# Patient Record
Sex: FEMALE | Race: WHITE | NOT HISPANIC OR LATINO | Employment: OTHER | URBAN - METROPOLITAN AREA
[De-identification: names, ages, dates, MRNs, and addresses within clinical notes are randomized per-mention and may not be internally consistent; named-entity substitution may affect disease eponyms.]

---

## 2017-03-14 ENCOUNTER — LAB (OUTPATIENT)
Dept: INTERNAL MEDICINE | Facility: CLINIC | Age: 67
End: 2017-03-14

## 2017-03-14 DIAGNOSIS — R73.01 IFG (IMPAIRED FASTING GLUCOSE): ICD-10-CM

## 2017-03-14 DIAGNOSIS — I10 ESSENTIAL HYPERTENSION: ICD-10-CM

## 2017-03-14 DIAGNOSIS — E03.8 SUBCLINICAL HYPOTHYROIDISM: ICD-10-CM

## 2017-03-14 DIAGNOSIS — E78.5 HYPERLIPIDEMIA, UNSPECIFIED HYPERLIPIDEMIA TYPE: ICD-10-CM

## 2017-03-14 LAB
ALBUMIN SERPL-MCNC: 4.1 G/DL (ref 3.5–5.2)
ALBUMIN/GLOB SERPL: 1.6 G/DL
ALP SERPL-CCNC: 85 U/L (ref 40–129)
ALT SERPL-CCNC: 17 U/L (ref 5–33)
AST SERPL-CCNC: 18 U/L (ref 5–32)
BILIRUB SERPL-MCNC: 0.8 MG/DL (ref 0.2–1.2)
BUN SERPL-MCNC: 24 MG/DL (ref 8–23)
BUN/CREAT SERPL: 26.4 (ref 7–25)
CALCIUM SERPL-MCNC: 9.2 MG/DL (ref 8.8–10.5)
CHLORIDE SERPL-SCNC: 101 MMOL/L (ref 98–107)
CHOLEST SERPL-MCNC: 140 MG/DL (ref 0–200)
CO2 SERPL-SCNC: 27.2 MMOL/L (ref 22–29)
CREAT SERPL-MCNC: 0.91 MG/DL (ref 0.57–1)
GLOBULIN SER CALC-MCNC: 2.5 GM/DL
GLUCOSE SERPL-MCNC: 129 MG/DL (ref 65–99)
HBA1C MFR BLD: 6.2 % (ref 4.8–5.6)
HDLC SERPL-MCNC: 51 MG/DL (ref 40–60)
LDLC SERPL CALC-MCNC: 64 MG/DL (ref 0–100)
POTASSIUM SERPL-SCNC: 3.9 MMOL/L (ref 3.5–5.2)
PROT SERPL-MCNC: 6.6 G/DL (ref 6–8.5)
SODIUM SERPL-SCNC: 139 MMOL/L (ref 136–145)
T4 SERPL-MCNC: 6.01 MCG/DL (ref 4.5–11.7)
TRIGL SERPL-MCNC: 123 MG/DL (ref 0–150)
TSH SERPL DL<=0.005 MIU/L-ACNC: 4.35 MIU/ML (ref 0.27–4.2)
VLDLC SERPL CALC-MCNC: 24.6 MG/DL (ref 7–27)

## 2017-03-20 ENCOUNTER — OFFICE VISIT (OUTPATIENT)
Dept: INTERNAL MEDICINE | Facility: CLINIC | Age: 67
End: 2017-03-20

## 2017-03-20 VITALS
DIASTOLIC BLOOD PRESSURE: 80 MMHG | HEIGHT: 62 IN | OXYGEN SATURATION: 98 % | WEIGHT: 175 LBS | SYSTOLIC BLOOD PRESSURE: 136 MMHG | BODY MASS INDEX: 32.2 KG/M2 | HEART RATE: 85 BPM

## 2017-03-20 DIAGNOSIS — E78.5 HYPERLIPIDEMIA, UNSPECIFIED HYPERLIPIDEMIA TYPE: ICD-10-CM

## 2017-03-20 DIAGNOSIS — Z11.59 NEED FOR HEPATITIS C SCREENING TEST: ICD-10-CM

## 2017-03-20 DIAGNOSIS — I10 ESSENTIAL HYPERTENSION: Primary | ICD-10-CM

## 2017-03-20 DIAGNOSIS — R73.01 IFG (IMPAIRED FASTING GLUCOSE): ICD-10-CM

## 2017-03-20 LAB
BILIRUB BLD-MCNC: NEGATIVE MG/DL
CLARITY, POC: CLEAR
COLOR UR: YELLOW
GLUCOSE UR STRIP-MCNC: NEGATIVE MG/DL
KETONES UR QL: NEGATIVE
LEUKOCYTE EST, POC: NEGATIVE
NITRITE UR-MCNC: NEGATIVE MG/ML
PH UR: 7 [PH] (ref 5–8)
PROT UR STRIP-MCNC: NEGATIVE MG/DL
RBC # UR STRIP: NEGATIVE /UL
SP GR UR: 1.02 (ref 1–1.03)
UROBILINOGEN UR QL: NORMAL

## 2017-03-20 PROCEDURE — 99214 OFFICE O/P EST MOD 30 MIN: CPT | Performed by: NURSE PRACTITIONER

## 2017-03-20 PROCEDURE — 81003 URINALYSIS AUTO W/O SCOPE: CPT | Performed by: NURSE PRACTITIONER

## 2017-03-20 NOTE — PROGRESS NOTES
Chief Complaint   Patient presents with   • Hyperlipidemia   • Hypertension       Subjective     Debbie Cheng is a 66 y.o. female being seen for a follow up appointment today regarding  HTN,  Hyperlipidemia, prediabetes, and hypothyroidism. She is keeping her carb count < 50 grams a meal. Weight is up 3 pounds. She is going to circuit training 4-5 days a week. She denies any symptoms of hyper or hypoglycemia. She had a UTI at [facility], and was treated.       History of Present Illness     Allergies   Allergen Reactions   • Penicillins Anaphylaxis and Swelling   • Erythromycin Other (See Comments)     JOINT PAIN AND SWELLING         Current Outpatient Prescriptions:   •  aspirin 81 MG chewable tablet, Chew 81 mg daily., Disp: , Rfl:   •  cetirizine (ZyrTEC) 10 MG tablet, Take 10 mg by mouth daily., Disp: , Rfl:   •  fluticasone (FLONASE) 50 MCG/ACT nasal spray, 2 sprays into each nostril daily. Administer 2 sprays in each nostril for each dose., Disp: , Rfl:   •  metoprolol tartrate (LOPRESSOR) 25 MG tablet, Take 1 tablet by mouth 2 (Two) Times a Day., Disp: 180 tablet, Rfl: 2  •  Misc Natural Products (OSTEO BI-FLEX ADV TRIPLE ST PO), Take  by mouth., Disp: , Rfl:   •  moxifloxacin (VIGAMOX) 0.5 % ophthalmic solution, Administer 1 drop to both eyes 3 (Three) Times a Day., Disp: 3 mL, Rfl: 0  •  Multiple Vitamins-Minerals (MULTIVITAMIN PO), Take 1 tablet by mouth., Disp: , Rfl:   •  simvastatin (ZOCOR) 40 MG tablet, Take 1 tablet by mouth Every Night., Disp: 90 tablet, Rfl: 3  •  Tavaborole (KERYDIN) 5 % solution, Apply  topically., Disp: , Rfl:   •  valsartan-hydrochlorothiazide (DIOVAN-HCT) 320-12.5 MG per tablet, Take 1 tablet by mouth daily., Disp: 90 tablet, Rfl: 3  •  vitamin D (ERGOCALCIFEROL) 09888 UNITS capsule capsule, Take 1 capsule by mouth every 7 days., Disp: 4 capsule, Rfl: 11    The following portions of the patient's history were reviewed and updated as appropriate: allergies, current medications,  past family history, past medical history, past social history, past surgical history and problem list.    Review of Systems   Constitutional: Negative.    HENT: Negative.    Eyes: Negative.    Respiratory: Negative.    Cardiovascular: Negative.  Negative for chest pain and leg swelling.   Gastrointestinal: Negative.    Endocrine: Negative.    Genitourinary: Negative.  Negative for difficulty urinating, dyspareunia and dysuria.   Musculoskeletal: Negative.    Allergic/Immunologic: Positive for environmental allergies.   Neurological: Negative.  Negative for dizziness and facial asymmetry.   Hematological: Negative.  Does not bruise/bleed easily.   Psychiatric/Behavioral: Negative.        Assessment     Physical Exam   Constitutional: She is oriented to person, place, and time. She appears well-developed and well-nourished. No distress.   HENT:   Head: Normocephalic.   Right Ear: External ear normal.   Left Ear: External ear normal.   Nose: Nose normal.   Mouth/Throat: Oropharynx is clear and moist. No oropharyngeal exudate.   Eyes: Pupils are equal, round, and reactive to light.   Neck: Neck supple. No thyromegaly present.   Cardiovascular: Normal rate, regular rhythm and normal heart sounds.    No murmur heard.  Pulmonary/Chest: Effort normal. No respiratory distress. She has no wheezes.   Musculoskeletal: She exhibits no edema or deformity.   Neurological: She is alert and oriented to person, place, and time. No cranial nerve deficit.   Skin: Skin is warm and dry. No erythema.   Psychiatric: She has a normal mood and affect. Her behavior is normal.   Vitals reviewed.      Plan     Her fasting labs were reviewed with the patient from last week.     Debbie was seen today for hyperlipidemia and hypertension.    Diagnoses and all orders for this visit:    Essential hypertension  -     Comprehensive metabolic panel; Future  -     Lipid panel; Future  -     Hemoglobin A1c; Future  -     CBC & Differential; Future  -      T4 & TSH ([facility]); Future    Hyperlipidemia, unspecified hyperlipidemia type  -     Comprehensive metabolic panel; Future  -     Lipid panel; Future  -     Hemoglobin A1c; Future  -     CBC & Differential; Future  -     T4 & TSH ([facility]); Future    IFG (impaired fasting glucose)  -     Comprehensive metabolic panel; Future  -     Lipid panel; Future  -     Hemoglobin A1c; Future  -     CBC & Differential; Future  -     T4 & TSH ([facility]); Future    Need for hepatitis C screening test  -     Hepatitis C Antibody; Future

## 2017-03-25 ENCOUNTER — RESULTS ENCOUNTER (OUTPATIENT)
Dept: INTERNAL MEDICINE | Facility: CLINIC | Age: 67
End: 2017-03-25

## 2017-03-25 DIAGNOSIS — Z11.59 NEED FOR HEPATITIS C SCREENING TEST: ICD-10-CM

## 2017-04-28 RX ORDER — ERGOCALCIFEROL 1.25 MG/1
50000 CAPSULE ORAL
Qty: 4 CAPSULE | Refills: 11 | Status: SHIPPED | OUTPATIENT
Start: 2017-04-28 | End: 2017-05-22 | Stop reason: SDUPTHER

## 2017-05-22 RX ORDER — ERGOCALCIFEROL 1.25 MG/1
50000 CAPSULE ORAL
Qty: 12 CAPSULE | Refills: 3 | Status: SHIPPED | OUTPATIENT
Start: 2017-05-22 | End: 2018-04-23 | Stop reason: SDUPTHER

## 2017-05-26 NOTE — ADDENDUM NOTE
Addended by: ISAEL JIN on: 3/20/2017 03:29 PM     Modules accepted: Orders     Returned call in rg to post-op problems, and to schedule a f/u appt with Dr. Hancock per Dr. Hancock, no ans unable to leave msg due to voicemail not setup yet.

## 2017-06-13 ENCOUNTER — OFFICE VISIT (OUTPATIENT)
Dept: CARDIOLOGY | Facility: CLINIC | Age: 67
End: 2017-06-13

## 2017-06-13 VITALS
WEIGHT: 169.5 LBS | BODY MASS INDEX: 31.19 KG/M2 | DIASTOLIC BLOOD PRESSURE: 70 MMHG | HEART RATE: 60 BPM | SYSTOLIC BLOOD PRESSURE: 118 MMHG | HEIGHT: 62 IN

## 2017-06-13 DIAGNOSIS — I48.0 PAF (PAROXYSMAL ATRIAL FIBRILLATION) (HCC): ICD-10-CM

## 2017-06-13 DIAGNOSIS — I10 ESSENTIAL HYPERTENSION: Primary | ICD-10-CM

## 2017-06-13 PROCEDURE — 93000 ELECTROCARDIOGRAM COMPLETE: CPT | Performed by: INTERNAL MEDICINE

## 2017-06-13 PROCEDURE — 99213 OFFICE O/P EST LOW 20 MIN: CPT | Performed by: INTERNAL MEDICINE

## 2017-06-13 NOTE — PROGRESS NOTES
Date of Office Visit: 2017  Encounter Provider: Keon Mann MD  Place of Service: [facility] CARDIOLOGY  Patient Name: Debbie Cheng  :1950    Chief Complaint   Patient presents with   • Atrial Fibrillation     1 YR FOLLOW UP    :     HPI: Debbie Cheng is a 66 y.o. female who presents today to followup. She is an extremely pleasant lady who had one episode of highly symptomatic atrial fibrillation in May 2015. She converted on her own. She was started on rivaroxaban and metoprolol. In 2015, I stopped the NOAC when it was clear that she had experienced no recurrence. She has some rivaroxaban in her medicine cabinet and knows to take it if her afib recurs, and then to call me. She remains active, and is limited only by joint pain. She denies any lightheadedness or orthostasis.  She has been exercising regularly and is losing weight.       Past Medical History:   Diagnosis Date   • Acute bacterial conjunctivitis of left eye 10/20/2016   • Allergic rhinitis    • Asthma    • Bronchitis    • Hyperlipidemia    • Hypertension    • PAF (paroxysmal atrial fibrillation)     one episode, 2015; was briefly treated with Xarelto   • Pneumonia    • Rotator cuff tendonitis    • Type 2 diabetes mellitus    • UTI (urinary tract infection)    • Vitamin D deficiency        Past Surgical History:   Procedure Laterality Date   •  SECTION     • CHOLECYSTECTOMY     • HYSTERECTOMY     • KNEE SURGERY         Social History     Social History   • Marital status:      Spouse name: N/A   • Number of children: N/A   • Years of education: N/A     Occupational History   • Not on file.     Social History Main Topics   • Smoking status: Never Smoker   • Smokeless tobacco: Not on file   • Alcohol use No   • Drug use: No   • Sexual activity: Not on file     Other Topics Concern   • Not on file     Social History Narrative       Family History   Problem Relation Age of Onset   •  "Stroke Mother    • Hypertension Mother    • Heart disease Father    • Hypertension Sister    • Heart disease Brother    • Cancer Paternal Grandmother      breast   • Breast cancer Paternal Grandmother    • Heart disease Paternal Grandfather    • Diabetes Paternal Grandfather        Review of Systems   Musculoskeletal: Positive for joint pain.   All other systems reviewed and are negative.      Allergies   Allergen Reactions   • Penicillins Anaphylaxis and Swelling   • Erythromycin Other (See Comments)     JOINT PAIN AND SWELLING         Current Outpatient Prescriptions:   •  aspirin 81 MG chewable tablet, Chew 81 mg daily., Disp: , Rfl:   •  cetirizine (ZyrTEC) 10 MG tablet, Take 10 mg by mouth daily., Disp: , Rfl:   •  fluticasone (FLONASE) 50 MCG/ACT nasal spray, 2 sprays into each nostril daily. Administer 2 sprays in each nostril for each dose., Disp: , Rfl:   •  metoprolol tartrate (LOPRESSOR) 25 MG tablet, Take 1 tablet by mouth 2 (Two) Times a Day., Disp: 180 tablet, Rfl: 2  •  Misc Natural Products (OSTEO BI-FLEX ADV TRIPLE ST PO), Take  by mouth., Disp: , Rfl:   •  Multiple Vitamins-Minerals (MULTIVITAMIN PO), Take 1 tablet by mouth., Disp: , Rfl:   •  simvastatin (ZOCOR) 40 MG tablet, Take 1 tablet by mouth Every Night., Disp: 90 tablet, Rfl: 3  •  Tavaborole (KERYDIN) 5 % solution, Apply  topically., Disp: , Rfl:   •  valsartan-hydrochlorothiazide (DIOVAN-HCT) 320-12.5 MG per tablet, Take 1 tablet by mouth daily., Disp: 90 tablet, Rfl: 3  •  vitamin D (ERGOCALCIFEROL) 23835 UNITS capsule capsule, Take 1 capsule by mouth Every 7 (Seven) Days., Disp: 12 capsule, Rfl: 3     Objective:     Vitals:    06/13/17 1007   BP: 118/70   Pulse: 60   Weight: 169 lb 8 oz (76.9 kg)   Height: 62\" (157.5 cm)     Body mass index is 31 kg/(m^2).    Physical Exam   Constitutional: She is oriented to person, place, and time. She appears well-developed and well-nourished.   HENT:   Head: Normocephalic.   Nose: Nose normal. "   Mouth/Throat: Oropharynx is clear and moist.   Eyes: Conjunctivae and EOM are normal. Pupils are equal, round, and reactive to light.   Neck: Normal range of motion. No JVD present.   Cardiovascular: Normal rate, regular rhythm, normal heart sounds and intact distal pulses.    No murmur heard.  Pulmonary/Chest: Effort normal and breath sounds normal.   Abdominal: Soft. She exhibits no mass. There is no tenderness.   Musculoskeletal: Normal range of motion. She exhibits no edema.   Lymphadenopathy:     She has no cervical adenopathy.   Neurological: She is alert and oriented to person, place, and time. No cranial nerve deficit.   Skin: Skin is warm and dry. No rash noted.   Psychiatric: She has a normal mood and affect. Her behavior is normal. Judgment and thought content normal.   Vitals reviewed.        ECG 12 Lead  Date/Time: 6/13/2017 10:35 AM  Performed by: KEON MANN  Authorized by: KEON MANN   Comparison: compared with previous ECG   Similar to previous ECG  Rhythm: sinus rhythm  Conduction: conduction normal  ST Segments: ST segments normal  T Waves: T waves normal  QRS axis: normal  Other: no other findings  Clinical impression: normal ECG              Assessment:       Diagnosis Plan   1. Essential hypertension     2. PAF (paroxysmal atrial fibrillation)            Plan:       1.  Her BP is very well controlled.  As she continues with weight loss, I may have to decrease her valsartan dose.  I asked her to call me if she gets symptoms of orthostasis.    2.  She had one highly symptomatic AF episode and has had no evidence of recurrence.  She will remain on metoprolol.  She has some rivaroxaban in her cabinet and will take it if her symptoms recur, then call me immediately.    Sincerely,       Keon Mann MD

## 2017-06-20 ENCOUNTER — TRANSCRIBE ORDERS (OUTPATIENT)
Dept: CARDIOLOGY | Facility: CLINIC | Age: 67
End: 2017-06-20

## 2017-06-20 DIAGNOSIS — Z13.9 SCREENING: Primary | ICD-10-CM

## 2017-07-05 ENCOUNTER — APPOINTMENT (OUTPATIENT)
Dept: CARDIOLOGY | Facility: HOSPITAL | Age: 67
End: 2017-07-05
Attending: INTERNAL MEDICINE

## 2017-07-06 ENCOUNTER — LAB (OUTPATIENT)
Dept: INTERNAL MEDICINE | Facility: CLINIC | Age: 67
End: 2017-07-06

## 2017-07-06 DIAGNOSIS — I10 ESSENTIAL HYPERTENSION: ICD-10-CM

## 2017-07-06 DIAGNOSIS — E78.5 HYPERLIPIDEMIA, UNSPECIFIED HYPERLIPIDEMIA TYPE: ICD-10-CM

## 2017-07-06 DIAGNOSIS — R73.01 IFG (IMPAIRED FASTING GLUCOSE): ICD-10-CM

## 2017-07-06 LAB
ALBUMIN SERPL-MCNC: 3.9 G/DL (ref 3.5–5.2)
ALBUMIN/GLOB SERPL: 1.7 G/DL
ALP SERPL-CCNC: 82 U/L (ref 40–129)
ALT SERPL-CCNC: 21 U/L (ref 5–33)
AST SERPL-CCNC: 23 U/L (ref 5–32)
BASOPHILS # BLD MANUAL: 0 10*3/MM3 (ref 0–0.2)
BASOPHILS NFR BLD MANUAL: 0 % (ref 0–2)
BILIRUB SERPL-MCNC: 0.5 MG/DL (ref 0.2–1.2)
BUN SERPL-MCNC: 18 MG/DL (ref 8–23)
BUN/CREAT SERPL: 19.8 (ref 7–25)
CALCIUM SERPL-MCNC: 9 MG/DL (ref 8.8–10.5)
CHLORIDE SERPL-SCNC: 102 MMOL/L (ref 98–107)
CHOLEST SERPL-MCNC: 154 MG/DL (ref 0–200)
CO2 SERPL-SCNC: 24.9 MMOL/L (ref 22–29)
CREAT SERPL-MCNC: 0.91 MG/DL (ref 0.57–1)
DIFFERENTIAL COMMENT: ABNORMAL
EOSINOPHIL # BLD MANUAL: 0.11 10*3/MM3 (ref 0.1–0.3)
EOSINOPHIL NFR BLD MANUAL: 2 % (ref 0–4)
ERYTHROCYTE [DISTWIDTH] IN BLOOD BY AUTOMATED COUNT: 14 % (ref 11.5–14.5)
GLOBULIN SER CALC-MCNC: 2.3 GM/DL
GLUCOSE SERPL-MCNC: 106 MG/DL (ref 65–99)
HBA1C MFR BLD: 5.8 % (ref 4.8–5.6)
HCT VFR BLD AUTO: 36.7 % (ref 37–47)
HDLC SERPL-MCNC: 51 MG/DL (ref 40–60)
HGB BLD-MCNC: 12.4 G/DL (ref 12–16)
LDLC SERPL CALC-MCNC: 67 MG/DL (ref 0–100)
LYMPHOCYTES # BLD MANUAL: 1.06 10*3/MM3 (ref 0.6–4.8)
LYMPHOCYTES NFR BLD MANUAL: 19 % (ref 20–45)
MCH RBC QN AUTO: 30 PG (ref 27–31)
MCHC RBC AUTO-ENTMCNC: 33.8 G/DL (ref 31–37)
MCV RBC AUTO: 88.9 FL (ref 81–99)
MONOCYTES # BLD MANUAL: 0.5 10*3/MM3 (ref 0–1)
MONOCYTES NFR BLD MANUAL: 9 % (ref 3–8)
NEUTROPHILS # BLD MANUAL: 3.24 10*3/MM3 (ref 1.5–8.3)
NEUTROPHILS NFR BLD MANUAL: 57 % (ref 45–70)
PLATELET # BLD AUTO: 231 10*3/MM3 (ref 140–500)
PLATELET BLD QL SMEAR: ABNORMAL
POTASSIUM SERPL-SCNC: 4.4 MMOL/L (ref 3.5–5.2)
PROT SERPL-MCNC: 6.2 G/DL (ref 6–8.5)
RBC # BLD AUTO: 4.13 10*6/MM3 (ref 4.2–5.4)
RBC MORPH BLD: ABNORMAL
SODIUM SERPL-SCNC: 141 MMOL/L (ref 136–145)
T4 SERPL-MCNC: 5.57 MCG/DL (ref 4.5–11.7)
TRIGL SERPL-MCNC: 182 MG/DL (ref 0–150)
TSH SERPL DL<=0.005 MIU/L-ACNC: 6.66 MIU/ML (ref 0.27–4.2)
VLDLC SERPL CALC-MCNC: 36.4 MG/DL (ref 7–27)
WBC # BLD AUTO: 5.59 10*3/MM3 (ref 4.8–10.8)

## 2017-07-10 ENCOUNTER — HOSPITAL ENCOUNTER (OUTPATIENT)
Dept: CARDIOLOGY | Facility: HOSPITAL | Age: 67
Discharge: HOME | End: 2017-07-10
Attending: INTERNAL MEDICINE | Admitting: INTERNAL MEDICINE

## 2017-07-10 VITALS
BODY MASS INDEX: 30.91 KG/M2 | HEIGHT: 62 IN | HEART RATE: 60 BPM | SYSTOLIC BLOOD PRESSURE: 116 MMHG | DIASTOLIC BLOOD PRESSURE: 60 MMHG | WEIGHT: 168 LBS

## 2017-07-10 DIAGNOSIS — Z13.9 SCREENING: ICD-10-CM

## 2017-07-10 LAB
BH CV ECHO MEAS - DIST AO DIAM: 1.57 CM
BH CV VAS BP LEFT ARM: NORMAL MMHG
BH CV VAS BP RIGHT ARM: NORMAL MMHG
BH CV XLRA MEAS - MID AO DIAM: 1.65 CM
BH CV XLRA MEAS - PAD LEFT ABI DP: 1.15
BH CV XLRA MEAS - PAD LEFT ABI PT: 1.2
BH CV XLRA MEAS - PAD LEFT ARM: 109 MMHG
BH CV XLRA MEAS - PAD LEFT LEG DP: 134 MMHG
BH CV XLRA MEAS - PAD LEFT LEG PT: 140 MMHG
BH CV XLRA MEAS - PAD RIGHT ABI DP: 1.12
BH CV XLRA MEAS - PAD RIGHT ABI PT: 1.2
BH CV XLRA MEAS - PAD RIGHT ARM: 116 MMHG
BH CV XLRA MEAS - PAD RIGHT LEG DP: 130 MMHG
BH CV XLRA MEAS - PAD RIGHT LEG PT: 140 MMHG
BH CV XLRA MEAS - PROX AO DIAM: 1.73 CM
BH CV XLRA MEAS LEFT ICA/CCA RATIO: 1.35
BH CV XLRA MEAS LEFT MID CCA PSV: NORMAL CM/SEC
BH CV XLRA MEAS LEFT MID ICA PSV: NORMAL CM/SEC
BH CV XLRA MEAS LEFT PROX ECA PSV: NORMAL CM/SEC
BH CV XLRA MEAS RIGHT ICA/CCA RATIO: 1
BH CV XLRA MEAS RIGHT MID CCA PSV: NORMAL CM/SEC
BH CV XLRA MEAS RIGHT MID ICA PSV: NORMAL CM/SEC
BH CV XLRA MEAS RIGHT PROX ECA PSV: NORMAL CM/SEC

## 2017-07-10 PROCEDURE — 93799 UNLISTED CV SVC/PROCEDURE: CPT

## 2017-07-13 ENCOUNTER — OFFICE VISIT (OUTPATIENT)
Dept: INTERNAL MEDICINE | Facility: CLINIC | Age: 67
End: 2017-07-13

## 2017-07-13 VITALS
HEIGHT: 62 IN | WEIGHT: 169 LBS | DIASTOLIC BLOOD PRESSURE: 70 MMHG | HEART RATE: 66 BPM | SYSTOLIC BLOOD PRESSURE: 122 MMHG | OXYGEN SATURATION: 97 % | BODY MASS INDEX: 31.1 KG/M2

## 2017-07-13 DIAGNOSIS — Z00.00 MEDICARE ANNUAL WELLNESS VISIT, SUBSEQUENT: Primary | ICD-10-CM

## 2017-07-13 DIAGNOSIS — I10 ESSENTIAL HYPERTENSION: ICD-10-CM

## 2017-07-13 DIAGNOSIS — I48.0 PAF (PAROXYSMAL ATRIAL FIBRILLATION) (HCC): ICD-10-CM

## 2017-07-13 DIAGNOSIS — R73.03 PRE-DIABETES: ICD-10-CM

## 2017-07-13 DIAGNOSIS — E78.5 HYPERLIPIDEMIA, UNSPECIFIED HYPERLIPIDEMIA TYPE: ICD-10-CM

## 2017-07-13 DIAGNOSIS — E55.9 VITAMIN D DEFICIENCY: ICD-10-CM

## 2017-07-13 DIAGNOSIS — Z11.59 NEED FOR HEPATITIS C SCREENING TEST: ICD-10-CM

## 2017-07-13 PROCEDURE — G0439 PPPS, SUBSEQ VISIT: HCPCS | Performed by: NURSE PRACTITIONER

## 2017-07-13 PROCEDURE — 99214 OFFICE O/P EST MOD 30 MIN: CPT | Performed by: NURSE PRACTITIONER

## 2017-07-13 NOTE — PROGRESS NOTES
QUICK REFERENCE INFORMATION:  The ABCs of the Annual Wellness Visit    She is also going to follow up on chronic diseases of HTN, Hyperlipidemia, prediabetes, and Paroxysmal A fib. She sees Dr. Mann for cardio (last appt 6-). She denies CP, SOA, edema. Or heart palpitations. She is on a low carb diet with 10,000 steps a day. Weight is down 8 pounds.     Subsequent Medicare Wellness Visit    HEALTH RISK ASSESSMENT    1950    Recent Hospitalizations:  No hospitalization(s) within the last year..        Current Medical Providers:  Patient Care Team:  LOUIS Coles as PCP - General (Family Medicine)  LOUIS Coles as PCP - Claims Attributed  Trace Onofre MD as Consulting Physician (Ophthalmology)  Keon Mann MD as Consulting Physician (Cardiology)  Parrish Oliver MD as Consulting Physician (Orthopedic Surgery)        Smoking Status:  History   Smoking Status   • Never Smoker   Smokeless Tobacco   • Not on file       Alcohol Consumption:  History   Alcohol Use No       Depression Screen:   PHQ-9 Depression Screening 7/13/2017   Little interest or pleasure in doing things 0   Feeling down, depressed, or hopeless 0   Trouble falling or staying asleep, or sleeping too much -   Feeling tired or having little energy -   Poor appetite or overeating -   Feeling bad about yourself - or that you are a failure or have let yourself or your family down -   Trouble concentrating on things, such as reading the newspaper or watching television -   Moving or speaking so slowly that other people could have noticed. Or the opposite - being so fidgety or restless that you have been moving around a lot more than usual -   Thoughts that you would be better off dead, or of hurting yourself in some way -   PHQ-9 Total Score 0       Health Habits and Functional and Cognitive Screening:  Functional & Cognitive Status 7/13/2017   Do you have difficulty preparing food and eating? No   Do you have difficulty  bathing yourself? No   Do you have difficulty getting dressed? No   Do you have difficulty using the toilet? No   Do you have difficulty moving around from place to place? No   In the past year have you fallen or experienced a near fall? No   Do you need help using the phone?  No   Are you deaf or do you have serious difficulty hearing?  No   Do you need help with transportation? No   Do you need help shopping? No   Do you need help preparing meals?  No   Do you need help with housework?  No   Do you need help with laundry? No   Do you need help taking your medications? No   Do you need help managing money? No   Do you have difficulty concentrating, remembering or making decisions? No       Health Habits  Current Diet: Low Carb Diet  Dental Exam: Up to date  Eye Exam: Up to date  Exercise (times per week): 7 times per week  Current Exercise Activities Include: Walking      Does the patient have evidence of cognitive impairment? No    Aspirin use counseling: Taking ASA appropriately as indicated      Recent Lab Results:  CMP:  Lab Results   Component Value Date     (H) 07/06/2017    BUN 18 07/06/2017    CREATININE 0.91 07/06/2017    EGFRIFNONA 62 07/06/2017    EGFRIFAFRI 75 07/06/2017    BCR 19.8 07/06/2017     07/06/2017    K 4.4 07/06/2017    CO2 24.9 07/06/2017    CALCIUM 9.0 07/06/2017    PROTENTOTREF 6.2 07/06/2017    ALBUMIN 3.90 07/06/2017    LABGLOBREF 2.3 07/06/2017    LABIL2 1.7 07/06/2017    BILITOT 0.5 07/06/2017    ALKPHOS 82 07/06/2017    AST 23 07/06/2017    ALT 21 07/06/2017     Lipid Panel:  Lab Results   Component Value Date    TRIG 182 (H) 07/06/2017    HDL 51 07/06/2017    VLDL 36.4 (H) 07/06/2017    LDLHDL 2 12/08/2015     HbA1c:  Lab Results   Component Value Date    HGBA1C 5.80 (H) 07/06/2017       Visual Acuity:  No exam data present    Age-appropriate Screening Schedule:  Refer to the list below for future screening recommendations based on patient's age, sex and/or medical  conditions. Orders for these recommended tests are listed in the plan section. The patient has been provided with a written plan.    Health Maintenance   Topic Date Due   • PNEUMOCOCCAL VACCINES (65+ LOW/MEDIUM RISK) (1 of 2 - PCV13) 10/04/2014   • DIABETIC FOOT EXAM  04/12/2016   • URINE MICROALBUMIN  04/12/2016   • COLONOSCOPY  04/12/2016   • ZOSTER VACCINE  05/31/2016   • INFLUENZA VACCINE  08/01/2017   • DIABETIC EYE EXAM  03/01/2018   • LIPID PANEL  07/06/2018   • HEMOGLOBIN A1C  07/06/2018   • MAMMOGRAM  10/10/2018   • TDAP/TD VACCINES (2 - Td) 08/26/2026        Subjective   History of Present Illness    Debbie Cheng is a 66 y.o. female who presents for an Subsequent Wellness Visit.    The following portions of the patient's history were reviewed and updated as appropriate: allergies, current medications, past family history, past medical history, past social history, past surgical history and problem list.    Outpatient Medications Prior to Visit   Medication Sig Dispense Refill   • aspirin 81 MG chewable tablet Chew 81 mg daily.     • cetirizine (ZyrTEC) 10 MG tablet Take 10 mg by mouth daily.     • fluticasone (FLONASE) 50 MCG/ACT nasal spray 2 sprays into each nostril daily. Administer 2 sprays in each nostril for each dose.     • metoprolol tartrate (LOPRESSOR) 25 MG tablet TAKE 1 TABLET BY MOUTH 2 TIMES A DAY. 180 tablet 2   • Misc Natural Products (OSTEO BI-FLEX ADV TRIPLE ST PO) Take  by mouth.     • Multiple Vitamins-Minerals (MULTIVITAMIN PO) Take 1 tablet by mouth.     • simvastatin (ZOCOR) 40 MG tablet Take 1 tablet by mouth Every Night. 90 tablet 3   • Tavaborole (KERYDIN) 5 % solution Apply  topically.     • valsartan-hydrochlorothiazide (DIOVAN-HCT) 320-12.5 MG per tablet Take 1 tablet by mouth daily. 90 tablet 3   • vitamin D (ERGOCALCIFEROL) 09634 UNITS capsule capsule Take 1 capsule by mouth Every 7 (Seven) Days. 12 capsule 3   • metoprolol tartrate (LOPRESSOR) 25 MG tablet Take 1 tablet by  mouth 2 (Two) Times a Day. 180 tablet 2     No facility-administered medications prior to visit.        Patient Active Problem List   Diagnosis   • PAF (paroxysmal atrial fibrillation)   • Hypertension   • Vitamin D deficiency   • IFG (impaired fasting glucose)   • Medicare annual wellness visit, subsequent   • Hyperlipidemia   • Pre-diabetes       Advance Care Planning:  has an advance directive - a copy HAS NOT been provided    Identification of Risk Factors:  Risk factors include: cardiovascular risk.    Review of Systems   Constitutional: Negative.    HENT: Negative.  Negative for congestion and dental problem.    Eyes: Negative.    Respiratory: Negative.    Cardiovascular: Negative.  Negative for chest pain, palpitations and leg swelling.   Gastrointestinal: Negative.    Endocrine: Negative.    Genitourinary: Negative.    Musculoskeletal: Positive for arthralgias.   Skin: Negative.    Allergic/Immunologic: Negative.    Neurological: Negative.  Negative for dizziness and facial asymmetry.   Hematological: Negative.    Psychiatric/Behavioral: Negative.        Compared to one year ago, the patient feels her physical health is the same.  Compared to one year ago, the patient feels her mental health is the same.    Objective     Physical Exam   Constitutional: She is oriented to person, place, and time. She appears well-developed and well-nourished.   HENT:   Head: Normocephalic.   Right Ear: External ear normal.   Left Ear: External ear normal.   Nose: Nose normal.   Mouth/Throat: Oropharynx is clear and moist.   Eyes: Pupils are equal, round, and reactive to light.   Neck: Neck supple. No thyromegaly present.   Cardiovascular: Normal rate, regular rhythm and normal heart sounds.    No murmur heard.  Pulmonary/Chest: Effort normal and breath sounds normal. No respiratory distress. She has no wheezes.   Abdominal: Soft. Normal appearance and bowel sounds are normal. She exhibits no distension and no mass. There is  "no tenderness. Hernia confirmed negative in the right inguinal area and confirmed negative in the left inguinal area.   Genitourinary: Vagina normal and uterus normal. Rectal exam shows guaiac negative stool. Pelvic exam was performed with patient prone. There is no rash or tenderness on the right labia. There is no rash or tenderness on the left labia. No vaginal discharge found.   Musculoskeletal: She exhibits no edema.   Lymphadenopathy:     She has no cervical adenopathy.   Neurological: She is alert and oriented to person, place, and time. No cranial nerve deficit.   Skin: Skin is warm and dry. No rash noted. No erythema.   Psychiatric: She has a normal mood and affect. Her behavior is normal.   Vitals reviewed.      Vitals:    07/13/17 1409   BP: 122/70   Pulse: 66   SpO2: 97%   Weight: 169 lb (76.7 kg)   Height: 62\" (157.5 cm)   PainSc: 0-No pain       Body mass index is 30.91 kg/(m^2).  Discussed the patient's BMI with her. The BMI is above average; BMI management plan is completed.    Assessment/Plan   Patient Self-Management and Personalized Health Advice  The patient has been provided with information about: diet, exercise, weight management and prevention of cardiac or vascular disease and preventive services including:   · Advance directive, Bone densitometry screening, Diabetes screening, see lab orders, Pneumococcal vaccine , Screening electrocardiogram, Screening mammography, referral placed, Screening Pap smear and pelvic exam .    Visit Diagnoses:  No diagnosis found.    No orders of the defined types were placed in this encounter.      Outpatient Encounter Prescriptions as of 7/13/2017   Medication Sig Dispense Refill   • aspirin 81 MG chewable tablet Chew 81 mg daily.     • cetirizine (ZyrTEC) 10 MG tablet Take 10 mg by mouth daily.     • fluticasone (FLONASE) 50 MCG/ACT nasal spray 2 sprays into each nostril daily. Administer 2 sprays in each nostril for each dose.     • metoprolol tartrate " (LOPRESSOR) 25 MG tablet TAKE 1 TABLET BY MOUTH 2 TIMES A DAY. 180 tablet 2   • Misc Natural Products (OSTEO BI-FLEX ADV TRIPLE ST PO) Take  by mouth.     • Multiple Vitamins-Minerals (MULTIVITAMIN PO) Take 1 tablet by mouth.     • simvastatin (ZOCOR) 40 MG tablet Take 1 tablet by mouth Every Night. 90 tablet 3   • Tavaborole (KERYDIN) 5 % solution Apply  topically.     • valsartan-hydrochlorothiazide (DIOVAN-HCT) 320-12.5 MG per tablet Take 1 tablet by mouth daily. 90 tablet 3   • vitamin D (ERGOCALCIFEROL) 24223 UNITS capsule capsule Take 1 capsule by mouth Every 7 (Seven) Days. 12 capsule 3   • [DISCONTINUED] metoprolol tartrate (LOPRESSOR) 25 MG tablet Take 1 tablet by mouth 2 (Two) Times a Day. 180 tablet 2     No facility-administered encounter medications on file as of 7/13/2017.        Reviewed use of high risk medication in the elderly: not applicable  Reviewed for potential of harmful drug interactions in the elderly: not applicable    Follow Up:      Diagnosis Plan   1. Medicare annual wellness visit, subsequent     2. Essential hypertension  Comprehensive metabolic panel    Lipid panel    CBC & Differential   3. Hyperlipidemia, unspecified hyperlipidemia type  Comprehensive metabolic panel    Lipid panel    CBC & Differential   4. Pre-diabetes  Comprehensive metabolic panel    Hemoglobin A1c    CBC & Differential   5. Vitamin D deficiency  Vitamin D 1,25 Dihydroxy   6. PAF (paroxysmal atrial fibrillation)     7. Need for hepatitis C screening test  Hepatitis C Antibody       An After Visit Summary and PPPS with all of these plans were given to the patient.

## 2017-07-17 LAB
CYTOLOGIST CVX/VAG CYTO: NORMAL
CYTOLOGY CVX/VAG DOC THIN PREP: NORMAL
DX ICD CODE: NORMAL
HIV 1 & 2 AB SER-IMP: NORMAL
OTHER STN SPEC: NORMAL
PATH REPORT.FINAL DX SPEC: NORMAL
STAT OF ADQ CVX/VAG CYTO-IMP: NORMAL

## 2017-07-18 ENCOUNTER — RESULTS ENCOUNTER (OUTPATIENT)
Dept: INTERNAL MEDICINE | Facility: CLINIC | Age: 67
End: 2017-07-18

## 2017-07-18 DIAGNOSIS — E78.5 HYPERLIPIDEMIA, UNSPECIFIED HYPERLIPIDEMIA TYPE: ICD-10-CM

## 2017-07-18 DIAGNOSIS — E55.9 VITAMIN D DEFICIENCY: ICD-10-CM

## 2017-07-18 DIAGNOSIS — Z11.59 NEED FOR HEPATITIS C SCREENING TEST: ICD-10-CM

## 2017-07-18 DIAGNOSIS — R73.03 PRE-DIABETES: ICD-10-CM

## 2017-07-18 DIAGNOSIS — I10 ESSENTIAL HYPERTENSION: ICD-10-CM

## 2017-07-30 DIAGNOSIS — I10 ESSENTIAL HYPERTENSION: ICD-10-CM

## 2017-07-31 RX ORDER — VALSARTAN AND HYDROCHLOROTHIAZIDE 320; 12.5 MG/1; MG/1
TABLET, FILM COATED ORAL
Qty: 90 TABLET | Refills: 2 | Status: SHIPPED | OUTPATIENT
Start: 2017-07-31 | End: 2018-04-23 | Stop reason: SDUPTHER

## 2017-09-21 ENCOUNTER — TELEPHONE (OUTPATIENT)
Dept: CARDIOLOGY | Facility: CLINIC | Age: 67
End: 2017-09-21

## 2017-09-21 NOTE — TELEPHONE ENCOUNTER
Yes, since she's not on a blood thinner it's fine . It won't increase her risk of afib.    Please let her know.    saran baumann

## 2017-09-21 NOTE — TELEPHONE ENCOUNTER
Pt called and said that she has a heel spur that has been just hurting her. She has been taking tylenol but it is not helping with the pain. She wants to know if she can take the advil? Pls advise.

## 2017-10-12 RX ORDER — SIMVASTATIN 40 MG
TABLET ORAL
Qty: 90 TABLET | Refills: 3 | Status: SHIPPED | OUTPATIENT
Start: 2017-10-12 | End: 2018-11-26 | Stop reason: SDUPTHER

## 2017-10-23 ENCOUNTER — TRANSCRIBE ORDERS (OUTPATIENT)
Dept: ADMINISTRATIVE | Facility: HOSPITAL | Age: 67
End: 2017-10-23

## 2017-10-23 DIAGNOSIS — Z12.39 BREAST SCREENING: Primary | ICD-10-CM

## 2017-10-24 ENCOUNTER — HOSPITAL ENCOUNTER (OUTPATIENT)
Dept: MAMMOGRAPHY | Facility: HOSPITAL | Age: 67
Discharge: HOME | End: 2017-10-24
Admitting: NURSE PRACTITIONER

## 2017-10-24 DIAGNOSIS — Z12.39 BREAST SCREENING: ICD-10-CM

## 2017-10-24 PROCEDURE — 77063 BREAST TOMOSYNTHESIS BI: CPT

## 2017-10-24 PROCEDURE — G0202 SCR MAMMO BI INCL CAD: HCPCS

## 2017-10-25 ENCOUNTER — TELEPHONE (OUTPATIENT)
Dept: INTERNAL MEDICINE | Facility: CLINIC | Age: 67
End: 2017-10-25

## 2017-10-25 NOTE — TELEPHONE ENCOUNTER
[facility] with details.     ----- Message from LOUIS Coles sent at 10/25/2017  7:54 AM EDT -----  Mammogram is negative.

## 2017-11-14 ENCOUNTER — OFFICE VISIT (OUTPATIENT)
Dept: INTERNAL MEDICINE | Facility: CLINIC | Age: 67
End: 2017-11-14

## 2017-11-14 VITALS
DIASTOLIC BLOOD PRESSURE: 78 MMHG | OXYGEN SATURATION: 97 % | SYSTOLIC BLOOD PRESSURE: 124 MMHG | WEIGHT: 165 LBS | HEART RATE: 82 BPM | TEMPERATURE: 98.3 F | RESPIRATION RATE: 16 BRPM | BODY MASS INDEX: 30.36 KG/M2 | HEIGHT: 62 IN

## 2017-11-14 DIAGNOSIS — J40 BRONCHITIS: ICD-10-CM

## 2017-11-14 DIAGNOSIS — Z11.59 NEED FOR HEPATITIS C SCREENING TEST: Primary | ICD-10-CM

## 2017-11-14 PROCEDURE — 99213 OFFICE O/P EST LOW 20 MIN: CPT | Performed by: NURSE PRACTITIONER

## 2017-11-14 PROCEDURE — 94664 DEMO&/EVAL PT USE INHALER: CPT | Performed by: NURSE PRACTITIONER

## 2017-11-14 RX ORDER — BUDESONIDE AND FORMOTEROL FUMARATE DIHYDRATE 160; 4.5 UG/1; UG/1
2 AEROSOL RESPIRATORY (INHALATION)
Qty: 10.2 G | Refills: 12 | Status: SHIPPED | OUTPATIENT
Start: 2017-11-14 | End: 2018-02-05

## 2017-11-14 RX ORDER — DOXYCYCLINE HYCLATE 100 MG/1
100 CAPSULE ORAL 2 TIMES DAILY
Qty: 20 CAPSULE | Refills: 0 | Status: SHIPPED | OUTPATIENT
Start: 2017-11-14 | End: 2018-02-05

## 2017-11-14 RX ORDER — MONTELUKAST SODIUM 10 MG/1
10 TABLET ORAL NIGHTLY
Qty: 30 TABLET | Refills: 0 | Status: SHIPPED | OUTPATIENT
Start: 2017-11-14 | End: 2018-02-05

## 2017-11-14 NOTE — PATIENT INSTRUCTIONS
Budesonide; Formoterol Inhalation  What is this medicine?  BUDESONIDE; FORMOTEROL (byoo TOR oh nide; for [facility] sudhir dipti) inhalation is a combination of 2 medicines that decrease inflammation and help to open up the airways in your lungs. It is used to treat asthma. It is also used to treat chronic obstructive pulmonary disease (COPD), including chronic bronchitis or emphysema. Do NOT use for an acute asthma or COPD attack.  This medicine may be used for other purposes; ask your health care provider or pharmacist if you have questions.  COMMON BRAND NAME(S): Symbicort  What should I tell my health care provider before I take this medicine?  They need to know if you have any of these conditions:  -bone problems  -diabetes  -heart disease or irregular heartbeat  -high blood pressure  -immune system problems  -infection  -liver disease  -worsening asthma  -an unusual or allergic reaction to budesonide, formoterol, medicines, foods, dyes, or preservatives  -pregnant or trying to get pregnant  -breast-feeding  How should I use this medicine?  This medicine is inhaled through the mouth. Rinse your mouth with water after use. Make sure not to swallow the water. Follow the directions on your prescription label. Do not use more often than directed. Do not stop taking except on your doctor's advice. Make sure that you are using your inhaler correctly. Ask your doctor or health care provider if you have any questions.  A special MedGuide will be given to you by the pharmacist with each prescription and refill. Be sure to read this information carefully each time.  Talk to your pediatrician regarding the use of this medicine in children. While this drug may be prescribed for children as young as 6 years of age for selected conditions, precautions do apply.  Overdosage: If you think you have taken too much of this medicine contact a poison control center or emergency room at once.  NOTE: This medicine is only for you. Do not share  this medicine with others.  What if I miss a dose?  If you miss a dose, use it as soon as you remember. If it is almost time for your next dose, use only that dose and continue with your regular schedule, spacing doses evenly. Do not use double or extra doses.  What may interact with this medicine?  Do not take this medicine with any of the following medications:  -MAOIs like Carbex, Eldepryl, Marplan, Nardil, and Parnate  -mifepristone  -probucol  -procarbazine  -some other medicines for asthma like formoterol, salmeterol  This medicine may also interact with the following medications:  -antibiotics like clarithromycin, erythromycin  -cimetidine  -diuretics  -grapefruit juice  -itraconazole  -ketoconazole  -medicines for depression, anxiety, or psychotic disturbances  -medicines for irregular heartbeat  -methadone  -some heart medicines like atenolol, metoprolol  -some other medicines for breathing problems  -some vaccines  This list may not describe all possible interactions. Give your health care provider a list of all the medicines, herbs, non-prescription drugs, or dietary supplements you use. Also tell them if you smoke, drink alcohol, or use illegal drugs. Some items may interact with your medicine.  What should I watch for while using this medicine?  Tell your doctor or health care professional if your symptoms do not improve or get worse. If you need to use your short-acting inhalers more often, call your doctor right away. Do not use more than every 12 hours.  If you have asthma, be aware that using this medicine may increase your risk of dying from asthma related problems. Talk to your doctor about the risks and benefits of taking this medicine. NEVER use this medicine for an acute asthma attack.  This medicine may increase your risk of getting an infection. Tell your doctor or health care professional if you are around anyone with measles or chickenpox, or if you develop sores or blisters that do not  heal properly.  What side effects may I notice from receiving this medicine?  Side effects that you should report to your doctor or health care professional as soon as possible:  -allergic reactions such as skin rash or itching, hives, swelling of the face, lips or tongue  -breathing problems  -changes in vision  -chest pain  -fast, irregular heartbeat  -feeling faint or lightheaded, falls  -fever  -high blood pressure  -nervousness  -tremors  -white patches or sores in mouth  Side effects that usually do not require medical attention (report to your doctor or health care professional if they continue or are bothersome):  -cough  -different taste in mouth  -headache  -sore throat  -stuffy nose  -stomach upset  This list may not describe all possible side effects. Call your doctor for medical advice about side effects. You may report side effects to FDA at 0-864-FDA-1532.  Where should I keep my medicine?  Keep out of the reach of children.  Store in a dry place at room temperature between 20 and 25 degrees C (68 and 77 degrees F). Do not get the inhaler wet. Keep track of the number of doses used. Throw away the inhaler after using the marked number of inhalations or after the expiration date, whichever comes first. Do not burn or puncture canister.  NOTE: This sheet is a summary. It may not cover all possible information. If you have questions about this medicine, talk to your doctor, pharmacist, or health care provider.     © 2017, [employer]. (2017-04-06 11:21:50)
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
## 2017-11-17 LAB
ANNOTATION COMMENT IMP: NORMAL
BASOPHILS # BLD AUTO: 0 X10E3/UL (ref 0–0.2)
BASOPHILS NFR BLD AUTO: 0 %
EOSINOPHIL # BLD AUTO: 0.3 X10E3/UL (ref 0–0.4)
EOSINOPHIL NFR BLD AUTO: 5 %
ERYTHROCYTE [DISTWIDTH] IN BLOOD BY AUTOMATED COUNT: 14.3 % (ref 12.3–15.4)
GAMMA INTERFERON BACKGROUND BLD IA-ACNC: 0.02 IU/ML
HCT VFR BLD AUTO: 37.1 % (ref 34–46.6)
HCV AB S/CO SERPL IA: <0.1 S/CO RATIO (ref 0–0.9)
HGB BLD-MCNC: 12.6 G/DL (ref 11.1–15.9)
IMM GRANULOCYTES # BLD: 0 X10E3/UL (ref 0–0.1)
IMM GRANULOCYTES NFR BLD: 0 %
LYMPHOCYTES # BLD AUTO: 1.9 X10E3/UL (ref 0.7–3.1)
LYMPHOCYTES NFR BLD AUTO: 29 %
M TB IFN-G BLD-IMP: NEGATIVE
M TB IFN-G CD4+ BCKGRND COR BLD-ACNC: 0 IU/ML
M TB IFN-G CD4+ T-CELLS BLD-ACNC: 0.02 IU/ML
MCH RBC QN AUTO: 30.3 PG (ref 26.6–33)
MCHC RBC AUTO-ENTMCNC: 34 G/DL (ref 31.5–35.7)
MCV RBC AUTO: 89 FL (ref 79–97)
MITOGEN IGNF BLD-ACNC: 9.37 IU/ML
MONOCYTES # BLD AUTO: 0.6 X10E3/UL (ref 0.1–0.9)
MONOCYTES NFR BLD AUTO: 10 %
NEUTROPHILS # BLD AUTO: 3.7 X10E3/UL (ref 1.4–7)
NEUTROPHILS NFR BLD AUTO: 56 %
PLATELET # BLD AUTO: 245 X10E3/UL (ref 150–379)
QUANTIFERON INCUBATION: NORMAL
RBC # BLD AUTO: 4.16 X10E6/UL (ref 3.77–5.28)
SERVICE CMNT-IMP: NORMAL
WBC # BLD AUTO: 6.5 X10E3/UL (ref 3.4–10.8)

## 2017-11-21 ENCOUNTER — TELEPHONE (OUTPATIENT)
Dept: INTERNAL MEDICINE | Facility: CLINIC | Age: 67
End: 2017-11-21

## 2018-01-08 ENCOUNTER — TELEPHONE (OUTPATIENT)
Dept: INTERNAL MEDICINE | Facility: CLINIC | Age: 68
End: 2018-01-08

## 2018-01-08 NOTE — TELEPHONE ENCOUNTER
Spoke with pt and expressed the need to be seen about glucose levels. Pt stated that she will reschedule for February.     ----- Message from LOUIS Coles sent at 1/8/2018  9:33 AM EST -----  Regarding: FW: appt on 1/15  I would really like to see her regarding her glucose levels, so if she would like to reschedule in 1-2 months she may (after flu season).   ----- Message -----     From: Guillermina Allen MA     Sent: 1/8/2018   9:02 AM       To: LOUIS Coles  Subject: FW: appt on 1/15                                     ----- Message -----     From: Yuridia Ambrocio     Sent: 1/8/2018   8:51 AM       To: [facility] Clinical Pool  Subject: appt on 1/15                                     shahram    Patient in today and had labs drawn for appointment on 1/15.  Patient asks if she has to come in for 1/15 appointment, or can she just be phoned with results.  She is trying to stay well and away from doctors offices if possible due to all the respiratory/flu illnesses going around.    [phone]

## 2018-01-10 LAB
1,25(OH)2D3 SERPL-MCNC: 42.4 PG/ML (ref 19.9–79.3)
ALBUMIN SERPL-MCNC: 4.2 G/DL (ref 3.5–5.2)
ALBUMIN/GLOB SERPL: 1.8 G/DL
ALP SERPL-CCNC: 81 U/L (ref 40–129)
ALT SERPL-CCNC: 18 U/L (ref 5–33)
AST SERPL-CCNC: 22 U/L (ref 5–32)
BILIRUB SERPL-MCNC: 0.7 MG/DL (ref 0.2–1.2)
BUN SERPL-MCNC: 21 MG/DL (ref 8–23)
BUN/CREAT SERPL: 22.3 (ref 7–25)
CALCIUM SERPL-MCNC: 9.1 MG/DL (ref 8.8–10.5)
CHLORIDE SERPL-SCNC: 103 MMOL/L (ref 98–107)
CHOLEST SERPL-MCNC: 155 MG/DL (ref 0–200)
CO2 SERPL-SCNC: 28.1 MMOL/L (ref 22–29)
CREAT SERPL-MCNC: 0.94 MG/DL (ref 0.57–1)
GLOBULIN SER CALC-MCNC: 2.3 GM/DL
GLUCOSE SERPL-MCNC: 108 MG/DL (ref 65–99)
HBA1C MFR BLD: 6 % (ref 4.8–5.6)
HCV AB S/CO SERPL IA: <0.1 S/CO RATIO (ref 0–0.9)
HDLC SERPL-MCNC: 55 MG/DL (ref 40–60)
LDLC SERPL CALC-MCNC: 74 MG/DL (ref 0–100)
POTASSIUM SERPL-SCNC: 4.2 MMOL/L (ref 3.5–5.2)
PROT SERPL-MCNC: 6.5 G/DL (ref 6–8.5)
SODIUM SERPL-SCNC: 141 MMOL/L (ref 136–145)
TRIGL SERPL-MCNC: 128 MG/DL (ref 0–150)
VLDLC SERPL CALC-MCNC: 25.6 MG/DL (ref 7–27)

## 2018-01-13 ENCOUNTER — RESULTS ENCOUNTER (OUTPATIENT)
Dept: INTERNAL MEDICINE | Facility: CLINIC | Age: 68
End: 2018-01-13

## 2018-01-13 DIAGNOSIS — E78.5 HYPERLIPIDEMIA, UNSPECIFIED HYPERLIPIDEMIA TYPE: ICD-10-CM

## 2018-01-13 DIAGNOSIS — R73.03 PRE-DIABETES: ICD-10-CM

## 2018-01-13 DIAGNOSIS — I10 ESSENTIAL HYPERTENSION: ICD-10-CM

## 2018-02-05 ENCOUNTER — OFFICE VISIT (OUTPATIENT)
Dept: INTERNAL MEDICINE | Facility: CLINIC | Age: 68
End: 2018-02-05

## 2018-02-05 VITALS
WEIGHT: 163 LBS | SYSTOLIC BLOOD PRESSURE: 120 MMHG | TEMPERATURE: 98.3 F | DIASTOLIC BLOOD PRESSURE: 70 MMHG | HEIGHT: 62 IN | OXYGEN SATURATION: 96 % | HEART RATE: 63 BPM | BODY MASS INDEX: 30 KG/M2

## 2018-02-05 DIAGNOSIS — E55.9 VITAMIN D DEFICIENCY: ICD-10-CM

## 2018-02-05 DIAGNOSIS — I48.0 PAF (PAROXYSMAL ATRIAL FIBRILLATION) (HCC): ICD-10-CM

## 2018-02-05 DIAGNOSIS — I10 ESSENTIAL HYPERTENSION: Primary | ICD-10-CM

## 2018-02-05 DIAGNOSIS — R73.01 IFG (IMPAIRED FASTING GLUCOSE): ICD-10-CM

## 2018-02-05 DIAGNOSIS — E78.5 HYPERLIPIDEMIA, UNSPECIFIED HYPERLIPIDEMIA TYPE: ICD-10-CM

## 2018-02-05 DIAGNOSIS — L30.9 ECZEMA, UNSPECIFIED TYPE: ICD-10-CM

## 2018-02-05 PROCEDURE — 99214 OFFICE O/P EST MOD 30 MIN: CPT | Performed by: NURSE PRACTITIONER

## 2018-02-05 RX ORDER — MONTELUKAST SODIUM 10 MG/1
10 TABLET ORAL NIGHTLY
Qty: 30 TABLET | Refills: 6 | Status: SHIPPED | OUTPATIENT
Start: 2018-02-05 | End: 2018-03-05 | Stop reason: SDUPTHER

## 2018-02-05 NOTE — PROGRESS NOTES
"Chief Complaint   Patient presents with   • Follow-up     6 Mo f/u   • Hyperlipidemia   • Hypertension   • Prediabetes       Subjective     Debbie Cheng is a 67 y.o. female being seen for a follow up appointment today regarding HTN, Paroxysmal Atrial fib, IFG, and Vitmain D Deficiency.  She is currently followed by Dr. Mann for cardio.  She has not had any atrial fib, CP, SOA. She is going to the gym 2-3 times a [place]. She has had \"intense itching\" on back and arms. She tried Tide free and baby shampoo. She tried cortisone cream. Associated red, bumps at times. Taking Zyrtec daily.       History of Present Illness     Allergies   Allergen Reactions   • Penicillins Anaphylaxis and Swelling   • Erythromycin Other (See Comments)     JOINT PAIN AND SWELLING         Current Outpatient Prescriptions:   •  aspirin 81 MG chewable tablet, Chew 81 mg daily., Disp: , Rfl:   •  cetirizine (ZyrTEC) 10 MG tablet, Take 10 mg by mouth daily., Disp: , Rfl:   •  fluticasone (FLONASE) 50 MCG/ACT nasal spray, 2 sprays into each nostril daily. Administer 2 sprays in each nostril for each dose., Disp: , Rfl:   •  metoprolol tartrate (LOPRESSOR) 25 MG tablet, TAKE 1 TABLET BY MOUTH 2 TIMES A DAY., Disp: 180 tablet, Rfl: 2  •  Misc Natural Products (OSTEO BI-FLEX ADV TRIPLE ST PO), Take  by mouth., Disp: , Rfl:   •  simvastatin (ZOCOR) 40 MG tablet, TAKE 1 TABLET BY MOUTH EVERY NIGHT., Disp: 90 tablet, Rfl: 3  •  valsartan-hydrochlorothiazide (DIOVAN-HCT) 320-12.5 MG per tablet, TAKE 1 TABLET BY MOUTH DAILY., Disp: 90 tablet, Rfl: 2  •  vitamin D (ERGOCALCIFEROL) 55328 UNITS capsule capsule, Take 1 capsule by mouth Every 7 (Seven) Days., Disp: 12 capsule, Rfl: 3    The following portions of the patient's history were reviewed and updated as appropriate: allergies, current medications, past family history, past medical history, past social history, past surgical history and problem list.    Review of Systems "   Constitutional: Negative.    HENT: Negative.    Eyes: Negative.    Respiratory: Negative.    Cardiovascular: Negative for chest pain and leg swelling.   Genitourinary: Negative.    Skin: Positive for rash.   Neurological: Negative.    Hematological: Negative.    Psychiatric/Behavioral: Negative.        Assessment     Physical Exam   Constitutional: She is oriented to person, place, and time. She appears well-developed and well-nourished.   HENT:   Head: Normocephalic.   Right Ear: External ear normal.   Left Ear: External ear normal.   Nose: Nose normal.   Mouth/Throat: Oropharynx is clear and moist.   Cardiovascular: Normal rate, regular rhythm and normal heart sounds.    No murmur heard.  Pulmonary/Chest: Effort normal and breath sounds normal. No respiratory distress. She has no wheezes.   Abdominal: Soft. Bowel sounds are normal. She exhibits no distension. There is no tenderness.   Musculoskeletal: She exhibits no edema.   Neurological: She is alert and oriented to person, place, and time.   Skin: Skin is warm and dry. Rash noted. No purpura noted. Rash is not vesicular.   Eczema to bilateral flanks   Psychiatric: She has a normal mood and affect. Her behavior is normal.   Vitals reviewed.      Plan     Her fasting labs were reviewed with the patient from last week.     Debbie was seen today for follow-up, hyperlipidemia, hypertension and prediabetes.    Diagnoses and all orders for this visit:    Essential hypertension  -     Comprehensive metabolic panel; Future  -     Lipid panel; Future  -     CBC and Differential; Future    Hyperlipidemia, unspecified hyperlipidemia type  -     Comprehensive metabolic panel; Future  -     Lipid panel; Future  -     CBC and Differential; Future    PAF (paroxysmal atrial fibrillation)  -     CBC and Differential; Future    IFG (impaired fasting glucose)  -     Comprehensive metabolic panel; Future  -     CBC and Differential; Future  -     Hemoglobin A1c; Future    Vitamin  D deficiency  -     Comprehensive metabolic panel; Future  -     CBC and Differential; Future  -     Vitamin D 1,25 Dihydroxy; Future    Eczema, unspecified type  -     montelukast (SINGULAIR) 10 MG tablet; Take 1 tablet by mouth Every Night.        Follow up in 6 months with SUNIL

## 2018-03-05 DIAGNOSIS — L30.9 ECZEMA, UNSPECIFIED TYPE: ICD-10-CM

## 2018-03-05 RX ORDER — MONTELUKAST SODIUM 10 MG/1
10 TABLET ORAL NIGHTLY
Qty: 90 TABLET | Refills: 3 | Status: SHIPPED | OUTPATIENT
Start: 2018-03-05 | End: 2020-05-26 | Stop reason: SDUPTHER

## 2018-03-08 ENCOUNTER — TELEPHONE (OUTPATIENT)
Dept: INTERNAL MEDICINE | Facility: CLINIC | Age: 68
End: 2018-03-08

## 2018-03-08 NOTE — TELEPHONE ENCOUNTER
----- Message from Ashley Eubanks MD sent at 3/8/2018  4:22 PM EST -----  Regarding: RE: UTI  Needs to go to [facility] or come tomorrow to see me or Dr. Lockhart. Sorry, no acutes left today.     ----- Message -----     From: Tabitha Ge MA     Sent: 3/8/2018   4:08 PM       To: Ashley Eubanks MD  Subject: FW: UTI                                          Help please   T[facility] already left for the day  ----- Message -----     From: Yuridia Ambrocio     Sent: 3/8/2018   3:52 PM       To: [facility] Clinical Pool  Subject: UTI                                              [facility]    Cell [phone]  Home [phone]    Symptoms - frequency in urination and pressure; slight burning    Patient would like a call back as to what to do since Terra is out on Friday      Pt aware, is going to go to urgent care [facility], has blood in urine

## 2018-04-23 DIAGNOSIS — I10 ESSENTIAL HYPERTENSION: ICD-10-CM

## 2018-04-23 RX ORDER — ERGOCALCIFEROL 1.25 MG/1
50000 CAPSULE ORAL
Qty: 12 CAPSULE | Refills: 3 | Status: SHIPPED | OUTPATIENT
Start: 2018-04-23 | End: 2019-05-08 | Stop reason: SDUPTHER

## 2018-04-23 RX ORDER — VALSARTAN AND HYDROCHLOROTHIAZIDE 320; 12.5 MG/1; MG/1
1 TABLET, FILM COATED ORAL DAILY
Qty: 90 TABLET | Refills: 2 | Status: SHIPPED | OUTPATIENT
Start: 2018-04-23 | End: 2019-01-06 | Stop reason: SDUPTHER

## 2018-05-29 ENCOUNTER — OFFICE VISIT (OUTPATIENT)
Dept: INTERNAL MEDICINE | Facility: CLINIC | Age: 68
End: 2018-05-29

## 2018-05-29 VITALS
TEMPERATURE: 98.7 F | WEIGHT: 167 LBS | BODY MASS INDEX: 30.73 KG/M2 | HEART RATE: 78 BPM | OXYGEN SATURATION: 97 % | RESPIRATION RATE: 16 BRPM | DIASTOLIC BLOOD PRESSURE: 62 MMHG | SYSTOLIC BLOOD PRESSURE: 122 MMHG | HEIGHT: 62 IN

## 2018-05-29 DIAGNOSIS — B37.31 VAGINA, CANDIDIASIS: ICD-10-CM

## 2018-05-29 DIAGNOSIS — N30.00 ACUTE CYSTITIS WITHOUT HEMATURIA: Primary | ICD-10-CM

## 2018-05-29 LAB
BILIRUB BLD-MCNC: NEGATIVE MG/DL
CLARITY, POC: CLEAR
COLOR UR: YELLOW
GLUCOSE UR STRIP-MCNC: NEGATIVE MG/DL
KETONES UR QL: NEGATIVE
LEUKOCYTE EST, POC: ABNORMAL
NITRITE UR-MCNC: NEGATIVE MG/ML
PH UR: 6 [PH] (ref 5–8)
PROT UR STRIP-MCNC: NEGATIVE MG/DL
RBC # UR STRIP: NEGATIVE /UL
SP GR UR: 1 (ref 1–1.03)
UROBILINOGEN UR QL: NORMAL

## 2018-05-29 PROCEDURE — 99213 OFFICE O/P EST LOW 20 MIN: CPT | Performed by: NURSE PRACTITIONER

## 2018-05-29 PROCEDURE — 81003 URINALYSIS AUTO W/O SCOPE: CPT | Performed by: NURSE PRACTITIONER

## 2018-05-29 RX ORDER — FLUCONAZOLE 150 MG/1
150 TABLET ORAL ONCE
Qty: 1 TABLET | Refills: 0 | Status: SHIPPED | OUTPATIENT
Start: 2018-05-29 | End: 2018-05-29

## 2018-05-29 RX ORDER — SULFAMETHOXAZOLE AND TRIMETHOPRIM 800; 160 MG/1; MG/1
1 TABLET ORAL
Qty: 14 TABLET | Refills: 0 | Status: SHIPPED | OUTPATIENT
Start: 2018-05-29 | End: 2018-06-19 | Stop reason: ALTCHOICE

## 2018-05-29 NOTE — PROGRESS NOTES
Chief Complaint   Patient presents with   • Urinary Tract Infection       Subjective   Debbie Cheng is a 67 y.o. female is being seen for an acute appointment for possible UTI. She started 4-5 days ago with burning with urination. Associated abd pressure,urgency. She increased her water intake, took cranberry juice. She also used a monistat ovule.     History of Present Illness     Current Outpatient Prescriptions on File Prior to Visit   Medication Sig Dispense Refill   • aspirin 81 MG chewable tablet Chew 81 mg daily.     • cetirizine (ZyrTEC) 10 MG tablet Take 10 mg by mouth daily.     • fluticasone (FLONASE) 50 MCG/ACT nasal spray 2 sprays into each nostril daily. Administer 2 sprays in each nostril for each dose.     • metoprolol tartrate (LOPRESSOR) 25 MG tablet Take 1 tablet by mouth 2 (Two) Times a Day. Please call office to schedule an appt 180 tablet 0   • Misc Natural Products (OSTEO BI-FLEX ADV TRIPLE ST PO) Take  by mouth.     • montelukast (SINGULAIR) 10 MG tablet Take 1 tablet by mouth Every Night. 90 tablet 3   • simvastatin (ZOCOR) 40 MG tablet TAKE 1 TABLET BY MOUTH EVERY NIGHT. 90 tablet 3   • valsartan-hydrochlorothiazide (DIOVAN-HCT) 320-12.5 MG per tablet Take 1 tablet by mouth Daily. 90 tablet 2   • vitamin D (ERGOCALCIFEROL) 16937 units capsule capsule Take 1 capsule by mouth Every 7 (Seven) Days. 12 capsule 3     No current facility-administered medications on file prior to visit.        The following portions of the patient's history were reviewed and updated as appropriate: allergies, current medications, past family history, past medical history, past social history, past surgical history and problem list.    Review of Systems   Constitutional: Negative.    HENT: Negative.  Negative for congestion and dental problem.    Eyes: Negative.    Respiratory: Negative.    Cardiovascular: Negative.  Negative for chest pain, palpitations and leg swelling.   Gastrointestinal: Negative.    Endocrine:  Negative.    Genitourinary: Positive for frequency. Negative for hematuria.   Musculoskeletal: Negative.  Negative for arthralgias and back pain.   Skin: Negative.    Allergic/Immunologic: Negative.    Neurological: Negative.    Hematological: Negative.    Psychiatric/Behavioral: Negative.        Objective   Physical Exam   Constitutional: She is oriented to person, place, and time. She appears well-developed.   Cardiovascular: Normal rate, regular rhythm and normal heart sounds.    Pulmonary/Chest: Effort normal and breath sounds normal. No respiratory distress. She has no wheezes.   Abdominal: Normal appearance and bowel sounds are normal. There is tenderness in the suprapubic area.   Musculoskeletal: She exhibits no edema.   Neurological: She is alert and oriented to person, place, and time.   Skin: Skin is warm and dry.   Psychiatric: She has a normal mood and affect. Her behavior is normal.   Vitals reviewed.      Assessment/Plan   Debbie was seen today for urinary tract infection.    Diagnoses and all orders for this visit:    Acute cystitis without hematuria  -     sulfamethoxazole-trimethoprim (BACTRIM DS) 800-160 MG per tablet; Take 1 tablet by mouth Every 14 (Fourteen) Days.    Vagina, candidiasis  -     fluconazole (DIFLUCAN) 150 MG tablet; Take 1 tablet by mouth 1 (One) Time for 1 dose.      Increase water intake. May take AZO as needed.

## 2018-05-30 ENCOUNTER — TELEPHONE (OUTPATIENT)
Dept: INTERNAL MEDICINE | Facility: CLINIC | Age: 68
End: 2018-05-30

## 2018-05-30 NOTE — TELEPHONE ENCOUNTER
Pt informed and has enough medication to take BID for 7 days.     ----- Message from LOUIS Coles sent at 2018  7:45 AM EDT -----  Regarding: FW: BACTRIM SCRIPT  Bactrim DS  Si po BID for 7 days  Disp #14    I think she has enough medication, but I think the sig was wrong. Please clarify with patient to take BID for 7 days.    ----- Message -----  From: Tabitha Ge MA  Sent: 2018   4:29 PM  To: LOUIS Coles  Subject: FW: BACTRIM SCRIPT                               PT STATED YOU TOLD HER TO TAKE   1 BID FOR  7 DAYS. WAS SENT IN INCORRECTLY.  IF THIS IS NOT RIGHT.  HAVE   KOBI CALL PT TOMORROW,   ----- Message -----  From: Yuridia Ambrocio  Sent: 2018   3:37 PM  To: Tabitha Ge MA  Subject: BACTRIM KENDRA MOORE    Patient has question about directions for Bactrim prescribed today.  Please contact her at [phone]    Script picked up and says to take once daily.  Chart says take once every fourteen days.

## 2018-05-31 LAB
BACTERIA UR CULT: ABNORMAL
BACTERIA UR CULT: ABNORMAL
OTHER ANTIBIOTIC SUSC ISLT: ABNORMAL

## 2018-06-19 ENCOUNTER — OFFICE VISIT (OUTPATIENT)
Dept: CARDIOLOGY | Facility: CLINIC | Age: 68
End: 2018-06-19

## 2018-06-19 VITALS
HEART RATE: 55 BPM | BODY MASS INDEX: 30.51 KG/M2 | SYSTOLIC BLOOD PRESSURE: 116 MMHG | HEIGHT: 62 IN | WEIGHT: 165.8 LBS | DIASTOLIC BLOOD PRESSURE: 64 MMHG

## 2018-06-19 DIAGNOSIS — I10 ESSENTIAL HYPERTENSION: ICD-10-CM

## 2018-06-19 DIAGNOSIS — I48.0 PAF (PAROXYSMAL ATRIAL FIBRILLATION) (HCC): Primary | ICD-10-CM

## 2018-06-19 PROCEDURE — 99213 OFFICE O/P EST LOW 20 MIN: CPT | Performed by: INTERNAL MEDICINE

## 2018-06-19 PROCEDURE — 93000 ELECTROCARDIOGRAM COMPLETE: CPT | Performed by: INTERNAL MEDICINE

## 2018-06-19 NOTE — PROGRESS NOTES
Date of Office Visit: 2018  Encounter Provider: Keon Mann MD  Place of Service: [facility] CARDIOLOGY  Patient Name: Debbie Cheng  :1950    Chief Complaint   Patient presents with   • Hypertension     1 yr FOLLOW UP    :     HPI: Debbie Cheng is a 67 y.o. female who presents today to followup. She is an extremely pleasant lady who had one episode of highly symptomatic atrial fibrillation in May 2015. She converted on her own. She was started on rivaroxaban and metoprolol. In 2015, I stopped the NOAC when it was clear that she had experienced no recurrence. She has some rivaroxaban in her medicine cabinet and knows to take it if her afib recurs, and then to call me. She remains active, and is limited only by joint pain. She denies any lightheadedness or orthostasis.  She has been exercising regularly.    Past Medical History:   Diagnosis Date   • Acute bacterial conjunctivitis of left eye 10/20/2016   • Allergic rhinitis    • Asthma    • Bronchitis    • Hyperlipidemia    • Hypertension    • PAF (paroxysmal atrial fibrillation)     one episode, 2015; was briefly treated with Xarelto   • Pneumonia    • Rotator cuff tendonitis    • Type 2 diabetes mellitus    • UTI (urinary tract infection)    • Vitamin D deficiency        Past Surgical History:   Procedure Laterality Date   •  SECTION     • CHOLECYSTECTOMY     • GUM SURGERY  2017   • HYSTERECTOMY     • KNEE SURGERY         Social History     Social History   • Marital status:      Spouse name: N/A   • Number of children: N/A   • Years of education: N/A     Occupational History   • Not on file.     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 She is  to Bruce.        Family History   Problem Relation Age of Onset   • Stroke Mother    • Hypertension Mother    • Heart disease Father    • Hypertension Sister    • Heart disease Brother    • Cancer Paternal Grandmother         breast   • Breast cancer Paternal Grandmother    • Heart disease Paternal Grandfather    • Diabetes Paternal Grandfather        Review of Systems   Constitution: Negative for malaise/fatigue.   Cardiovascular: Positive for palpitations. Negative for chest pain.   Respiratory: Negative for shortness of breath.    Musculoskeletal: Positive for joint pain.   Neurological: Positive for light-headedness.   All other systems reviewed and are negative.      Allergies   Allergen Reactions   • Penicillins Anaphylaxis and Swelling   • Erythromycin Other (See Comments)     JOINT PAIN AND SWELLING         Current Outpatient Prescriptions:   •  aspirin 81 MG chewable tablet, Chew 81 mg daily., Disp: , Rfl:   •  cetirizine (ZyrTEC) 10 MG tablet, Take 10 mg by mouth daily., Disp: , Rfl:   •  fluticasone (FLONASE) 50 MCG/ACT nasal spray, 2 sprays into each nostril daily. Administer 2 sprays in each nostril for each dose., Disp: , Rfl:   •  metoprolol tartrate (LOPRESSOR) 25 MG tablet, Take 1 tablet by mouth 2 (Two) Times a Day. Please call office to schedule an appt, Disp: 180 tablet, Rfl: 0  •  Misc Natural Products (OSTEO BI-FLEX ADV TRIPLE ST PO), Take  by mouth., Disp: , Rfl:   •  montelukast (SINGULAIR) 10 MG tablet, Take 1 tablet by mouth Every Night., Disp: 90 tablet, Rfl: 3  •  simvastatin (ZOCOR) 40 MG tablet, TAKE 1 TABLET BY MOUTH EVERY NIGHT., Disp: 90 tablet, Rfl: 3  •  valsartan-hydrochlorothiazide (DIOVAN-HCT) 320-12.5 MG per tablet, Take 1 tablet by mouth Daily., Disp: 90 tablet, Rfl: 2  •  vitamin D (ERGOCALCIFEROL) 67017 units capsule capsule, Take 1 capsule by mouth Every 7 (Seven) Days., Disp: 12 capsule, Rfl:  "3     Objective:     Vitals:    06/19/18 0935   BP: 116/64   BP Location: Right arm   Pulse: 55   Weight: 75.2 kg (165 lb 12.8 oz)   Height: 157.5 cm (62\")     Body mass index is 30.33 kg/m².    Physical Exam   Constitutional: She is oriented to person, place, and time. She appears well-developed and well-nourished.   HENT:   Head: Normocephalic.   Nose: Nose normal.   Mouth/Throat: Oropharynx is clear and moist.   Eyes: Conjunctivae and EOM are normal. Pupils are equal, round, and reactive to light.   Neck: Normal range of motion. No JVD present.   Cardiovascular: Normal rate, regular rhythm, normal heart sounds and intact distal pulses.    No murmur heard.  Pulmonary/Chest: Effort normal and breath sounds normal.   Abdominal: Soft. She exhibits no mass. There is no tenderness.   Musculoskeletal: Normal range of motion. She exhibits no edema.   Lymphadenopathy:     She has no cervical adenopathy.   Neurological: She is alert and oriented to person, place, and time. No cranial nerve deficit.   Skin: Skin is warm and dry. No rash noted.   Psychiatric: She has a normal mood and affect. Her behavior is normal. Judgment and thought content normal.   Vitals reviewed.        ECG 12 Lead  Date/Time: 6/19/2018 9:39 AM  Performed by: DREA KELLY  Authorized by: DREA KELLY   Comparison: compared with previous ECG   Similar to previous ECG  Rhythm: sinus rhythm  Conduction: conduction normal  ST Segments: ST segments normal  T Waves: T waves normal  QRS axis: normal  Other: no other findings  Clinical impression: normal ECG              Assessment:       Diagnosis Plan   1. PAF (paroxysmal atrial fibrillation)     2. Essential hypertension            Plan:       1.  She had one highly symptomatic AF episode and has had no evidence of recurrence.  She will remain on metoprolol.  She has some rivaroxaban in her cabinet and will take it if her symptoms recur, then call me immediately.    2.  Her BP is very well controlled.  "     Sincerely,       Keon Mann MD

## 2018-08-21 ENCOUNTER — LAB (OUTPATIENT)
Dept: INTERNAL MEDICINE | Facility: CLINIC | Age: 68
End: 2018-08-21

## 2018-08-21 DIAGNOSIS — I48.0 PAF (PAROXYSMAL ATRIAL FIBRILLATION) (HCC): ICD-10-CM

## 2018-08-21 DIAGNOSIS — R73.01 IFG (IMPAIRED FASTING GLUCOSE): ICD-10-CM

## 2018-08-21 DIAGNOSIS — E55.9 VITAMIN D DEFICIENCY: ICD-10-CM

## 2018-08-21 DIAGNOSIS — E78.5 HYPERLIPIDEMIA, UNSPECIFIED HYPERLIPIDEMIA TYPE: ICD-10-CM

## 2018-08-21 DIAGNOSIS — I10 ESSENTIAL HYPERTENSION: ICD-10-CM

## 2018-08-22 LAB
1,25(OH)2D3 SERPL-MCNC: 37.3 PG/ML (ref 19.9–79.3)
ALBUMIN SERPL-MCNC: 4 G/DL (ref 3.5–5.2)
ALBUMIN/GLOB SERPL: 1.8 G/DL
ALP SERPL-CCNC: 76 U/L (ref 40–129)
ALT SERPL-CCNC: 18 U/L (ref 5–33)
AST SERPL-CCNC: 21 U/L (ref 5–32)
BASOPHILS # BLD AUTO: 0.01 10*3/MM3 (ref 0–0.2)
BASOPHILS NFR BLD AUTO: 0.2 % (ref 0–2)
BILIRUB SERPL-MCNC: 0.6 MG/DL (ref 0.2–1.2)
BUN SERPL-MCNC: 23 MG/DL (ref 8–23)
BUN/CREAT SERPL: 24.2 (ref 7–25)
CALCIUM SERPL-MCNC: 9.1 MG/DL (ref 8.8–10.5)
CHLORIDE SERPL-SCNC: 104 MMOL/L (ref 98–107)
CHOLEST SERPL-MCNC: 141 MG/DL (ref 0–200)
CO2 SERPL-SCNC: 24.1 MMOL/L (ref 22–29)
CREAT SERPL-MCNC: 0.95 MG/DL (ref 0.57–1)
EOSINOPHIL # BLD AUTO: 0.11 10*3/MM3 (ref 0.1–0.3)
EOSINOPHIL NFR BLD AUTO: 2.6 % (ref 0–4)
ERYTHROCYTE [DISTWIDTH] IN BLOOD BY AUTOMATED COUNT: 14.1 % (ref 11.5–14.5)
GLOBULIN SER CALC-MCNC: 2.2 GM/DL
GLUCOSE SERPL-MCNC: 108 MG/DL (ref 65–99)
HBA1C MFR BLD: 6 % (ref 4.8–5.6)
HCT VFR BLD AUTO: 35.3 % (ref 37–47)
HDLC SERPL-MCNC: 54 MG/DL (ref 40–60)
HGB BLD-MCNC: 11.7 G/DL (ref 12–16)
IMM GRANULOCYTES # BLD: 0.01 10*3/MM3 (ref 0–0.03)
IMM GRANULOCYTES NFR BLD: 0.2 % (ref 0–0.5)
LDLC SERPL CALC-MCNC: 62 MG/DL (ref 0–100)
LYMPHOCYTES # BLD AUTO: 1.14 10*3/MM3 (ref 0.6–4.8)
LYMPHOCYTES NFR BLD AUTO: 27.3 % (ref 20–45)
MCH RBC QN AUTO: 29.9 PG (ref 27–31)
MCHC RBC AUTO-ENTMCNC: 33.1 G/DL (ref 31–37)
MCV RBC AUTO: 90.3 FL (ref 81–99)
MONOCYTES # BLD AUTO: 0.64 10*3/MM3 (ref 0–1)
MONOCYTES NFR BLD AUTO: 15.3 % (ref 3–8)
NEUTROPHILS # BLD AUTO: 2.27 10*3/MM3 (ref 1.5–8.3)
NEUTROPHILS NFR BLD AUTO: 54.4 % (ref 45–70)
NRBC BLD AUTO-RTO: 0 /100 WBC (ref 0–0)
PLATELET # BLD AUTO: 207 10*3/MM3 (ref 140–500)
POTASSIUM SERPL-SCNC: 4 MMOL/L (ref 3.5–5.2)
PROT SERPL-MCNC: 6.2 G/DL (ref 6–8.5)
RBC # BLD AUTO: 3.91 10*6/MM3 (ref 4.2–5.4)
SODIUM SERPL-SCNC: 142 MMOL/L (ref 136–145)
TRIGL SERPL-MCNC: 127 MG/DL (ref 0–150)
VLDLC SERPL CALC-MCNC: 25.4 MG/DL (ref 7–27)
WBC # BLD AUTO: 4.18 10*3/MM3 (ref 4.8–10.8)

## 2018-08-23 DIAGNOSIS — D64.9 ANEMIA, UNSPECIFIED TYPE: Primary | ICD-10-CM

## 2018-08-28 ENCOUNTER — OFFICE VISIT (OUTPATIENT)
Dept: INTERNAL MEDICINE | Facility: CLINIC | Age: 68
End: 2018-08-28

## 2018-08-28 VITALS
SYSTOLIC BLOOD PRESSURE: 122 MMHG | OXYGEN SATURATION: 98 % | HEART RATE: 60 BPM | RESPIRATION RATE: 16 BRPM | BODY MASS INDEX: 30.55 KG/M2 | TEMPERATURE: 98.5 F | DIASTOLIC BLOOD PRESSURE: 72 MMHG | HEIGHT: 62 IN | WEIGHT: 166 LBS

## 2018-08-28 DIAGNOSIS — Z00.00 MEDICARE ANNUAL WELLNESS VISIT, SUBSEQUENT: Primary | ICD-10-CM

## 2018-08-28 DIAGNOSIS — I48.0 PAF (PAROXYSMAL ATRIAL FIBRILLATION) (HCC): ICD-10-CM

## 2018-08-28 DIAGNOSIS — B35.1 ONYCHOMYCOSIS: ICD-10-CM

## 2018-08-28 DIAGNOSIS — I10 ESSENTIAL HYPERTENSION: ICD-10-CM

## 2018-08-28 DIAGNOSIS — Z78.0 POSTMENOPAUSAL: ICD-10-CM

## 2018-08-28 DIAGNOSIS — D64.9 ANEMIA OF UNKNOWN ETIOLOGY: ICD-10-CM

## 2018-08-28 DIAGNOSIS — E78.2 MIXED HYPERLIPIDEMIA: ICD-10-CM

## 2018-08-28 DIAGNOSIS — R73.01 IFG (IMPAIRED FASTING GLUCOSE): ICD-10-CM

## 2018-08-28 DIAGNOSIS — E55.9 VITAMIN D DEFICIENCY: ICD-10-CM

## 2018-08-28 LAB
BASOPHILS # BLD AUTO: 0.02 10*3/MM3 (ref 0–0.2)
BASOPHILS NFR BLD AUTO: 0.5 % (ref 0–2)
BILIRUB BLD-MCNC: NEGATIVE MG/DL
CLARITY, POC: CLEAR
COLOR UR: YELLOW
EOSINOPHIL # BLD AUTO: 0.12 10*3/MM3 (ref 0.1–0.3)
EOSINOPHIL NFR BLD AUTO: 2.8 % (ref 0–4)
ERYTHROCYTE [DISTWIDTH] IN BLOOD BY AUTOMATED COUNT: 14.1 % (ref 11.5–14.5)
GLUCOSE UR STRIP-MCNC: NEGATIVE MG/DL
HCT VFR BLD AUTO: 37.4 % (ref 37–47)
HGB BLD-MCNC: 12.5 G/DL (ref 12–16)
IMM GRANULOCYTES # BLD: 0.01 10*3/MM3 (ref 0–0.03)
IMM GRANULOCYTES NFR BLD: 0.2 % (ref 0–0.5)
IRON SATN MFR SERPL: 20 %
IRON SERPL-MCNC: 78 MCG/DL (ref 37–145)
KETONES UR QL: NEGATIVE
LEUKOCYTE EST, POC: NEGATIVE
LYMPHOCYTES # BLD AUTO: 1.23 10*3/MM3 (ref 0.6–4.8)
LYMPHOCYTES NFR BLD AUTO: 28.3 % (ref 20–45)
MCH RBC QN AUTO: 30 PG (ref 27–31)
MCHC RBC AUTO-ENTMCNC: 33.4 G/DL (ref 31–37)
MCV RBC AUTO: 89.9 FL (ref 81–99)
MONOCYTES # BLD AUTO: 0.78 10*3/MM3 (ref 0–1)
MONOCYTES NFR BLD AUTO: 18 % (ref 3–8)
NEUTROPHILS # BLD AUTO: 2.18 10*3/MM3 (ref 1.5–8.3)
NEUTROPHILS NFR BLD AUTO: 50.2 % (ref 45–70)
NITRITE UR-MCNC: NEGATIVE MG/ML
NRBC BLD AUTO-RTO: 0 /100 WBC (ref 0–0)
PH UR: 5 [PH] (ref 5–8)
PLATELET # BLD AUTO: 217 10*3/MM3 (ref 140–500)
POC CREATININE URINE: NORMAL
POC MICROALBUMIN URINE: NORMAL
PROT UR STRIP-MCNC: NEGATIVE MG/DL
RBC # BLD AUTO: 4.16 10*6/MM3 (ref 4.2–5.4)
RBC # UR STRIP: NEGATIVE /UL
SP GR UR: 1.02 (ref 1–1.03)
TIBC SERPL-MCNC: 388 MCG/DL (ref 261–478)
UIBC SERPL-MCNC: 310 MCG/DL (ref 112–346)
UROBILINOGEN UR QL: NORMAL
VIT B12 SERPL-MCNC: 675 PG/ML
WBC # BLD AUTO: 4.34 10*3/MM3 (ref 4.8–10.8)

## 2018-08-28 PROCEDURE — 99214 OFFICE O/P EST MOD 30 MIN: CPT | Performed by: NURSE PRACTITIONER

## 2018-08-28 PROCEDURE — 81003 URINALYSIS AUTO W/O SCOPE: CPT | Performed by: NURSE PRACTITIONER

## 2018-08-28 PROCEDURE — 82044 UR ALBUMIN SEMIQUANTITATIVE: CPT | Performed by: NURSE PRACTITIONER

## 2018-08-28 PROCEDURE — G0439 PPPS, SUBSEQ VISIT: HCPCS | Performed by: NURSE PRACTITIONER

## 2018-08-28 NOTE — PROGRESS NOTES
"QUICK REFERENCE INFORMATION:  The ABCs of Providing the Annual Wellness Visit   CMS.gov [employer] Medicare Subsequent Wellness Visit      Subjective   History of Present Illness    Debbie Cheng is a 67 y.o. female who presents for an Subsequent Wellness Visit. In addition, we addressed the following health issues:    HTN, Hyperlipidemia, IFG, PAF, VItamin D Def    She is currently on Diovan /12.5mg daily for BP control. She takes Lopressor 25mg daily for rate control and BP regulation. Her BP at home is normal. She is also followed by Dr. Mann for cardio. She denies any Afib in the past 6 months that she is aware of. She has rare palpitations, but no CP, SA, edema.     She takes Zocor for cholesterol lowering. She is tolerating it without myalgias.    Her weight has been stable. She does watch her carb intake, but reports \"i have fizzled lately.\"     She takes a weekly Vitamin D for Vitamin D def disease.         PMH, PSH, SocHx, FamHx, Allergies, and Medications: Reviewed and updated in the Visit Navigator.     Outpatient Medications Prior to Visit   Medication Sig Dispense Refill   • aspirin 81 MG chewable tablet Chew 81 mg daily.     • cetirizine (ZyrTEC) 10 MG tablet Take 10 mg by mouth daily.     • fluticasone (FLONASE) 50 MCG/ACT nasal spray 2 sprays into each nostril daily. Administer 2 sprays in each nostril for each dose.     • metoprolol tartrate (LOPRESSOR) 25 MG tablet Take 1 tablet by mouth 2 (Two) Times a Day. 180 tablet 3   • Misc Natural Products (OSTEO BI-FLEX ADV TRIPLE ST PO) Take  by mouth.     • montelukast (SINGULAIR) 10 MG tablet Take 1 tablet by mouth Every Night. 90 tablet 3   • simvastatin (ZOCOR) 40 MG tablet TAKE 1 TABLET BY MOUTH EVERY NIGHT. 90 tablet 3   • valsartan-hydrochlorothiazide (DIOVAN-HCT) 320-12.5 MG per tablet Take 1 tablet by mouth Daily. 90 tablet 2   • vitamin D (ERGOCALCIFEROL) 38678 units capsule capsule Take 1 capsule by mouth Every 7 (Seven) Days. 12 " capsule 3     No facility-administered medications prior to visit.        Patient Active Problem List   Diagnosis   • PAF (paroxysmal atrial fibrillation) (CMS/[facility])   • Hypertension   • Vitamin D deficiency   • IFG (impaired fasting glucose)   • Medicare annual wellness visit, subsequent   • Hyperlipidemia   • Eczema       Health Habits:  Dental Exam. up to date  Eye Exam. up to date  Exercise: 5 times/week.  Current exercise activities include: aerobics and walking    Social:  See review in SnapShot activity and in SocHx section of Visit Navigator.    Health Risk Assessment:  The patient has completed a Health Risk Assessment. This has been reviewed with them and has been scanned into Media Manager as a separate document.    Current Medical Providers:  Patient Care Team:  Terra Reddy APRN as PCP - General (Family Medicine)  Terra Reddy APRN as PCP - Claims Attributed  Trace Onofre MD as Consulting Physician (Ophthalmology)  Keon Mann MD as Consulting Physician (Cardiology)  Parrish Oliver MD as Consulting Physician (Orthopedic Surgery)    The [facility] providers who are involved in the care of this patient are listed above. Additional providers and suppliers are listed below:  Dr. Fabio Mann    Recent Hospitalizations:  No hospitalization(s) within the last year..    Age-appropriate Screening Schedule:  Refer to the list below for future screening recommendations based on patient's age. Orders for these recommended tests are listed in the plan section. The patient has been provided with a written plan.    Health Maintenance   Topic Date Due   • ZOSTER VACCINE (2 of 2) 02/26/2011   • COLONOSCOPY  04/12/2016   • DIABETIC EYE EXAM  03/01/2018   • MEDICARE ANNUAL WELLNESS  07/13/2018   • DIABETIC FOOT EXAM  07/13/2018   • URINE MICROALBUMIN  07/13/2018   • INFLUENZA VACCINE  08/01/2018   • HEMOGLOBIN A1C  02/21/2019   • LIPID PANEL  08/21/2019   • MAMMOGRAM   10/24/2019   • TDAP/TD VACCINES (2 - Td) 08/26/2026   • HEPATITIS C SCREENING  Completed   • PNEUMOCOCCAL VACCINES (65+ LOW/MEDIUM RISK)  Completed       Depression Screen:   PHQ-2/PHQ-9 Depression Screening 8/28/2018   Little interest or pleasure in doing things 0   Feeling down, depressed, or hopeless 0   Trouble falling or staying asleep, or sleeping too much 1   Feeling tired or having little energy 1   Poor appetite or overeating 0   Feeling bad about yourself - or that you are a failure or have let yourself or your family down 0   Trouble concentrating on things, such as reading the newspaper or watching television 0   Moving or speaking so slowly that other people could have noticed. Or the opposite - being so fidgety or restless that you have been moving around a lot more than usual 0   Thoughts that you would be better off dead, or of hurting yourself in some way 0   Total Score 2   If you checked off any problems, how difficult have these problems made it for you to do your work, take care of things at home, or get along with other people? Not difficult at all       Functional and Cognitive Screening:  Functional & Cognitive Status 8/28/2018   Do you have difficulty preparing food and eating? No   Do you have difficulty bathing yourself, getting dressed or grooming yourself? No   Do you have difficulty using the toilet? No   Do you have difficulty moving around from place to place? No   Do you have trouble with steps or getting out of a bed or a chair? No   In the past year have you fallen or experienced a near fall? No   Current Diet Well Balanced Diet   Dental Exam Up to date   Eye Exam Up to date   Exercise (times per week) 4 times per week   Current Exercise Activities Include Walking   Do you need help using the phone?  No   Are you deaf or do you have serious difficulty hearing?  No   Do you need help with transportation? No   Do you need help shopping? No   Do you need help preparing meals?  No   Do  "you need help with housework?  No   Do you need help with laundry? No   Do you need help taking your medications? No   Do you need help managing money? No   Do you ever drive or ride in a car without wearing a seat belt? No   Have you felt unusual stress, anger or loneliness in the last month? No   Who do you live with? Spouse   If you need help, do you have trouble finding someone available to you? No   Have you been bothered in the last four weeks by sexual problems? No   Do you have difficulty concentrating, remembering or making decisions? No       Does the patient have evidence of cognitive impairment? No    Identification of Risk Factors:  Risk factors include: cardiovascular risk.    Review of Systems   Constitutional: Negative.    HENT: Negative.    Eyes: Negative.    Respiratory: Negative.    Cardiovascular: Negative.  Negative for chest pain, palpitations and leg swelling.   Gastrointestinal: Negative.    Endocrine: Negative.    Genitourinary: Negative.    Musculoskeletal: Positive for arthralgias and back pain.   Skin: Negative.    Allergic/Immunologic: Negative.    Neurological: Negative.    Hematological: Negative.    Psychiatric/Behavioral: Negative.      /72   Pulse 60   Temp 98.5 °F (36.9 °C) (Oral)   Resp 16   Ht 157.5 cm (62\")   Wt 75.3 kg (166 lb)   SpO2 98%   BMI 30.36 kg/m²     General Appearance:    Alert, cooperative, no distress, appears stated age   Head:    Normocephalic, without obvious abnormality, atraumatic   Eyes:    PERRL, conjunctiva/corneas clear, EOM's intact, fundi     benign, both eyes   Ears:    Normal TM's and external ear canals, both ears   Nose:   Nares normal, septum midline, mucosa normal, no drainage     or sinus tenderness   Throat:   Lips, mucosa, and tongue normal; teeth and gums normal   Neck:   Supple, symmetrical, trachea midline, no adenopathy;     thyroid:  no enlargement/tenderness/nodules; no carotid    bruit or JVD   Back:     Symmetric, no " "curvature, ROM normal, no CVA tenderness   Lungs:     Clear to auscultation bilaterally, respirations unlabored   Chest Wall:    No tenderness or deformity    Heart:    Regular rate and rhythm, S1 and S2 normal, no murmur, rub    or gallop   Breast Exam:    No tenderness, masses, or nipple abnormality   Abdomen:     Soft, non-tender, bowel sounds active all four quadrants,     no masses, no organomegaly   Genitalia:    Normal female without lesion, discharge or tenderness   Rectal:    Normal tone, no masses or tenderness; guaiac negative stool   Extremities:   Extremities normal, atraumatic, no cyanosis or edema   Pulses:   2+ and symmetric all extremities   Skin:   Skin color, texture, turgor normal, no rashes or lesions   Lymph nodes:   Cervical, supraclavicular, and axillary nodes normal   Neurologic:   CNII-XII intact, normal strength, sensation and reflexes     throughout       Diabetic foot exam:   Left: Filament test present   Pulses Dorsalis Pedis:  present  Posterior Tibial:  present   Reflexes 2+    Vibratory sensation normal   Proprioception normal   Sharp/dull discrimination normal       Right: Filament test present   Pulses Dorsalis Pedis:  present  Posterior Tibial:  present   Reflexes 2+    Vibratory sensation normal   Proprioception normal   Sharp/dull discrimination normal    Objective     Vitals:    08/28/18 0804   BP: 122/72   Pulse: 60   Resp: 16   Temp: 98.5 °F (36.9 °C)   TempSrc: Oral   SpO2: 98%   Weight: 75.3 kg (166 lb)   Height: 157.5 cm (62\")       Body mass index is 30.36 kg/m².    Assessment/Plan   Patient Self-Management and Personalized Health Advice  The patient has been provided with information about: diet, exercise, weight management and prevention of cardiac or vascular disease and preventive services including:   · Advance directive, Bone densitometry screening, Colorectal cancer screening, fecal occult blood test, Pneumococcal vaccine , Screening electrocardiogram, Screening " mammography, referral placed, Zostavax vaccine (Herpes Zoster).    Discussed the patient's BMI with her. The BMI is in the acceptable range.    Orders:     Diagnosis Plan   1. Medicare annual wellness visit, subsequent     2. Essential hypertension  Comprehensive metabolic panel    Lipid panel   3. Mixed hyperlipidemia  Comprehensive metabolic panel    Lipid panel   4. PAF (paroxysmal atrial fibrillation) (CMS/[facility])     5. IFG (impaired fasting glucose)  Comprehensive metabolic panel    Hemoglobin A1c   6. Vitamin D deficiency  DEXA Bone Density Axial    Vitamin D 1,25 Dihydroxy   7. Anemia of unknown etiology  Occult Blood, Fecal By Immunoassay - Stool, Per Rectum    CBC & Differential    Iron Profile    Vitamin B12   8. Postmenopausal  DEXA Bone Density Axial   9. Onychomycosis  ciclopirox (PENLAC) 8 % solution       Follow Up:    6 months with Fasting labs    She had just donated blood 3 weeks ago, and a slight anemia showed on labs. Will check levels again today.      An After Visit Summary and PPPS with all of these plans were given to the patient.

## 2018-08-28 NOTE — PATIENT INSTRUCTIONS
It is time for your DXA (bone density testing) with your next Mammogram due 10-.  You are due for your [id number] and high dose flu vaccine 10-1-2018.  Your second Hep A vaccine is due 11-1-2018.      Bone Densitometry  Bone densitometry is an imaging test that uses a special X-ray to measure the amount of calcium and other minerals in your bones (bone density). This test is also known as a bone mineral density test or dual-energy X-ray absorptiometry (DXA). The test can measure bone density at your hip and your spine. It is similar to having a regular X-ray.  You may have this test to:  · Diagnose a condition that causes weak or thin bones (osteoporosis).  · Predict your risk of a broken bone (fracture).  · Determine how well osteoporosis treatment is working.    Tell a health care provider about:  · Any allergies you have.  · All medicines you are taking, including vitamins, herbs, eye drops, creams, and over-the-counter medicines.  · Any problems you or family members have had with anesthetic medicines.  · Any blood disorders you have.  · Any surgeries you have had.  · Any medical conditions you have.  · Possibility of pregnancy.  · Any other medical test you had within the previous 14 days that used contrast material.  What are the risks?  Generally, this is a safe procedure. However, problems can occur and may include the following:  · This test exposes you to a very small amount of radiation.  · The risks of radiation exposure may be greater to unborn children.    What happens before the procedure?  · Do not take any calcium supplements for 24 hours before having the test. You can otherwise eat and drink what you usually do.  · Take off all metal jewelry, eyeglasses, dental appliances, and any other metal objects.  What happens during the procedure?  · You may lie on an exam table. There will be an X-ray generator below you and an imaging device above you.  · Other devices, such as boxes or braces, may  be used to position your body properly for the scan.  · You will need to lie still while the machine slowly scans your body.  · The images will show up on a computer monitor.  What happens after the procedure?  You may need more testing at a later time.  This information is not intended to replace advice given to you by your health care provider. Make sure you discuss any questions you have with your health care provider.  Document Released: 01/09/2006 Document Revised: 05/25/2017 Document Reviewed: 05/28/2015  [employer] Interactive Patient Education © 2018 [employer].  Pneumococcal Vaccine, Polyvalent solution for injection  What is this medicine?  PNEUMOCOCCAL VACCINE, POLYVALENT (YEHUDA mo SKY al vak SEEN, cyril ee VEY luhnt) is a vaccine to prevent pneumococcus bacteria infection. These bacteria are a major cause of ear infections, Strep throat infections, and serious pneumonia, meningitis, or blood infections worldwide. These vaccines help the body to produce antibodies (protective substances) that help your body defend against these bacteria. This vaccine is recommended for people 2 years of age and older with health problems. It is also recommended for all adults over 50 years old. This vaccine will not treat an infection.  This medicine may be used for other purposes; ask your health care provider or pharmacist if you have questions.  COMMON BRAND NAME(S): Pneumovax 23  What should I tell my health care provider before I take this medicine?  They need to know if you have any of these conditions:  -bleeding problems  -bone marrow or organ transplant  -cancer, Hodgkin's disease  -fever  -infection  -immune system problems  -low platelet count in the blood  -seizures  -an unusual or allergic reaction to pneumococcal vaccine, diphtheria toxoid, other vaccines, latex, other medicines, foods, dyes, or preservatives  -pregnant or trying to get pregnant  -breast-feeding  How should I use this medicine?  This vaccine is  for injection into a muscle or under the skin. It is given by a health care professional.  A copy of Vaccine Information Statements will be given before each vaccination. Read this sheet carefully each time. The sheet may change frequently.  Talk to your pediatrician regarding the use of this medicine in children. While this drug may be prescribed for children as young as 2 years of age for selected conditions, precautions do apply.  Overdosage: If you think you have taken too much of this medicine contact a poison control center or emergency room at once.  NOTE: This medicine is only for you. Do not share this medicine with others.  What if I miss a dose?  It is important not to miss your dose. Call your doctor or health care professional if you are unable to keep an appointment.  What may interact with this medicine?  -medicines for cancer chemotherapy  -medicines that suppress your immune function  -medicines that treat or prevent blood clots like warfarin, enoxaparin, and dalteparin  -steroid medicines like prednisone or cortisone  This list may not describe all possible interactions. Give your health care provider a list of all the medicines, herbs, non-prescription drugs, or dietary supplements you use. Also tell them if you smoke, drink alcohol, or use illegal drugs. Some items may interact with your medicine.  What should I watch for while using this medicine?  Mild fever and pain should go away in 3 days or less. Report any unusual symptoms to your doctor or health care professional.  What side effects may I notice from receiving this medicine?  Side effects that you should report to your doctor or health care professional as soon as possible:  -allergic reactions like skin rash, itching or hives, swelling of the face, lips, or tongue  -breathing problems  -confused  -fever over 102 degrees F  -pain, tingling, numbness in the hands or feet  -seizures  -unusual bleeding or bruising  -unusual muscle  weakness  Side effects that usually do not require medical attention (report to your doctor or health care professional if they continue or are bothersome):  -aches and pains  -diarrhea  -fever of 102 degrees F or less  -headache  -irritable  -loss of appetite  -pain, tender at site where injected  -trouble sleeping  This list may not describe all possible side effects. Call your doctor for medical advice about side effects. You may report side effects to FDA at 8-644-LJZ-6857.  Where should I keep my medicine?  This does not apply. This vaccine is given in a clinic, pharmacy, doctor's office, or other health care setting and will not be stored at home.  NOTE: This sheet is a summary. It may not cover all possible information. If you have questions about this medicine, talk to your doctor, pharmacist, or health care provider.  © 2018 Elsevier/Gold Standard (2009-07-24 14:32:37)

## 2018-08-30 ENCOUNTER — TRANSCRIBE ORDERS (OUTPATIENT)
Dept: INTERNAL MEDICINE | Facility: CLINIC | Age: 68
End: 2018-08-30

## 2018-08-30 DIAGNOSIS — Z12.39 SCREENING BREAST EXAMINATION: Primary | ICD-10-CM

## 2018-09-06 LAB — HEMOCCULT STL QL IA: NEGATIVE

## 2018-10-25 ENCOUNTER — HOSPITAL ENCOUNTER (OUTPATIENT)
Dept: MAMMOGRAPHY | Facility: HOSPITAL | Age: 68
Discharge: HOME | End: 2018-10-25
Admitting: NURSE PRACTITIONER

## 2018-10-25 ENCOUNTER — APPOINTMENT (OUTPATIENT)
Dept: BONE DENSITY | Facility: HOSPITAL | Age: 68
End: 2018-10-25

## 2018-10-25 DIAGNOSIS — Z12.39 SCREENING BREAST EXAMINATION: ICD-10-CM

## 2018-10-25 DIAGNOSIS — Z78.0 POSTMENOPAUSAL: ICD-10-CM

## 2018-10-25 DIAGNOSIS — E55.9 VITAMIN D DEFICIENCY: ICD-10-CM

## 2018-10-25 PROCEDURE — 77063 BREAST TOMOSYNTHESIS BI: CPT

## 2018-10-25 PROCEDURE — 77080 DXA BONE DENSITY AXIAL: CPT

## 2018-10-25 PROCEDURE — 77067 SCR MAMMO BI INCL CAD: CPT

## 2018-11-27 RX ORDER — SIMVASTATIN 40 MG
TABLET ORAL
Qty: 90 TABLET | Refills: 3 | Status: SHIPPED | OUTPATIENT
Start: 2018-11-27 | End: 2019-11-07 | Stop reason: SDUPTHER

## 2018-12-12 ENCOUNTER — TELEPHONE (OUTPATIENT)
Dept: INTERNAL MEDICINE | Facility: CLINIC | Age: 68
End: 2018-12-12

## 2018-12-12 NOTE — TELEPHONE ENCOUNTER
Called patient and she will bring in first thing in the morning          ----- Message from LOUIS Coles sent at 2018 12:08 PM EST -----  Regarding: RE: UTI  Contact: [phone]  She can drop off a urine for culture, please call patient  ----- Message -----  From: Cathleen Brooks MA  Sent: 2018  11:01 AM  To: LOUIS Coles  Subject: FW: UTI                                          Please let Guillermina know your decision. Thanks   ----- Message -----  From: Toshia Venegas  Sent: 2018   8:54 AM  [facility] [place]  Subject: UTI                                              :  [birth year]  ESTEBAN PT.    PATIENT FEELS SHE HAS AN UTI AND WANTS TO KNOW IF SHE COULD COME AND LEAVE A SPECIMEN. ESTEBAN IS OVER 100% TODAY. PLEASE ADVISE.

## 2018-12-13 ENCOUNTER — CLINICAL SUPPORT (OUTPATIENT)
Dept: INTERNAL MEDICINE | Facility: CLINIC | Age: 68
End: 2018-12-13

## 2018-12-13 DIAGNOSIS — R82.998 LEUKOCYTES IN URINE: ICD-10-CM

## 2018-12-13 DIAGNOSIS — R30.0 DYSURIA: Primary | ICD-10-CM

## 2018-12-13 LAB
BILIRUB BLD-MCNC: NEGATIVE MG/DL
CLARITY, POC: ABNORMAL
COLOR UR: YELLOW
GLUCOSE UR STRIP-MCNC: NEGATIVE MG/DL
KETONES UR QL: NEGATIVE
LEUKOCYTE EST, POC: ABNORMAL
NITRITE UR-MCNC: NEGATIVE MG/ML
PH UR: 5 [PH] (ref 5–8)
PROT UR STRIP-MCNC: NEGATIVE MG/DL
RBC # UR STRIP: ABNORMAL /UL
SP GR UR: 1.02 (ref 1–1.03)
UROBILINOGEN UR QL: NORMAL

## 2018-12-13 PROCEDURE — 81003 URINALYSIS AUTO W/O SCOPE: CPT | Performed by: NURSE PRACTITIONER

## 2018-12-14 ENCOUNTER — TELEPHONE (OUTPATIENT)
Dept: INTERNAL MEDICINE | Facility: CLINIC | Age: 68
End: 2018-12-14

## 2018-12-14 RX ORDER — NITROFURANTOIN 25; 75 MG/1; MG/1
100 CAPSULE ORAL 2 TIMES DAILY
Qty: 14 CAPSULE | Refills: 0 | Status: SHIPPED | OUTPATIENT
Start: 2018-12-14 | End: 2019-05-08

## 2018-12-14 NOTE — TELEPHONE ENCOUNTER
Prelim cx not avail; Dr. Lockhart not avail when patient called back; per Dr. Velázquez I sent in Macrobid 100 mg bid x 7 days.  Explained to patient that this is a broad spectrum antibiotic that may or may not be theraputic, since we do not have the culture results to determine sensitivity.  Hopefully, this will get her started with improvement over weekend until we get results back.  Patient voiced understanding.      ----- Message from Sarah Beth Lockhart MD sent at 12/14/2018  9:17 AM EST -----  Can call to get prelim on culture    ----- Message -----  From: Guillermina Allen MA  Sent: 12/14/2018   8:47 AM  To: MD Josiah Greenbergy pt with UTI symptoms.  Office dip yesterday revealed small blood and leukocytes.  CX pending.  Patient uncomfortable going into the weekend without tx.  Broad spectrum in meantime?  ALLERGIES:  E-MYCIN AND PCN.

## 2018-12-16 LAB
BACTERIA UR CULT: ABNORMAL
BACTERIA UR CULT: ABNORMAL
OTHER ANTIBIOTIC SUSC ISLT: ABNORMAL

## 2018-12-18 ENCOUNTER — OFFICE VISIT (OUTPATIENT)
Dept: INTERNAL MEDICINE | Facility: CLINIC | Age: 68
End: 2018-12-18

## 2018-12-18 VITALS
BODY MASS INDEX: 30.73 KG/M2 | RESPIRATION RATE: 16 BRPM | DIASTOLIC BLOOD PRESSURE: 80 MMHG | TEMPERATURE: 98.3 F | OXYGEN SATURATION: 94 % | WEIGHT: 167 LBS | HEART RATE: 94 BPM | HEIGHT: 62 IN | SYSTOLIC BLOOD PRESSURE: 148 MMHG

## 2018-12-18 DIAGNOSIS — J20.8 VIRAL BRONCHITIS: Primary | ICD-10-CM

## 2018-12-18 DIAGNOSIS — J02.9 SORE THROAT: ICD-10-CM

## 2018-12-18 LAB
EXPIRATION DATE: NORMAL
INTERNAL CONTROL: NORMAL
Lab: NORMAL
S PYO AG THROAT QL: NEGATIVE

## 2018-12-18 PROCEDURE — 87880 STREP A ASSAY W/OPTIC: CPT | Performed by: NURSE PRACTITIONER

## 2018-12-18 PROCEDURE — 99213 OFFICE O/P EST LOW 20 MIN: CPT | Performed by: NURSE PRACTITIONER

## 2018-12-18 NOTE — PROGRESS NOTES
"Chief Complaint   Patient presents with   • Sore Throat     exposed to strep   • Cough     non prod       Subjective     Debbie Cheng is a 68 y.o. female being seen for a follow up appointment today regarding cough and congestion. She has been taking care of her sick grandchildren who have RSV. She started with sore throat, like \"scalding.\" She was awake \"half the night with cough.\" She is on Zyrtec, Flonase,  and Singular. Denies SOA, wheezing, CP.       History of Present Illness     Allergies   Allergen Reactions   • Penicillins Anaphylaxis and Swelling   • Erythromycin Other (See Comments)     JOINT PAIN AND SWELLING         Current Outpatient Medications:   •  aspirin 81 MG chewable tablet, Chew 81 mg daily., Disp: , Rfl:   •  cetirizine (ZyrTEC) 10 MG tablet, Take 10 mg by mouth daily., Disp: , Rfl:   •  fluticasone (FLONASE) 50 MCG/ACT nasal spray, 2 sprays into each nostril daily. Administer 2 sprays in each nostril for each dose., Disp: , Rfl:   •  metoprolol tartrate (LOPRESSOR) 25 MG tablet, Take 1 tablet by mouth 2 (Two) Times a Day., Disp: 180 tablet, Rfl: 3  •  Misc Natural Products (OSTEO BI-FLEX ADV TRIPLE ST PO), Take  by mouth., Disp: , Rfl:   •  montelukast (SINGULAIR) 10 MG tablet, Take 1 tablet by mouth Every Night., Disp: 90 tablet, Rfl: 3  •  nitrofurantoin, macrocrystal-monohydrate, (MACROBID) 100 MG capsule, Take 1 capsule by mouth 2 (Two) Times a Day., Disp: 14 capsule, Rfl: 0  •  simvastatin (ZOCOR) 40 MG tablet, TAKE 1 TABLET BY MOUTH EVERY NIGHT., Disp: 90 tablet, Rfl: 3  •  valsartan-hydrochlorothiazide (DIOVAN-HCT) 320-12.5 MG per tablet, Take 1 tablet by mouth Daily., Disp: 90 tablet, Rfl: 2  •  vitamin D (ERGOCALCIFEROL) 12687 units capsule capsule, Take 1 capsule by mouth Every 7 (Seven) Days., Disp: 12 capsule, Rfl: 3  •  ciclopirox (PENLAC) 8 % solution, Apply  topically to the appropriate area as directed Every Night., Disp: 6 mL, Rfl: 0    The following portions of the patient's " history were reviewed and updated as appropriate: allergies, current medications, past family history, past medical history, past social history, past surgical history and problem list.    Review of Systems   Constitutional: Positive for chills and fatigue. Negative for fever.   HENT: Positive for congestion, postnasal drip, rhinorrhea, sinus pressure, sinus pain, sneezing and sore throat.    Eyes: Negative.    Respiratory: Positive for cough, chest tightness and shortness of breath. Negative for choking, wheezing and stridor.    Cardiovascular: Negative.  Negative for chest pain, palpitations and leg swelling.   Gastrointestinal: Negative.    Endocrine: Negative.    Genitourinary: Negative.    Musculoskeletal: Negative.    Skin: Negative.    Allergic/Immunologic: Negative.    Neurological: Negative.    Hematological: Negative.    Psychiatric/Behavioral: Negative.        Assessment     Physical Exam   Constitutional: She is oriented to person, place, and time. She appears well-developed and well-nourished.   HENT:   Head: Normocephalic.   Right Ear: External ear normal.   Left Ear: External ear normal.   Nose: Mucosal edema present. Right sinus exhibits no maxillary sinus tenderness and no frontal sinus tenderness. Left sinus exhibits no maxillary sinus tenderness and no frontal sinus tenderness.   Mouth/Throat: Oropharynx is clear and moist. No oropharyngeal exudate.   Neck: Neck supple. No thyromegaly present.   Cardiovascular: Normal rate, regular rhythm and normal heart sounds.   No murmur heard.  Pulmonary/Chest: Effort normal and breath sounds normal. No stridor (cough). No respiratory distress.   Musculoskeletal: She exhibits no edema.   Neurological: She is alert and oriented to person, place, and time.   Psychiatric: She has a normal mood and affect. Her behavior is normal.   Vitals reviewed.      Ana Lewis was seen today for sore throat and cough.    Diagnoses and all orders for this  visit:    Sore throat  -     POCT rapid strep A    Other orders  -     mometasone-formoterol (DULERA) 100-5 MCG/ACT inhaler; Inhale 2 puffs 2 (Two) Times a Day.       Diagnosis Plan   1. Viral bronchitis  mometasone-formoterol (DULERA) 100-5 MCG/ACT inhaler   2. Sore throat  POCT rapid strep A     Treat with supportive measures.   Follow up as needed

## 2019-01-06 DIAGNOSIS — I10 ESSENTIAL HYPERTENSION: ICD-10-CM

## 2019-01-07 RX ORDER — VALSARTAN AND HYDROCHLOROTHIAZIDE 320; 12.5 MG/1; MG/1
1 TABLET, FILM COATED ORAL DAILY
Qty: 90 TABLET | Refills: 2 | Status: SHIPPED | OUTPATIENT
Start: 2019-01-07 | End: 2019-09-29 | Stop reason: SDUPTHER

## 2019-05-01 ENCOUNTER — LAB (OUTPATIENT)
Dept: INTERNAL MEDICINE | Facility: CLINIC | Age: 69
End: 2019-05-01

## 2019-05-01 DIAGNOSIS — E55.9 VITAMIN D DEFICIENCY: ICD-10-CM

## 2019-05-01 DIAGNOSIS — I10 ESSENTIAL HYPERTENSION: ICD-10-CM

## 2019-05-01 DIAGNOSIS — R73.01 IFG (IMPAIRED FASTING GLUCOSE): ICD-10-CM

## 2019-05-01 DIAGNOSIS — E78.2 MIXED HYPERLIPIDEMIA: ICD-10-CM

## 2019-05-03 LAB
1,25(OH)2D3 SERPL-MCNC: 40.6 PG/ML (ref 19.9–79.3)
ALBUMIN SERPL-MCNC: 4.3 G/DL (ref 3.5–5.2)
ALBUMIN/GLOB SERPL: 1.8 G/DL
ALP SERPL-CCNC: 68 U/L (ref 39–117)
ALT SERPL-CCNC: 20 U/L (ref 1–33)
AST SERPL-CCNC: 22 U/L (ref 1–32)
BILIRUB SERPL-MCNC: 0.6 MG/DL (ref 0.2–1.2)
BUN SERPL-MCNC: 25 MG/DL (ref 8–23)
BUN/CREAT SERPL: 27.8 (ref 7–25)
CALCIUM SERPL-MCNC: 9.2 MG/DL (ref 8.6–10.5)
CHLORIDE SERPL-SCNC: 101 MMOL/L (ref 98–107)
CHOLEST SERPL-MCNC: 140 MG/DL (ref 0–200)
CO2 SERPL-SCNC: 23 MMOL/L (ref 22–29)
CREAT SERPL-MCNC: 0.9 MG/DL (ref 0.57–1)
GLOBULIN SER CALC-MCNC: 2.4 GM/DL
GLUCOSE SERPL-MCNC: 112 MG/DL (ref 65–99)
HBA1C MFR BLD: 6.1 % (ref 4.8–5.6)
HDLC SERPL-MCNC: 50 MG/DL (ref 40–60)
LDLC SERPL CALC-MCNC: 66 MG/DL (ref 0–100)
POTASSIUM SERPL-SCNC: 4.1 MMOL/L (ref 3.5–5.2)
PROT SERPL-MCNC: 6.7 G/DL (ref 6–8.5)
SODIUM SERPL-SCNC: 139 MMOL/L (ref 136–145)
TRIGL SERPL-MCNC: 118 MG/DL (ref 0–150)
VLDLC SERPL CALC-MCNC: 23.6 MG/DL

## 2019-05-08 ENCOUNTER — OFFICE VISIT (OUTPATIENT)
Dept: INTERNAL MEDICINE | Facility: CLINIC | Age: 69
End: 2019-05-08

## 2019-05-08 VITALS
HEART RATE: 53 BPM | BODY MASS INDEX: 30.73 KG/M2 | SYSTOLIC BLOOD PRESSURE: 116 MMHG | WEIGHT: 167 LBS | DIASTOLIC BLOOD PRESSURE: 62 MMHG | OXYGEN SATURATION: 97 % | HEIGHT: 62 IN | TEMPERATURE: 98.9 F | RESPIRATION RATE: 16 BRPM

## 2019-05-08 DIAGNOSIS — E55.9 VITAMIN D DEFICIENCY: ICD-10-CM

## 2019-05-08 DIAGNOSIS — I48.0 PAF (PAROXYSMAL ATRIAL FIBRILLATION) (HCC): ICD-10-CM

## 2019-05-08 DIAGNOSIS — I83.813 VARICOSE VEINS OF BOTH LOWER EXTREMITIES WITH PAIN: ICD-10-CM

## 2019-05-08 DIAGNOSIS — R73.01 IFG (IMPAIRED FASTING GLUCOSE): ICD-10-CM

## 2019-05-08 DIAGNOSIS — Z80.51 FAMILY HISTORY OF RENAL CANCER: ICD-10-CM

## 2019-05-08 DIAGNOSIS — E78.2 MIXED HYPERLIPIDEMIA: ICD-10-CM

## 2019-05-08 DIAGNOSIS — I10 ESSENTIAL HYPERTENSION: Primary | ICD-10-CM

## 2019-05-08 PROBLEM — J02.9 SORE THROAT: Status: RESOLVED | Noted: 2018-12-18 | Resolved: 2019-05-08

## 2019-05-08 PROBLEM — J20.8 VIRAL BRONCHITIS: Status: RESOLVED | Noted: 2018-12-18 | Resolved: 2019-05-08

## 2019-05-08 PROCEDURE — 99214 OFFICE O/P EST MOD 30 MIN: CPT | Performed by: NURSE PRACTITIONER

## 2019-05-08 RX ORDER — ERGOCALCIFEROL 1.25 MG/1
50000 CAPSULE ORAL
Qty: 12 CAPSULE | Refills: 3 | Status: SHIPPED | OUTPATIENT
Start: 2019-05-08 | End: 2020-03-30

## 2019-05-08 NOTE — PROGRESS NOTES
Chief Complaint   Patient presents with   • Follow-up   • Hypertension   • Hyperlipidemia   • Blood Sugar Problem   • Med Refill     Vit D refill        Subjective     Debbie Cheng is a 68 y.o. female being seen for a follow up appointment today regarding HTN, Hyperlipidemia, Vitamin D Def, IFG and PAF. She is currently on Zocor 40mg nightly for cholesterol control. She is on Diovan /12.5mg daily for HTN and Lopresor 25mg BID for rate control. She is followed by Dr. Mann. She denies any atrial fib episodes. She checks her pulse if she feels any palpitations, which she rarely reports.    She is on Vitamin D def for defiency. She is tolerating it well.     She is waking 10,000 steps daily or going to the gym. She is reporting pain behind both knees at the end of the day. Relieves after sitting for a few minutes.     Her sister had stage 3 kidney cancer. She was not symptomatic.       History of Present Illness     Allergies   Allergen Reactions   • Penicillins Anaphylaxis and Swelling   • Erythromycin Other (See Comments)     JOINT PAIN AND SWELLING         Current Outpatient Medications:   •  aspirin 81 MG chewable tablet, Chew 81 mg daily., Disp: , Rfl:   •  cetirizine (ZyrTEC) 10 MG tablet, Take 10 mg by mouth daily., Disp: , Rfl:   •  fluticasone (FLONASE) 50 MCG/ACT nasal spray, 2 sprays into each nostril daily. Administer 2 sprays in each nostril for each dose., Disp: , Rfl:   •  metoprolol tartrate (LOPRESSOR) 25 MG tablet, Take 1 tablet by mouth 2 (Two) Times a Day., Disp: 180 tablet, Rfl: 2  •  Misc Natural Products (OSTEO BI-FLEX ADV TRIPLE ST PO), Take  by mouth., Disp: , Rfl:   •  montelukast (SINGULAIR) 10 MG tablet, Take 1 tablet by mouth Every Night., Disp: 90 tablet, Rfl: 3  •  simvastatin (ZOCOR) 40 MG tablet, TAKE 1 TABLET BY MOUTH EVERY NIGHT., Disp: 90 tablet, Rfl: 3  •  valsartan-hydrochlorothiazide (DIOVAN-HCT) 320-12.5 MG per tablet, TAKE 1 TABLET BY MOUTH DAILY., Disp: 90 tablet, Rfl:  2  •  vitamin D (ERGOCALCIFEROL) 36567 units capsule capsule, Take 1 capsule by mouth Every 7 (Seven) Days., Disp: 12 capsule, Rfl: 3    The following portions of the patient's history were reviewed and updated as appropriate: allergies, current medications, past family history, past medical history, past social history, past surgical history and problem list.    Review of Systems   Constitutional: Negative.    HENT: Negative.    Eyes: Negative.    Respiratory: Negative for shortness of breath, wheezing and stridor.    Cardiovascular: Positive for palpitations. Negative for chest pain.   Endocrine: Negative.    Genitourinary: Negative.    Musculoskeletal: Negative.    Neurological: Negative.    Hematological: Negative.    Psychiatric/Behavioral: Negative.        Assessment     Physical Exam   Constitutional: She is oriented to person, place, and time. She appears well-developed and well-nourished. No distress.   HENT:   Head: Normocephalic.   Right Ear: External ear normal.   Left Ear: External ear normal.   Nose: Nose normal.   Mouth/Throat: Oropharynx is clear and moist.   Cardiovascular: Normal rate, regular rhythm and normal heart sounds.   No murmur heard.  Pulmonary/Chest: Effort normal and breath sounds normal. No stridor. No respiratory distress.   Musculoskeletal: She exhibits no edema.   Neurological: She is alert and oriented to person, place, and time.   Skin: No abrasion and no rash noted. She is not diaphoretic.   Varicose veins to BLE with pain and inflammation. No edema or redness.   Vitals reviewed.      Plan     Her fasting labs were reviewed with the patient from last week.     Debbie was seen today for follow-up, hypertension, hyperlipidemia, blood sugar problem and med refill.    Diagnoses and all orders for this visit:    Essential hypertension  -     US renal bilateral; Future  -     Comprehensive metabolic panel; Future  -     Lipid panel; Future  -     CBC & Differential; Future    Mixed  hyperlipidemia  -     Comprehensive metabolic panel; Future  -     Lipid panel; Future    IFG (impaired fasting glucose)  -     Comprehensive metabolic panel; Future  -     Hemoglobin A1c; Future    Vitamin D deficiency  -     vitamin D (ERGOCALCIFEROL) 02490 units capsule capsule; Take 1 capsule by mouth Every 7 (Seven) Days.    PAF (paroxysmal atrial fibrillation) (CMS/HCC)  -     CBC & Differential; Future    Varicose veins of both lower extremities with pain    Family history of renal cancer  -     US renal bilateral; Future        Discussed vascular referral for varicose veins, will hold at this time.     Follow up in 6 moth with SUNIL

## 2019-05-13 ENCOUNTER — RESULTS ENCOUNTER (OUTPATIENT)
Dept: INTERNAL MEDICINE | Facility: CLINIC | Age: 69
End: 2019-05-13

## 2019-05-13 DIAGNOSIS — R73.01 IFG (IMPAIRED FASTING GLUCOSE): ICD-10-CM

## 2019-05-13 DIAGNOSIS — I10 ESSENTIAL HYPERTENSION: ICD-10-CM

## 2019-05-13 DIAGNOSIS — E78.2 MIXED HYPERLIPIDEMIA: ICD-10-CM

## 2019-05-14 ENCOUNTER — HOSPITAL ENCOUNTER (OUTPATIENT)
Dept: ULTRASOUND IMAGING | Facility: HOSPITAL | Age: 69
Discharge: HOME | End: 2019-05-14
Admitting: NURSE PRACTITIONER

## 2019-05-14 DIAGNOSIS — I10 ESSENTIAL HYPERTENSION: ICD-10-CM

## 2019-05-14 DIAGNOSIS — Z80.51 FAMILY HISTORY OF RENAL CANCER: ICD-10-CM

## 2019-05-14 PROCEDURE — 76775 US EXAM ABDO BACK WALL LIM: CPT

## 2019-06-25 ENCOUNTER — OFFICE VISIT (OUTPATIENT)
Dept: CARDIOLOGY | Facility: CLINIC | Age: 69
End: 2019-06-25

## 2019-06-25 VITALS
HEIGHT: 62 IN | SYSTOLIC BLOOD PRESSURE: 132 MMHG | BODY MASS INDEX: 31.32 KG/M2 | DIASTOLIC BLOOD PRESSURE: 84 MMHG | WEIGHT: 170.2 LBS | HEART RATE: 52 BPM

## 2019-06-25 DIAGNOSIS — I48.0 PAF (PAROXYSMAL ATRIAL FIBRILLATION) (HCC): Primary | ICD-10-CM

## 2019-06-25 DIAGNOSIS — I10 ESSENTIAL HYPERTENSION: ICD-10-CM

## 2019-06-25 PROCEDURE — 99213 OFFICE O/P EST LOW 20 MIN: CPT | Performed by: INTERNAL MEDICINE

## 2019-06-25 PROCEDURE — 93000 ELECTROCARDIOGRAM COMPLETE: CPT | Performed by: INTERNAL MEDICINE

## 2019-06-25 RX ORDER — ALBUTEROL SULFATE 90 UG/1
AEROSOL, METERED RESPIRATORY (INHALATION)
Refills: 3 | COMMUNITY
Start: 2019-06-15

## 2019-06-25 NOTE — PROGRESS NOTES
Date of Office Visit: 2019  Encounter Provider: Keon Mann MD  Place of Service: [facility]  Patient Name: Debbie Cheng  :1950    Chief Complaint   Patient presents with   • Atrial Fibrillation   :     HPI: Debbie Cheng is a 68 y.o. female who presents today to followup. She is an extremely pleasant lady who had one episode of highly symptomatic atrial fibrillation in May 2015. She converted on her own. She was started on rivaroxaban and metoprolol. In 2015, I stopped the NOAC when it was clear that she had experienced no recurrence. She has some rivaroxaban in her medicine cabinet and knows to take it if her afib recurs, and then to call me.     She has tried to remain active, and is limited only by joint pain. She had been exercising regularly.  However, she has not been going as frequently due to other obligations.  She now notes generalized fatigue.  Sometimes she gets lightheaded when she turns her head.    Past Medical History:   Diagnosis Date   • Acute bacterial conjunctivitis of left eye 10/20/2016   • Allergic rhinitis    • Asthma    • Bronchitis    • Hyperlipidemia    • Hypertension    • PAF (paroxysmal atrial fibrillation) (CMS/HCC)     one episode, 2015; was briefly treated with Xarelto   • Pneumonia    • Rotator cuff tendonitis    • Type 2 diabetes mellitus (CMS/HCC)    • UTI (urinary tract infection)    • Vitamin D deficiency        Past Surgical History:   Procedure Laterality Date   •  SECTION     • CHOLECYSTECTOMY     • GUM SURGERY  2017   • HYSTERECTOMY     • KNEE SURGERY         Social History     Socioeconomic History   • Marital status:      Spouse name: Not on file   • Number of children: Not on file   • Years of education: Not on file   • Highest education level: Not on file   Tobacco Use   • Smoking status: Never Smoker   • Smokeless tobacco: Never Used   • Tobacco comment: CAFFEINE USE : MINIMAL, ONLY  DRINKS COFFEE IN THE WINTER, ONE CUP   Substance and Sexual Activity   • Alcohol use: No     Drinks per session: 1 or 2     Binge frequency: Weekly   • Drug use: No   Social History Narrative    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 She is  to Bruce. She works out at a [employer] in [place]. She volunteers at the [employer] on Wednesdays. She watches her 3 grandchildren.        Family History   Problem Relation Age of Onset   • Stroke Mother    • Hypertension Mother    • Heart disease Father    • Hypertension Sister    • Heart disease Brother    • Cancer Paternal Grandmother         breast   • Breast cancer Paternal Grandmother    • Heart disease Paternal Grandfather    • Diabetes Paternal Grandfather        Review of Systems   Constitution: Positive for malaise/fatigue.   Cardiovascular: Positive for palpitations. Negative for chest pain.   Respiratory: Negative for shortness of breath.    Musculoskeletal: Positive for joint pain.   Neurological: Positive for light-headedness.   All other systems reviewed and are negative.      Allergies   Allergen Reactions   • Penicillins Anaphylaxis and Swelling   • Erythromycin Other (See Comments)     JOINT PAIN AND SWELLING         Current Outpatient Medications:   •  aspirin 81 MG chewable tablet, Chew 81 mg daily., Disp: , Rfl:   •  cetirizine (ZyrTEC) 10 MG tablet, Take 10 mg by mouth daily., Disp: , Rfl:   •  fluticasone (FLONASE) 50 MCG/ACT nasal spray, 2 sprays into each nostril daily. Administer 2 sprays in each nostril for each dose., Disp: , Rfl:   •  metoprolol tartrate (LOPRESSOR) 25 MG tablet, Take 1 tablet by mouth 2 (Two) Times a Day., Disp: 180 tablet, Rfl: 2  •  Misc Natural Products (OSTEO BI-FLEX ADV TRIPLE ST PO), Take  by mouth., Disp: , Rfl:   •  montelukast (SINGULAIR) 10 MG tablet, Take 1 tablet by mouth Every Night., Disp: 90 tablet, Rfl: 3  •  simvastatin (ZOCOR) 40 MG tablet, TAKE 1 TABLET BY MOUTH EVERY NIGHT., Disp: 90 tablet, Rfl:  "3  •  valsartan-hydrochlorothiazide (DIOVAN-HCT) 320-12.5 MG per tablet, TAKE 1 TABLET BY MOUTH DAILY., Disp: 90 tablet, Rfl: 2  •  vitamin D (ERGOCALCIFEROL) 40529 units capsule capsule, Take 1 capsule by mouth Every 7 (Seven) Days., Disp: 12 capsule, Rfl: 3  •  VENTOLIN  (90 Base) MCG/ACT inhaler, USE 2 PUFFS EVERY 4-6 HOURS AS NEEDED FOR COUGHING ,WHEEZING OR SHORTNESS OF BREATH., Disp: , Rfl: 3     Objective:     Vitals:    06/25/19 0900   BP: 132/84   BP Location: Left arm   Pulse: 52   Weight: 77.2 kg (170 lb 3.2 oz)   Height: 157.5 cm (62\")     Body mass index is 31.13 kg/m².    Physical Exam   Constitutional: She is oriented to person, place, and time. She appears well-developed and well-nourished.   HENT:   Head: Normocephalic.   Nose: Nose normal.   Mouth/Throat: Oropharynx is clear and moist.   Eyes: Conjunctivae and EOM are normal. Pupils are equal, round, and reactive to light.   Neck: Normal range of motion. No JVD present.   Cardiovascular: Regular rhythm, normal heart sounds and intact distal pulses. Bradycardia present.   No murmur heard.  Pulmonary/Chest: Effort normal and breath sounds normal.   Abdominal: Soft. There is no tenderness.   Musculoskeletal: Normal range of motion. She exhibits no edema.   Lymphadenopathy:     She has no cervical adenopathy.   Neurological: She is alert and oriented to person, place, and time. No cranial nerve deficit.   Skin: Skin is warm and dry. No rash noted.   Psychiatric: She has a normal mood and affect. Her behavior is normal. Judgment and thought content normal.   Vitals reviewed.        ECG 12 Lead  Date/Time: 6/25/2019 12:22 PM  Performed by: Keon Mann MD  Authorized by: Keon Mann MD   Comparison: compared with previous ECG   Similar to previous ECG  Rhythm: sinus bradycardia  Conduction: conduction normal  ST Segments: ST segments normal  T Waves: T waves normal  Other: no other findings    Clinical impression: normal ECG            "   Assessment:       Diagnosis Plan   1. PAF (paroxysmal atrial fibrillation) (CMS/HCC)     2. Essential hypertension            Plan:       1.  She had one highly symptomatic AF episode and has had no evidence of recurrence.  She will remain on metoprolol.  She has some rivaroxaban in her cabinet and will take it if her symptoms recur, then call me immediately.  I do think her rate is a little low for her and may be contributing to her fatigue (which I think is multifactorial) so I asked her to cut it in half to 12.5mg BID and update me in two weeks.     2.  Her BP is within goal.    Sincerely,       Keon Mann MD

## 2019-08-07 ENCOUNTER — RESULTS ENCOUNTER (OUTPATIENT)
Dept: INTERNAL MEDICINE | Facility: CLINIC | Age: 69
End: 2019-08-07

## 2019-08-07 DIAGNOSIS — I10 ESSENTIAL HYPERTENSION: ICD-10-CM

## 2019-08-07 DIAGNOSIS — I48.0 PAF (PAROXYSMAL ATRIAL FIBRILLATION) (HCC): ICD-10-CM

## 2019-08-13 ENCOUNTER — TELEPHONE (OUTPATIENT)
Dept: GASTROENTEROLOGY | Facility: CLINIC | Age: 69
End: 2019-08-13

## 2019-08-15 ENCOUNTER — PREP FOR SURGERY (OUTPATIENT)
Dept: OTHER | Facility: HOSPITAL | Age: 69
End: 2019-08-15

## 2019-08-15 DIAGNOSIS — Z86.010 PERSONAL HISTORY OF COLONIC POLYPS: Primary | ICD-10-CM

## 2019-08-20 ENCOUNTER — TELEPHONE (OUTPATIENT)
Dept: CARDIOLOGY | Facility: CLINIC | Age: 69
End: 2019-08-20

## 2019-08-20 NOTE — TELEPHONE ENCOUNTER
Patient called, stated she saw you in June and you cut her Metoprolol in half at that time and pt has current b/p readings as follows:    8/5/2019 b/p 123/66, pulse 78    8/10/2019 b/p 124/62  pulse 70    8/20/2019 b/p 119/58, pulse 68    Pt reports being less tired, no c/o c/p or light headedness.    Phone number is [phone]    Thank you,     Ewa HOU

## 2019-08-20 NOTE — TELEPHONE ENCOUNTER
CALLED AND SPOKE WITH PATIENT.  SCHEDULED [facility] 11/04/2019 AT 10:30AM - ARRIVE 9:30AM.  WILL MAIL INSTRUCTIONS.

## 2019-09-29 DIAGNOSIS — I10 ESSENTIAL HYPERTENSION: ICD-10-CM

## 2019-09-30 RX ORDER — VALSARTAN AND HYDROCHLOROTHIAZIDE 320; 12.5 MG/1; MG/1
1 TABLET, FILM COATED ORAL DAILY
Qty: 90 TABLET | Refills: 2 | Status: SHIPPED | OUTPATIENT
Start: 2019-09-30 | End: 2020-07-17

## 2019-10-01 ENCOUNTER — TRANSCRIBE ORDERS (OUTPATIENT)
Dept: ADMINISTRATIVE | Facility: HOSPITAL | Age: 69
End: 2019-10-01

## 2019-10-01 DIAGNOSIS — Z12.31 SCREENING MAMMOGRAM FOR HIGH-RISK PATIENT: Primary | ICD-10-CM

## 2019-10-04 ENCOUNTER — OFFICE VISIT (OUTPATIENT)
Dept: INTERNAL MEDICINE | Facility: CLINIC | Age: 69
End: 2019-10-04

## 2019-10-04 VITALS
BODY MASS INDEX: 30.55 KG/M2 | RESPIRATION RATE: 16 BRPM | HEART RATE: 85 BPM | WEIGHT: 166 LBS | OXYGEN SATURATION: 92 % | HEIGHT: 62 IN | SYSTOLIC BLOOD PRESSURE: 120 MMHG | DIASTOLIC BLOOD PRESSURE: 82 MMHG | TEMPERATURE: 98.6 F

## 2019-10-04 DIAGNOSIS — J01.90 ACUTE NON-RECURRENT SINUSITIS, UNSPECIFIED LOCATION: Primary | ICD-10-CM

## 2019-10-04 DIAGNOSIS — J30.2 PERENNIAL ALLERGIC RHINITIS WITH SEASONAL VARIATION: ICD-10-CM

## 2019-10-04 DIAGNOSIS — J30.89 PERENNIAL ALLERGIC RHINITIS WITH SEASONAL VARIATION: ICD-10-CM

## 2019-10-04 DIAGNOSIS — J45.20 MILD INTERMITTENT ASTHMA WITHOUT COMPLICATION: ICD-10-CM

## 2019-10-04 PROCEDURE — 99213 OFFICE O/P EST LOW 20 MIN: CPT | Performed by: NURSE PRACTITIONER

## 2019-10-04 RX ORDER — AZITHROMYCIN 250 MG/1
TABLET, FILM COATED ORAL
Qty: 6 TABLET | Refills: 0 | Status: SHIPPED | OUTPATIENT
Start: 2019-10-04 | End: 2019-11-26

## 2019-10-04 NOTE — PROGRESS NOTES
"Cody Cheng is a 69 y.o. female presenting today for   Chief Complaint   Patient presents with   • Cough   • URI   • Chills   • sinus pressure       URI    This is a new problem. The current episode started in the past 7 days. The problem has been gradually worsening. Associated symptoms include congestion, coughing, rhinorrhea, sinus pain, sneezing and a sore throat. Pertinent negatives include no diarrhea, vomiting or wheezing. Treatments tried: hot teas, warm compress to face.        The following portions of the patient's history were reviewed and updated as appropriate: allergies, current medications, past family history, past medical history, past social history, past surgical history and problem list.    Review of Systems   Constitutional: Positive for chills. Negative for fever.   HENT: Positive for congestion, postnasal drip, rhinorrhea, sinus pressure, sneezing and sore throat.    Eyes: Positive for discharge.   Respiratory: Positive for cough. Negative for shortness of breath and wheezing.    Gastrointestinal: Negative for diarrhea and vomiting.   Neurological: Negative for headache.       Objective   Vitals:    10/04/19 1042   BP: 120/82   BP Location: Right arm   Patient Position: Sitting   Cuff Size: Adult   Pulse: 85   Resp: 16   Temp: 98.6 °F (37 °C)   TempSrc: Oral   SpO2: 92%   Weight: 75.3 kg (166 lb)   Height: 157.5 cm (62\")     Nursing notes and vitals reviewed.    Physical Exam   Constitutional: She appears well-developed and well-nourished. No distress.   HENT:   Right Ear: External ear and ear canal normal. Tympanic membrane is not erythematous and not bulging. A middle ear effusion is present.   Left Ear: Tympanic membrane, external ear and ear canal normal.   Nose: Mucosal edema and rhinorrhea present. Right sinus exhibits maxillary sinus tenderness and frontal sinus tenderness. Left sinus exhibits maxillary sinus tenderness and frontal sinus tenderness.   Mouth/Throat: Uvula " is midline, oropharynx is clear and moist and mucous membranes are normal.   Ethmoid sinuses TTP bilat   Eyes: Conjunctivae are normal. Right eye exhibits no discharge. Left eye exhibits no discharge.   Neck: Neck supple.   Cardiovascular: Regular rhythm and normal heart sounds.   Pulmonary/Chest: Effort normal and breath sounds normal.   Harsh cough   Lymphadenopathy:     She has no cervical adenopathy.         Assessment/Plan   Debbie was seen today for cough, uri, chills and sinus pressure.    Diagnoses and all orders for this visit:    Acute non-recurrent sinusitis, unspecified location  -     azithromycin (ZITHROMAX Z-JAYLEEN) 250 MG tablet; Take 2 tablets the first day, then 1 tablet daily for 4 days.    Perennial allergic rhinitis with seasonal variation    Mild intermittent asthma without complication    Continue current allergy and asthma therapies.      Return if symptoms worsen or fail to improve.

## 2019-10-04 NOTE — PATIENT INSTRUCTIONS
Sinusitis     Sinusitis  is inflammation or infection of your sinuses.      Common symptoms include:     · Pain, pressure, redness, or swelling around the forehead, cheeks, or eyes    · Thick yellow or green discharge from your nose    · Tenderness when you touch your face over your sinuses    · Dry cough that happens mostly at night or when you lie down    · Headache and face pain that is worse when you lean forward    · Tooth pain, or pain when you chew    Treatment for sinusitis:     · Acetaminophen (Tylenol)  decreases pain and fever. It is available without a doctor's order. Follow the package directions. Read the labels of all other medicines you are using to see if they also contain acetaminophen, or ask your pharmacist. Acetaminophen can cause liver damage if not taken correctly. Do not use more than 4 grams (4,000 milligrams) total of acetaminophen in one day.     · NSAIDs (Ibuprofen or Aleve) help decrease swelling, pain, and fever. This medicine is available with or without a doctor's order. Please follow the package instructions when taking these medications. If you take blood thinner medicine or have kidney disease, always ask your healthcare provider if NSAIDs are safe for you.     · Nasal steroid sprays (such as Flonase) may help decrease inflammation in your nose and sinuses.    · Decongestants (Sudafed)  help reduce swelling and drain mucus in the nose and sinuses. They may help you breathe easier.     · Antihistamines (Zyrtec, Xyzal, or Claritin)  help dry mucus in the nose and relieve sneezing.     · Antibiotics  treat bacterial infection.    · Rinse your sinuses.  Use a sinus rinse device to rinse your nasal passages with a saline (salt water) solution or distilled water. This will help thin the mucus in your nose. It will also help reduce swelling so you can breathe normally.     · Breathe in steam.  Heat a bowl of water until you see steam. Lean over the bowl and make a tent over your head with  a large towel. Breathe deeply for about 20 minutes. Be careful not to get too close to the steam or burn yourself. You can also breathe deeply when you take a hot shower.     · Sleep with your head elevated.  Place an extra pillow under your head before you go to sleep to help your sinuses drain.     · Increase your fluid intake.  Liquids will thin the mucus in your nose and help it drain. Avoid drinks that contain alcohol or caffeine.     · Rest as much as possible.

## 2019-10-28 ENCOUNTER — HOSPITAL ENCOUNTER (OUTPATIENT)
Dept: MAMMOGRAPHY | Facility: HOSPITAL | Age: 69
Discharge: HOME | End: 2019-10-28
Admitting: NURSE PRACTITIONER

## 2019-10-28 DIAGNOSIS — R92.8 ABNORMAL MAMMOGRAM OF LEFT BREAST: Primary | ICD-10-CM

## 2019-10-28 DIAGNOSIS — Z12.31 SCREENING MAMMOGRAM FOR HIGH-RISK PATIENT: ICD-10-CM

## 2019-10-28 PROCEDURE — 77063 BREAST TOMOSYNTHESIS BI: CPT

## 2019-10-28 PROCEDURE — 77067 SCR MAMMO BI INCL CAD: CPT

## 2019-11-04 ENCOUNTER — OUTSIDE FACILITY SERVICE (OUTPATIENT)
Dept: GASTROENTEROLOGY | Facility: CLINIC | Age: 69
End: 2019-11-04

## 2019-11-04 ENCOUNTER — LAB REQUISITION (OUTPATIENT)
Dept: LAB | Facility: HOSPITAL | Age: 69
End: 2019-11-04

## 2019-11-04 DIAGNOSIS — Z86.010 PERSONAL HISTORY OF COLONIC POLYPS: ICD-10-CM

## 2019-11-04 PROCEDURE — 45380 COLONOSCOPY AND BIOPSY: CPT | Performed by: INTERNAL MEDICINE

## 2019-11-04 PROCEDURE — 88305 TISSUE EXAM BY PATHOLOGIST: CPT | Performed by: INTERNAL MEDICINE

## 2019-11-05 LAB
CYTO UR: NORMAL
LAB AP CASE REPORT: NORMAL
LAB AP CLINICAL INFORMATION: NORMAL
PATH REPORT.FINAL DX SPEC: NORMAL
PATH REPORT.GROSS SPEC: NORMAL

## 2019-11-07 ENCOUNTER — TELEPHONE (OUTPATIENT)
Dept: INTERNAL MEDICINE | Facility: CLINIC | Age: 69
End: 2019-11-07

## 2019-11-07 ENCOUNTER — APPOINTMENT (OUTPATIENT)
Dept: ULTRASOUND IMAGING | Facility: HOSPITAL | Age: 69
End: 2019-11-07

## 2019-11-07 ENCOUNTER — HOSPITAL ENCOUNTER (OUTPATIENT)
Dept: MAMMOGRAPHY | Facility: HOSPITAL | Age: 69
Discharge: HOME | End: 2019-11-07
Admitting: NURSE PRACTITIONER

## 2019-11-07 DIAGNOSIS — R92.8 ABNORMAL MAMMOGRAM OF LEFT BREAST: ICD-10-CM

## 2019-11-07 PROCEDURE — 77065 DX MAMMO INCL CAD UNI: CPT

## 2019-11-07 PROCEDURE — G0279 TOMOSYNTHESIS, MAMMO: HCPCS

## 2019-11-07 RX ORDER — SIMVASTATIN 40 MG
TABLET ORAL
Qty: 90 TABLET | Refills: 3 | Status: SHIPPED | OUTPATIENT
Start: 2019-11-07 | End: 2020-11-24 | Stop reason: SDUPTHER

## 2019-11-07 NOTE — TELEPHONE ENCOUNTER
----- Message from LOUIS Coles sent at 11/7/2019  2:51 PM EST -----  Left unilateral mammogram is negative. Return to yearly screenings.

## 2019-11-19 LAB
ALBUMIN SERPL-MCNC: 4.4 G/DL (ref 3.5–5.2)
ALBUMIN/GLOB SERPL: 1.9 G/DL
ALP SERPL-CCNC: 73 U/L (ref 39–117)
ALT SERPL-CCNC: 22 U/L (ref 1–33)
AST SERPL-CCNC: 22 U/L (ref 1–32)
BILIRUB SERPL-MCNC: 0.9 MG/DL (ref 0.2–1.2)
BUN SERPL-MCNC: 19 MG/DL (ref 8–23)
BUN/CREAT SERPL: 22.9 (ref 7–25)
CALCIUM SERPL-MCNC: 9.3 MG/DL (ref 8.6–10.5)
CHLORIDE SERPL-SCNC: 101 MMOL/L (ref 98–107)
CHOLEST SERPL-MCNC: 147 MG/DL (ref 0–200)
CO2 SERPL-SCNC: 26.5 MMOL/L (ref 22–29)
CREAT SERPL-MCNC: 0.83 MG/DL (ref 0.57–1)
GLOBULIN SER CALC-MCNC: 2.3 GM/DL
GLUCOSE SERPL-MCNC: 114 MG/DL (ref 65–99)
HBA1C MFR BLD: 6.3 % (ref 4.8–5.6)
HDLC SERPL-MCNC: 52 MG/DL (ref 40–60)
LDLC SERPL CALC-MCNC: 65 MG/DL (ref 0–100)
POTASSIUM SERPL-SCNC: 4 MMOL/L (ref 3.5–5.2)
PROT SERPL-MCNC: 6.7 G/DL (ref 6–8.5)
SODIUM SERPL-SCNC: 141 MMOL/L (ref 136–145)
TRIGL SERPL-MCNC: 150 MG/DL (ref 0–150)
VLDLC SERPL CALC-MCNC: 30 MG/DL

## 2019-11-26 ENCOUNTER — OFFICE VISIT (OUTPATIENT)
Dept: INTERNAL MEDICINE | Facility: CLINIC | Age: 69
End: 2019-11-26

## 2019-11-26 VITALS
DIASTOLIC BLOOD PRESSURE: 76 MMHG | HEART RATE: 65 BPM | RESPIRATION RATE: 16 BRPM | HEIGHT: 62 IN | SYSTOLIC BLOOD PRESSURE: 128 MMHG | BODY MASS INDEX: 30.91 KG/M2 | OXYGEN SATURATION: 97 % | WEIGHT: 168 LBS | TEMPERATURE: 98.4 F

## 2019-11-26 DIAGNOSIS — I48.0 PAF (PAROXYSMAL ATRIAL FIBRILLATION) (HCC): ICD-10-CM

## 2019-11-26 DIAGNOSIS — R73.01 IFG (IMPAIRED FASTING GLUCOSE): ICD-10-CM

## 2019-11-26 DIAGNOSIS — R73.03 PREDIABETES: ICD-10-CM

## 2019-11-26 DIAGNOSIS — I10 ESSENTIAL HYPERTENSION: ICD-10-CM

## 2019-11-26 DIAGNOSIS — Z00.00 MEDICARE ANNUAL WELLNESS VISIT, SUBSEQUENT: Primary | ICD-10-CM

## 2019-11-26 DIAGNOSIS — E78.2 MIXED HYPERLIPIDEMIA: ICD-10-CM

## 2019-11-26 PROCEDURE — G0439 PPPS, SUBSEQ VISIT: HCPCS | Performed by: NURSE PRACTITIONER

## 2019-11-26 NOTE — PROGRESS NOTES
QUICK REFERENCE INFORMATION:  The ABCs of Providing the Annual Wellness Visit   CMS[employer] Medicare Subsequent Wellness Visit      Subjective   History of Present Illness    Debbie Cheng is a 69 y.o. female who presents for an Subsequent Wellness Visit. In addition, we addressed the following health issues:        HTN, Hyperlipidemia, PAF, IFG    She is on Lopressor 25mg BID, Valsartan 320/12.5mg for HTN, She has a history of PAF and is followed by Dr. Mann. She denies heart palpations, CP, SOA, edema.     She has IFG. She exercise at home, and stay on a low carb diet.     She is on Zocor 40mg nightly and tolerates it well. She is taking it nightly.       PMH, PSH, SocHx, FamHx, Allergies, and Medications: Reviewed and updated in the Visit Navigator.     Outpatient Medications Prior to Visit   Medication Sig Dispense Refill   • aspirin 81 MG chewable tablet Chew 81 mg daily.     • cetirizine (ZyrTEC) 10 MG tablet Take 10 mg by mouth daily.     • fluticasone (FLONASE) 50 MCG/ACT nasal spray 2 sprays into each nostril daily. Administer 2 sprays in each nostril for each dose.     • hydrocortisone (ANUSOL-HC) 2.5 % rectal cream Insert  into the rectum 2 (Two) Times a Day. 30 g 5   • metoprolol tartrate (LOPRESSOR) 25 MG tablet Take 1 tablet by mouth 2 (Two) Times a Day. (Patient taking differently: Take 12.5 mg by mouth 2 (Two) Times a Day.) 180 tablet 2   • Misc Natural Products (OSTEO BI-FLEX ADV TRIPLE ST PO) Take  by mouth.     • montelukast (SINGULAIR) 10 MG tablet Take 1 tablet by mouth Every Night. 90 tablet 3   • simvastatin (ZOCOR) 40 MG tablet TAKE 1 TABLET BY MOUTH EVERY NIGHT. 90 tablet 3   • valsartan-hydrochlorothiazide (DIOVAN-HCT) 320-12.5 MG per tablet TAKE 1 TABLET BY MOUTH DAILY. 90 tablet 2   • VENTOLIN  (90 Base) MCG/ACT inhaler USE 2 PUFFS EVERY 4-6 HOURS AS NEEDED FOR COUGHING ,WHEEZING OR SHORTNESS OF BREATH.  3   • vitamin D (ERGOCALCIFEROL) 32723 units capsule capsule  Take 1 capsule by mouth Every 7 (Seven) Days. 12 capsule 3   • azithromycin (ZITHROMAX Z-JAYLEEN) 250 MG tablet Take 2 tablets the first day, then 1 tablet daily for 4 days. 6 tablet 0     No facility-administered medications prior to visit.        Patient Active Problem List   Diagnosis   • PAF (paroxysmal atrial fibrillation) (CMS/HCC)   • Hypertension   • Vitamin D deficiency   • IFG (impaired fasting glucose)   • Medicare annual wellness visit, subsequent   • Hyperlipidemia   • Eczema   • Anemia of unknown etiology   • Postmenopausal   • Varicose veins of both lower extremities with pain   • Family history of renal cancer       Health Habits:  Dental Exam. up to date  Eye Exam. up to date  Exercise: 3 times/week.  Current exercise activities include: gym in [place]    Social:  See review in SnapShot activity and in SocHx section of Visit Navigator.    Health Risk Assessment:  The patient has completed a Health Risk Assessment. This has been reviewed with them and has been scanned into Media Manager as a separate document.    Current Medical Providers:  Patient Care Team:  Terra Reddy APRN as PCP - General (Family Medicine)  Terra Reddy APRN as PCP - Claims Attributed  Trace Onofre MD as Consulting Physician (Ophthalmology)  Keon Mann MD as Consulting Physician (Cardiology)  Parrish Oliver MD as Consulting Physician (Orthopedic Surgery)  Emily Aranda MD as Consulting Physician (Dermatology)    [facility] providers who are involved in the care of this patient are listed above. Additional providers and suppliers are listed below:  Dr. Mann    Recent Hospitalizations:  No hospitalization(s) within the last year..    Age-appropriate Screening Schedule:  Refer to the list below for future screening recommendations based on patient's age. Orders for these recommended tests are listed in the plan section. The patient has been provided with a written plan.    Health Maintenance    Topic Date Due   • ZOSTER VACCINE (2 of 3) 02/26/2011   • MEDICARE ANNUAL WELLNESS  08/28/2019   • LIPID PANEL  11/19/2020   • MAMMOGRAM  11/07/2021   • COLONOSCOPY  11/04/2024   • TDAP/TD VACCINES (3 - Td) 08/26/2026   • HEPATITIS C SCREENING  Completed   • INFLUENZA VACCINE  Completed   • PNEUMOCOCCAL VACCINES (65+ LOW/MEDIUM RISK)  Completed       Depression Screen:   PHQ-2/PHQ-9 Depression Screening 11/26/2019   Little interest or pleasure in doing things 0   Feeling down, depressed, or hopeless 0   Trouble falling or staying asleep, or sleeping too much 1   Feeling tired or having little energy 1   Poor appetite or overeating 0   Feeling bad about yourself - or that you are a failure or have let yourself or your family down 0   Trouble concentrating on things, such as reading the newspaper or watching television 0   Moving or speaking so slowly that other people could have noticed. Or the opposite - being so fidgety or restless that you have been moving around a lot more than usual 0   Thoughts that you would be better off dead, or of hurting yourself in some way 0   Total Score 2   If you checked off any problems, how difficult have these problems made it for you to do your work, take care of things at home, or get along with other people? Not difficult at all       Functional and Cognitive Screening:  Functional & Cognitive Status 11/26/2019   Do you have difficulty preparing food and eating? No   Do you have difficulty bathing yourself, getting dressed or grooming yourself? No   Do you have difficulty using the toilet? No   Do you have difficulty moving around from place to place? No   Do you have trouble with steps or getting out of a bed or a chair? No   Current Diet Well Balanced Diet   Dental Exam Up to date   Eye Exam Up to date   Exercise (times per week) 4 times per week   Current Exercise Activities Include Walking   Do you need help using the phone?  No   Are you deaf or do you have serious  "difficulty hearing?  No   Do you need help with transportation? No   Do you need help shopping? No   Do you need help preparing meals?  No   Do you need help with housework?  No   Do you need help with laundry? No   Do you need help taking your medications? No   Do you need help managing money? No   Do you ever drive or ride in a car without wearing a seat belt? No   Have you felt unusual stress, anger or loneliness in the last month? No   Who do you live with? Spouse   If you need help, do you have trouble finding someone available to you? No   Have you been bothered in the last four weeks by sexual problems? No   Do you have difficulty concentrating, remembering or making decisions? No       Does the patient have evidence of cognitive impairment? No    Identification of Risk Factors:  Risk factors include: Advance Directive Discussion  Breast Cancer/Mammogram Screening  Cardiovascular risk  Immunizations Discussed/Encouraged (specific immunizations; Shingrix )  Osteoprorosis Risk.    Review of Systems   Constitutional: Negative.    HENT: Negative.    Eyes: Negative.    Respiratory: Negative.    Cardiovascular: Negative.  Negative for chest pain and leg swelling.   Gastrointestinal: Negative.    Genitourinary: Negative.    Musculoskeletal: Negative.    Neurological: Negative.    Hematological: Negative.    Psychiatric/Behavioral: Negative.        /76   Pulse 65   Temp 98.4 °F (36.9 °C) (Oral)   Resp 16   Ht 157.5 cm (62\")   Wt 76.2 kg (168 lb)   SpO2 97%   BMI 30.73 kg/m²     General Appearance:    Alert, cooperative, no distress, appears stated age   Head:    Normocephalic, without obvious abnormality, atraumatic   Eyes:    PERRL, conjunctiva/corneas clear, EOM's intact, fundi     benign, both eyes   Ears:    Normal TM's and external ear canals, both ears   Nose:   Nares normal, septum midline, mucosa normal, no drainage     or sinus tenderness   Throat:   Lips, mucosa, and tongue normal; teeth and " "gums normal   Neck:   Supple, symmetrical, trachea midline, no adenopathy;     thyroid:  no enlargement/tenderness/nodules; no carotid    bruit or JVD   Back:     Symmetric, no curvature, ROM normal, no CVA tenderness   Lungs:     Clear to auscultation bilaterally, respirations unlabored   Chest Wall:    No tenderness or deformity    Heart:    Regular rate and rhythm, S1 and S2 normal, no murmur, rub    or gallop   Breast Exam:    No tenderness, masses, or nipple abnormality   Abdomen:     Soft, non-tender, bowel sounds active all four quadrants,     no masses, no organomegaly   Genitalia:    deferred   Rectal:    deferred   Extremities:   Extremities normal, atraumatic, no cyanosis or edema   Pulses:   2+ and symmetric all extremities   Skin:   Skin color, texture, turgor normal, no rashes or lesions   Lymph nodes:   Cervical, supraclavicular, and axillary nodes normal   Neurologic:   CNII-XII intact, normal strength, sensation and reflexes     throughout       Objective     Vitals:    11/26/19 0806   BP: 128/76   Pulse: 65   Resp: 16   Temp: 98.4 °F (36.9 °C)   TempSrc: Oral   SpO2: 97%   Weight: 76.2 kg (168 lb)   Height: 157.5 cm (62\")       Body mass index is 30.73 kg/m².    Assessment/Plan   Patient Self-Management and Personalized Health Advice  The patient has been provided with information about: diet, exercise, weight management, prevention of cardiac or vascular disease, fall prevention and mental health concerns and preventive services including:   · Annual Wellness Visit (AWV)  · Bone Density Measurements  · Cardiovascular Disease Screening Tests (may do this order every 5 years in beneficiaries without signs or symptoms of cardiovascular disease)  · Influenza Vaccine and Administration  · Pneumococcal Vaccine and Administration  · Screening Mammography .    Discussed the patient's BMI with her. The BMI is in the acceptable range.    Orders:     Diagnosis Plan   1. Medicare annual wellness visit, " subsequent     2. Essential hypertension  Comprehensive metabolic panel    Lipid panel    Hemoglobin A1c    CBC & Differential    metoprolol tartrate (LOPRESSOR) 25 MG tablet   3. Mixed hyperlipidemia  Comprehensive metabolic panel    Lipid panel    CBC & Differential   4. PAF (paroxysmal atrial fibrillation) (CMS/HCC)  CBC & Differential    metoprolol tartrate (LOPRESSOR) 25 MG tablet   5. IFG (impaired fasting glucose)  CBC & Differential   6. Prediabetes  Comprehensive metabolic panel    Hemoglobin A1c    CBC & Differential     Follow Up:    6 months with fasting labs    An After Visit Summary and PPPS with all of these plans were given to the patient.

## 2019-11-27 ENCOUNTER — TELEPHONE (OUTPATIENT)
Dept: INTERNAL MEDICINE | Facility: CLINIC | Age: 69
End: 2019-11-27

## 2019-11-27 DIAGNOSIS — E11.69 TYPE 2 DIABETES MELLITUS WITH OTHER SPECIFIED COMPLICATION, UNSPECIFIED WHETHER LONG TERM INSULIN USE (HCC): Primary | ICD-10-CM

## 2019-11-27 NOTE — TELEPHONE ENCOUNTER
Send in a formulary meter with strips and lancets to check a fasting glucose daily with a goal of < 100.

## 2019-11-27 NOTE — TELEPHONE ENCOUNTER
Patient has thought about discussion from visit 11-26-19.  She would like to proceed with getting a glucometer and strips for periodic testing.  She also would like to know if PCP thought she should test at a particular time of day.   [facility] [place]

## 2019-12-01 ENCOUNTER — RESULTS ENCOUNTER (OUTPATIENT)
Dept: INTERNAL MEDICINE | Facility: CLINIC | Age: 69
End: 2019-12-01

## 2019-12-01 DIAGNOSIS — R73.03 PREDIABETES: ICD-10-CM

## 2019-12-01 DIAGNOSIS — I10 ESSENTIAL HYPERTENSION: ICD-10-CM

## 2019-12-01 DIAGNOSIS — E78.2 MIXED HYPERLIPIDEMIA: ICD-10-CM

## 2019-12-04 RX ORDER — BLOOD-GLUCOSE METER
KIT MISCELLANEOUS
Qty: 1 EACH | Refills: 0 | Status: SHIPPED | OUTPATIENT
Start: 2019-12-04 | End: 2020-11-24 | Stop reason: SDUPTHER

## 2020-01-20 ENCOUNTER — TELEPHONE (OUTPATIENT)
Dept: INTERNAL MEDICINE | Facility: CLINIC | Age: 70
End: 2020-01-20

## 2020-01-20 ENCOUNTER — OFFICE VISIT (OUTPATIENT)
Dept: INTERNAL MEDICINE | Facility: CLINIC | Age: 70
End: 2020-01-20

## 2020-01-20 VITALS
WEIGHT: 168.4 LBS | OXYGEN SATURATION: 96 % | SYSTOLIC BLOOD PRESSURE: 132 MMHG | HEIGHT: 62 IN | HEART RATE: 75 BPM | RESPIRATION RATE: 18 BRPM | BODY MASS INDEX: 30.99 KG/M2 | DIASTOLIC BLOOD PRESSURE: 80 MMHG | TEMPERATURE: 98.5 F

## 2020-01-20 DIAGNOSIS — J01.00 ACUTE MAXILLARY SINUSITIS, RECURRENCE NOT SPECIFIED: Primary | ICD-10-CM

## 2020-01-20 PROCEDURE — 99213 OFFICE O/P EST LOW 20 MIN: CPT | Performed by: NURSE PRACTITIONER

## 2020-01-20 NOTE — PROGRESS NOTES
Possible Sinusitis    Subjective     Debbie Cheng is a 69 y.o. female being seen for a follow up appointment today regarding recurring sinusitis.  She has been sick for about 2 weeks. She has sinus pressure, cough, and congestion. [place] SOA, wheezing, fever. She is on singulair 10mg, Zyrtec 10mg and Flonase 2 squits daily. She has been on allergy shots 4 times in the past.       History of Present Illness     Allergies   Allergen Reactions   • Penicillins Anaphylaxis and Swelling   • Erythromycin Other (See Comments)     JOINT PAIN AND SWELLING         Current Outpatient Medications:   •  aspirin 81 MG chewable tablet, Chew 81 mg daily., Disp: , Rfl:   •  cetirizine (ZyrTEC) 10 MG tablet, Take 10 mg by mouth daily., Disp: , Rfl:   •  fluticasone (FLONASE) 50 MCG/ACT nasal spray, 2 sprays into each nostril daily. Administer 2 sprays in each nostril for each dose., Disp: , Rfl:   •  glucose blood (ACCU-CHEK ACTIVE STRIPS) test strip, Use one test strip daily to check fasting glucose for diabetes monitoring. E11.9, Disp: 100 each, Rfl: 12  •  glucose monitor monitoring kit, Use glucose meter to check fasting glucose once daily for diabetes monitoring. E11.9, Disp: 1 each, Rfl: 0  •  hydrocortisone (ANUSOL-HC) 2.5 % rectal cream, Insert  into the rectum 2 (Two) Times a Day., Disp: 30 g, Rfl: 5  •  Lancets (ACCU-CHEK SOFT TOUCH) lancets, Use one lancet daily to check fasting glucose for diabetes monitoring. e11.9, Disp: 100 each, Rfl: 12  •  metoprolol tartrate (LOPRESSOR) 25 MG tablet, Take 0.5 tablets by mouth 2 (Two) Times a Day., Disp: 60 tablet, Rfl: 1  •  Misc Natural Products (OSTEO BI-FLEX ADV TRIPLE ST PO), Take  by mouth., Disp: , Rfl:   •  montelukast (SINGULAIR) 10 MG tablet, Take 1 tablet by mouth Every Night., Disp: 90 tablet, Rfl: 3  •  simvastatin (ZOCOR) 40 MG tablet, TAKE 1 TABLET BY MOUTH EVERY NIGHT., Disp: 90 tablet, Rfl: 3  •  valsartan-hydrochlorothiazide (DIOVAN-HCT) 320-12.5 MG per tablet, TAKE 1  TABLET BY MOUTH DAILY., Disp: 90 tablet, Rfl: 2  •  VENTOLIN  (90 Base) MCG/ACT inhaler, USE 2 PUFFS EVERY 4-6 HOURS AS NEEDED FOR COUGHING ,WHEEZING OR SHORTNESS OF BREATH., Disp: , Rfl: 3  •  vitamin D (ERGOCALCIFEROL) 43655 units capsule capsule, Take 1 capsule by mouth Every 7 (Seven) Days., Disp: 12 capsule, Rfl: 3    The following portions of the patient's history were reviewed and updated as appropriate: allergies, current medications, past family history, past medical history, past social history, past surgical history and problem list.    Review of Systems   Constitutional: Negative.    HENT: Positive for congestion, nosebleeds, postnasal drip, rhinorrhea, sinus pressure, sinus pain, sneezing and sore throat.    Eyes: Negative.    Respiratory: Positive for cough. Negative for shortness of breath, wheezing and stridor.    Genitourinary: Negative.    Musculoskeletal: Negative.    Allergic/Immunologic: Positive for environmental allergies.   Neurological: Negative.    Hematological: Negative.    Psychiatric/Behavioral: Negative.        Assessment     Physical Exam   Constitutional: She is oriented to person, place, and time. She appears well-developed and well-nourished. No distress.   HENT:   Head: Normocephalic.   Right Ear: External ear normal. No middle ear effusion.   Left Ear: External ear normal.  No middle ear effusion.   Nose: Mucosal edema and rhinorrhea present. Right sinus exhibits maxillary sinus tenderness and frontal sinus tenderness. Left sinus exhibits maxillary sinus tenderness and frontal sinus tenderness.   Mouth/Throat: Oropharynx is clear and moist.   Cardiovascular: Normal rate, regular rhythm, normal heart sounds and intact distal pulses.   No murmur heard.  Pulmonary/Chest: Effort normal and breath sounds normal. No respiratory distress. She has no wheezes.   Musculoskeletal: She exhibits no edema.   Neurological: She is alert and oriented to person, place, and time.   Skin:  Skin is warm and dry.   Psychiatric: She has a normal mood and affect. Her behavior is normal. Thought content normal.   Vitals reviewed.      Plan      Diagnosis Plan   1. Acute maxillary sinusitis, recurrence not specified  clarithromycin XL (BIAXIN XL) 500 MG 24 hr tablet     Discussed ENT referral and CT sinuses, she declines at this time. 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 Reviewed environmental controls and wearing a mask to clean the house.     Follow up as needed

## 2020-01-20 NOTE — TELEPHONE ENCOUNTER
Pharmacy asks if clarithromycin XL (BIAXIN XL) 500 MG 24 hr tablet can be changed to non XR due to cost of $170 to pt for XR.

## 2020-01-21 NOTE — TELEPHONE ENCOUNTER
Called pharmacist back.  They were able to use a discount card, and patient already picked up the original RX.

## 2020-03-30 DIAGNOSIS — E55.9 VITAMIN D DEFICIENCY: ICD-10-CM

## 2020-03-30 RX ORDER — ERGOCALCIFEROL 1.25 MG/1
CAPSULE ORAL
Qty: 12 CAPSULE | Refills: 3 | Status: SHIPPED | OUTPATIENT
Start: 2020-03-30 | End: 2021-02-25

## 2020-04-27 DIAGNOSIS — I48.0 PAF (PAROXYSMAL ATRIAL FIBRILLATION) (HCC): ICD-10-CM

## 2020-04-27 DIAGNOSIS — I10 ESSENTIAL HYPERTENSION: ICD-10-CM

## 2020-05-18 DIAGNOSIS — E11.69 TYPE 2 DIABETES MELLITUS WITH OTHER SPECIFIED COMPLICATION, UNSPECIFIED WHETHER LONG TERM INSULIN USE (HCC): Primary | ICD-10-CM

## 2020-05-18 DIAGNOSIS — E78.2 MIXED HYPERLIPIDEMIA: ICD-10-CM

## 2020-05-18 DIAGNOSIS — I10 ESSENTIAL HYPERTENSION: ICD-10-CM

## 2020-05-19 ENCOUNTER — LAB (OUTPATIENT)
Dept: INTERNAL MEDICINE | Facility: CLINIC | Age: 70
End: 2020-05-19

## 2020-05-19 DIAGNOSIS — R73.03 PREDIABETES: ICD-10-CM

## 2020-05-19 DIAGNOSIS — I48.0 PAF (PAROXYSMAL ATRIAL FIBRILLATION) (HCC): ICD-10-CM

## 2020-05-19 DIAGNOSIS — I10 ESSENTIAL HYPERTENSION: ICD-10-CM

## 2020-05-19 DIAGNOSIS — E78.2 MIXED HYPERLIPIDEMIA: ICD-10-CM

## 2020-05-19 DIAGNOSIS — R73.01 IFG (IMPAIRED FASTING GLUCOSE): ICD-10-CM

## 2020-05-20 LAB
ALBUMIN SERPL-MCNC: 4.4 G/DL (ref 3.5–5.2)
ALBUMIN/GLOB SERPL: 1.8 G/DL
ALP SERPL-CCNC: 68 U/L (ref 39–117)
ALT SERPL-CCNC: 19 U/L (ref 1–33)
AST SERPL-CCNC: 19 U/L (ref 1–32)
BASOPHILS # BLD AUTO: 0.01 10*3/MM3 (ref 0–0.2)
BASOPHILS NFR BLD AUTO: 0.2 % (ref 0–1.5)
BILIRUB SERPL-MCNC: 0.8 MG/DL (ref 0.2–1.2)
BUN SERPL-MCNC: 26 MG/DL (ref 8–23)
BUN/CREAT SERPL: 29.2 (ref 7–25)
CALCIUM SERPL-MCNC: 9.6 MG/DL (ref 8.6–10.5)
CHLORIDE SERPL-SCNC: 101 MMOL/L (ref 98–107)
CHOLEST SERPL-MCNC: 163 MG/DL (ref 0–200)
CO2 SERPL-SCNC: 25.2 MMOL/L (ref 22–29)
CREAT SERPL-MCNC: 0.89 MG/DL (ref 0.57–1)
EOSINOPHIL # BLD AUTO: 0.11 10*3/MM3 (ref 0–0.4)
EOSINOPHIL NFR BLD AUTO: 2 % (ref 0.3–6.2)
ERYTHROCYTE [DISTWIDTH] IN BLOOD BY AUTOMATED COUNT: 13 % (ref 12.3–15.4)
GLOBULIN SER CALC-MCNC: 2.5 GM/DL
GLUCOSE SERPL-MCNC: 119 MG/DL (ref 65–99)
HBA1C MFR BLD: 6.2 % (ref 4.8–5.6)
HCT VFR BLD AUTO: 36.6 % (ref 34–46.6)
HDLC SERPL-MCNC: 52 MG/DL (ref 40–60)
HGB BLD-MCNC: 12.5 G/DL (ref 12–15.9)
IMM GRANULOCYTES # BLD AUTO: 0.01 10*3/MM3 (ref 0–0.05)
IMM GRANULOCYTES NFR BLD AUTO: 0.2 % (ref 0–0.5)
LDLC SERPL CALC-MCNC: 81 MG/DL (ref 0–100)
LDLC/HDLC SERPL: 1.57 {RATIO}
LYMPHOCYTES # BLD AUTO: 1.59 10*3/MM3 (ref 0.7–3.1)
LYMPHOCYTES NFR BLD AUTO: 29.6 % (ref 19.6–45.3)
MCH RBC QN AUTO: 31 PG (ref 26.6–33)
MCHC RBC AUTO-ENTMCNC: 34.2 G/DL (ref 31.5–35.7)
MCV RBC AUTO: 90.8 FL (ref 79–97)
MONOCYTES # BLD AUTO: 0.79 10*3/MM3 (ref 0.1–0.9)
MONOCYTES NFR BLD AUTO: 14.7 % (ref 5–12)
NEUTROPHILS # BLD AUTO: 2.86 10*3/MM3 (ref 1.7–7)
NEUTROPHILS NFR BLD AUTO: 53.3 % (ref 42.7–76)
NRBC BLD AUTO-RTO: 0 /100 WBC (ref 0–0.2)
PLATELET # BLD AUTO: 223 10*3/MM3 (ref 140–450)
POTASSIUM SERPL-SCNC: 3.9 MMOL/L (ref 3.5–5.2)
PROT SERPL-MCNC: 6.9 G/DL (ref 6–8.5)
RBC # BLD AUTO: 4.03 10*6/MM3 (ref 3.77–5.28)
SODIUM SERPL-SCNC: 139 MMOL/L (ref 136–145)
TRIGL SERPL-MCNC: 148 MG/DL (ref 0–150)
VLDLC SERPL CALC-MCNC: 29.6 MG/DL
WBC # BLD AUTO: 5.37 10*3/MM3 (ref 3.4–10.8)

## 2020-05-26 ENCOUNTER — OFFICE VISIT (OUTPATIENT)
Dept: INTERNAL MEDICINE | Facility: CLINIC | Age: 70
End: 2020-05-26

## 2020-05-26 VITALS
BODY MASS INDEX: 30.4 KG/M2 | HEART RATE: 64 BPM | HEIGHT: 62 IN | WEIGHT: 165.2 LBS | SYSTOLIC BLOOD PRESSURE: 130 MMHG | DIASTOLIC BLOOD PRESSURE: 80 MMHG | OXYGEN SATURATION: 97 % | TEMPERATURE: 97.6 F | RESPIRATION RATE: 16 BRPM

## 2020-05-26 DIAGNOSIS — E55.9 VITAMIN D DEFICIENCY: ICD-10-CM

## 2020-05-26 DIAGNOSIS — R73.01 IFG (IMPAIRED FASTING GLUCOSE): ICD-10-CM

## 2020-05-26 DIAGNOSIS — E78.2 MIXED HYPERLIPIDEMIA: ICD-10-CM

## 2020-05-26 DIAGNOSIS — L30.9 ECZEMA, UNSPECIFIED TYPE: ICD-10-CM

## 2020-05-26 DIAGNOSIS — I10 ESSENTIAL HYPERTENSION: Primary | ICD-10-CM

## 2020-05-26 PROCEDURE — 99214 OFFICE O/P EST MOD 30 MIN: CPT | Performed by: NURSE PRACTITIONER

## 2020-05-26 RX ORDER — MONTELUKAST SODIUM 10 MG/1
10 TABLET ORAL NIGHTLY
Qty: 90 TABLET | Refills: 3 | Status: SHIPPED | OUTPATIENT
Start: 2020-05-26 | End: 2021-05-20

## 2020-05-26 NOTE — PROGRESS NOTES
Chief Complaint   Patient presents with   • Follow-up     6 x month, lab results    • Hypertension   • Hyperlipidemia       Subjective     Debbie Cheng is a 69 y.o. female being seen for a follow up appointment today regarding HTN, hyperlipidemia, PAF, and IFG. She has been walking every day with her neighbor, 2 miles. She is on Diovan /12.5 mg daily for BP. BP at home 120s/80s. She denies any swelling, CP, heart palpitations. She has history of PAF, she takes Lopressor 25mg BID for both rate control and HTN. She is followed by Dr. Mann for PAF, next appt June 2020. She takes Zocor 40 mg at night for cholesterol. She has not been checking her glucose at home.       History of Present Illness     Allergies   Allergen Reactions   • Penicillins Anaphylaxis and Swelling   • Erythromycin Other (See Comments)     JOINT PAIN AND SWELLING         Current Outpatient Medications:   •  aspirin 81 MG chewable tablet, Chew 81 mg daily., Disp: , Rfl:   •  cetirizine (ZyrTEC) 10 MG tablet, Take 10 mg by mouth daily., Disp: , Rfl:   •  fluticasone (FLONASE) 50 MCG/ACT nasal spray, 2 sprays into each nostril daily. Administer 2 sprays in each nostril for each dose., Disp: , Rfl:   •  glucose blood (ACCU-CHEK ACTIVE STRIPS) test strip, Use one test strip daily to check fasting glucose for diabetes monitoring. E11.9, Disp: 100 each, Rfl: 12  •  glucose monitor monitoring kit, Use glucose meter to check fasting glucose once daily for diabetes monitoring. E11.9, Disp: 1 each, Rfl: 0  •  hydrocortisone (ANUSOL-HC) 2.5 % rectal cream, Insert  into the rectum 2 (Two) Times a Day., Disp: 30 g, Rfl: 5  •  Lancets (ACCU-CHEK SOFT TOUCH) lancets, Use one lancet daily to check fasting glucose for diabetes monitoring. e11.9, Disp: 100 each, Rfl: 12  •  metoprolol tartrate (LOPRESSOR) 25 MG tablet, TAKE 1 TABLET BY MOUTH TWICE A DAY, Disp: 180 tablet, Rfl: 0  •  Misc Natural Products (OSTEO BI-FLEX ADV TRIPLE ST PO), Take  by mouth., Disp:  , Rfl:   •  montelukast (SINGULAIR) 10 MG tablet, Take 1 tablet by mouth Every Night., Disp: 90 tablet, Rfl: 3  •  simvastatin (ZOCOR) 40 MG tablet, TAKE 1 TABLET BY MOUTH EVERY NIGHT., Disp: 90 tablet, Rfl: 3  •  valsartan-hydrochlorothiazide (DIOVAN-HCT) 320-12.5 MG per tablet, TAKE 1 TABLET BY MOUTH DAILY., Disp: 90 tablet, Rfl: 2  •  VENTOLIN  (90 Base) MCG/ACT inhaler, USE 2 PUFFS EVERY 4-6 HOURS AS NEEDED FOR COUGHING ,WHEEZING OR SHORTNESS OF BREATH., Disp: , Rfl: 3  •  vitamin D (ERGOCALCIFEROL) 1.25 MG (23921 UT) capsule capsule, TAKE ONE CAPSULE BY MOUTH EVERY 7 DAYS., Disp: 12 capsule, Rfl: 3    The following portions of the patient's history were reviewed and updated as appropriate: allergies, current medications, past family history, past medical history, past social history, past surgical history and problem list.    Review of Systems   Constitutional: Negative.    HENT: Negative.    Eyes: Negative.    Respiratory: Negative.  Negative for apnea, chest tightness, shortness of breath, wheezing and stridor.    Cardiovascular: Negative.  Negative for chest pain, palpitations and leg swelling.   Gastrointestinal: Negative.    Endocrine: Negative.    Genitourinary: Negative.  Negative for menstrual problem and pelvic pain.   Musculoskeletal: Negative.  Negative for arthralgias.   Skin: Negative.    Allergic/Immunologic: Negative for environmental allergies and food allergies.   Neurological: Negative.    Hematological: Negative for adenopathy. Does not bruise/bleed easily.   Psychiatric/Behavioral: Negative.    All other systems reviewed and are negative.      Assessment     Physical Exam   Constitutional: She is oriented to person, place, and time. She appears well-developed and well-nourished.   HENT:   Head: Normocephalic.   Right Ear: External ear normal.   Left Ear: External ear normal.   Neck: Neck supple. No thyromegaly present.   Cardiovascular: Normal rate, regular rhythm and normal heart  sounds.   No murmur heard.  Pulmonary/Chest: Effort normal and breath sounds normal. No stridor. No respiratory distress.   Musculoskeletal: She exhibits no edema.   Neurological: She is alert and oriented to person, place, and time.   Skin: Skin is warm and dry.   Psychiatric: She has a normal mood and affect. Her behavior is normal.   Vitals reviewed.      Plan     Her fasting labs were reviewed with the patient from last week.     Debbie was seen today for follow-up, hypertension and hyperlipidemia.    Diagnoses and all orders for this visit:    Essential hypertension  -     Comprehensive metabolic panel; Future  -     Lipid panel; Future  -     Hemoglobin A1c; Future  -     CBC & Differential; Future    Mixed hyperlipidemia  -     CBC & Differential; Future    IFG (impaired fasting glucose)  -     Comprehensive metabolic panel; Future  -     Hemoglobin A1c; Future  -     CBC & Differential; Future    Vitamin D deficiency  -     CBC & Differential; Future  -     Vitamin D 1,25 Dihydroxy; Future    Eczema, unspecified type  -     montelukast (Singulair) 10 MG tablet; Take 1 tablet by mouth Every Night.        Follow up in 6 months with Annual wellness visit

## 2020-07-02 NOTE — PROGRESS NOTES
RM:________     PCP: Terra Reddy APRN    : 1950  AGE: 69 y.o.  EST PATIENT  REASON FOR VISIT/  CC:  AFIB       WT: ____________ BP: __________L __________R HR______    CHEST PAIN: _____________    SOA: _____________PALPS: _______________     LIGHTHEADED: ___________FATIGUE: ________________ EDEMA __________    ALLERGIES:Penicillins and Erythromycin SMOKING HISTORY:  Social History     Tobacco Use   • Smoking status: Never Smoker   • Smokeless tobacco: Never Used   • Tobacco comment: CAFFEINE USE : MINIMAL, ONLY DRINKS COFFEE IN THE WINTER, ONE CUP   Substance Use Topics   • Alcohol use: No     Drinks per session: 1 or 2     Binge frequency: Weekly   • Drug use: No     CAFFEINE USE_________________  ALCOHOL ______________________    Below is the patient's most recent value for Albumin, ALT, AST, BUN, Calcium, Chloride, Cholesterol, CO2, Creatinine, GFR, Glucose, HDL, Hematocrit, Hemoglobin, Hemoglobin A1C, LDL, Magnesium, Phosphorus, Platelets, Potassium, PSA, Sodium, Triglycerides, TSH and WBC.   Lab Results   Component Value Date    ALBUMIN 4.40 2020    ALT 19 2020    AST 19 2020    BUN 26 (H) 2020    CALCIUM 9.6 2020     2020    CO2 25.2 2020    CREATININE 0.89 2020     (H) 2020    HDL 52 2020    HCT 36.6 2020    HGB 12.5 2020    HGBA1C 6.20 (H) 2020    LDL 81 2020    MG 2.2 04/10/2016     2020    K 3.9 2020     2020    TRIG 148 2020    TSH 6.660 (H) 2017    WBC 5.37 2020          NEW DIAGNOSIS/ SURGERY/ HOSP OR ED VISITS: ______________________    __________________________________________________________________      RECENT LABS OR DIAGNOSTIC TESTING:  _____________________________    __________________________________________________________________      ASSESSMENT/ PLAN:  _______________________________________________    __________________________________________________________________

## 2020-07-07 ENCOUNTER — OFFICE VISIT (OUTPATIENT)
Dept: CARDIOLOGY | Facility: CLINIC | Age: 70
End: 2020-07-07

## 2020-07-07 VITALS
HEIGHT: 62 IN | BODY MASS INDEX: 30.46 KG/M2 | HEART RATE: 64 BPM | WEIGHT: 165.5 LBS | DIASTOLIC BLOOD PRESSURE: 66 MMHG | SYSTOLIC BLOOD PRESSURE: 128 MMHG

## 2020-07-07 DIAGNOSIS — I48.0 PAF (PAROXYSMAL ATRIAL FIBRILLATION) (HCC): ICD-10-CM

## 2020-07-07 DIAGNOSIS — I10 ESSENTIAL HYPERTENSION: ICD-10-CM

## 2020-07-07 PROBLEM — J01.00 ACUTE MAXILLARY SINUSITIS: Status: RESOLVED | Noted: 2020-01-20 | Resolved: 2020-07-07

## 2020-07-07 PROCEDURE — 93000 ELECTROCARDIOGRAM COMPLETE: CPT | Performed by: INTERNAL MEDICINE

## 2020-07-07 PROCEDURE — 99213 OFFICE O/P EST LOW 20 MIN: CPT | Performed by: INTERNAL MEDICINE

## 2020-07-07 NOTE — PROGRESS NOTES
Date of Office Visit: 2020  Encounter Provider: Karen Colin MA  Place of Service: [facility]  Patient Name: Debbie Cheng  :1950    Chief Complaint   Patient presents with   • Atrial Fibrillation   :     HPI: Debbie Cheng is a 69 y.o. female who presents today to followup. She is an extremely pleasant lady who had one episode of highly symptomatic atrial fibrillation in May 2015. She converted on her own. She was started on rivaroxaban and metoprolol. In 2015, I stopped the NOAC when it was clear that she had experienced no recurrence.     She has tried to remain active, and is limited only by joint pain. She denies any cardiac symptoms at all except for mild, rare isolated skipped beats.    Past Medical History:   Diagnosis Date   • Acute bacterial conjunctivitis of left eye 10/20/2016   • Allergic rhinitis    • Asthma    • Bronchitis    • Colon polyp    • Hyperlipidemia    • Hypertension    • PAF (paroxysmal atrial fibrillation) (CMS/[facility])     one episode, 2015; was briefly treated with Xarelto   • Pneumonia    • Rotator cuff tendonitis    • Type 2 diabetes mellitus (CMS/[facility])    • UTI (urinary tract infection)    • Vitamin D deficiency        Past Surgical History:   Procedure Laterality Date   •  SECTION     • CHOLECYSTECTOMY     • COLONOSCOPY     • GUM SURGERY  2017   • HYSTERECTOMY     • KNEE SURGERY         Social History     Socioeconomic History   • Marital status:      Spouse name: Not on file   • Number of children: Not on file   • Years of education: Not on file   • Highest education level: Not on file   Tobacco Use   • Smoking status: Never Smoker   • Smokeless tobacco: Never Used   • Tobacco comment: CAFFEINE USE : MINIMAL, ONLY DRINKS COFFEE IN THE WINTER, ONE CUP   Substance and Sexual Activity   • Alcohol use: No     Drinks per session: 1 or 2     Binge frequency: Weekly   • Drug use: No   Social History  Narrative    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 She is  to [place]. She works out at a [employer] in [place]. She volunteers at the [employer] on Wednesdays. She watches her 3 grandchildren. She has a son in Neurobiology research for [employer].       Family History   Problem Relation Age of Onset   • Stroke Mother    • Hypertension Mother    • Heart disease Father    • Hypertension Sister    • Colon polyps Sister    • Heart disease Brother    • Cancer Paternal Grandmother         breast   • Breast cancer Paternal Grandmother    • Heart disease Paternal Grandfather    • Diabetes Paternal Grandfather        Review of Systems   Constitution: Negative for malaise/fatigue.   Cardiovascular: Positive for palpitations. Negative for chest pain.   Respiratory: Negative for shortness of breath.    Musculoskeletal: Positive for joint pain.   Neurological: Negative for light-headedness.   All other systems reviewed and are negative.      Allergies   Allergen Reactions   • Penicillins Anaphylaxis and Swelling   • Erythromycin Other (See Comments)     JOINT PAIN AND SWELLING         Current Outpatient Medications:   •  aspirin 81 MG chewable tablet, Chew 81 mg daily., Disp: , Rfl:   •  cetirizine (ZyrTEC) 10 MG tablet, Take 10 mg by mouth daily., Disp: , Rfl:   •  fluticasone (FLONASE) 50 MCG/ACT nasal spray, 2 sprays into each nostril daily. Administer 2 sprays in each nostril for each dose., Disp: , Rfl:   •  glucose blood (ACCU-CHEK ACTIVE STRIPS) test strip, Use one test strip daily to check fasting glucose for diabetes monitoring. E11.9, Disp: 100 each, Rfl: 12  •  glucose monitor monitoring kit, Use glucose meter to check fasting glucose once daily for diabetes monitoring. E11.9, Disp: 1 each, Rfl: 0  •  hydrocortisone (ANUSOL-HC) 2.5 % rectal cream, Insert  into the rectum 2 (Two) Times a Day., Disp: 30 g, Rfl: 5  •  Lancets (ACCU-CHEK SOFT TOUCH) lancets, Use one lancet daily to check fasting glucose for  diabetes monitoring. e11.9, Disp: 100 each, Rfl: 12  •  metoprolol tartrate (LOPRESSOR) 25 MG tablet, Take 1 tablet by mouth 2 (Two) Times a Day., Disp: 180 tablet, Rfl: 3  •  Misc Natural Products (OSTEO BI-FLEX ADV TRIPLE ST PO), Take  by mouth., Disp: , Rfl:   •  montelukast (Singulair) 10 MG tablet, Take 1 tablet by mouth Every Night., Disp: 90 tablet, Rfl: 3  •  simvastatin (ZOCOR) 40 MG tablet, TAKE 1 TABLET BY MOUTH EVERY NIGHT., Disp: 90 tablet, Rfl: 3  •  valsartan-hydrochlorothiazide (DIOVAN-HCT) 320-12.5 MG per tablet, TAKE 1 TABLET BY MOUTH DAILY., Disp: 90 tablet, Rfl: 2  •  VENTOLIN  (90 Base) MCG/ACT inhaler, USE 2 PUFFS EVERY 4-6 HOURS AS NEEDED FOR COUGHING ,WHEEZING OR SHORTNESS OF BREATH., Disp: , Rfl: 3  •  vitamin D (ERGOCALCIFEROL) 1.25 MG (42456 UT) capsule capsule, TAKE ONE CAPSULE BY MOUTH EVERY 7 DAYS., Disp: 12 capsule, Rfl: 3     Objective:     There were no vitals filed for this visit.  There is no height or weight on file to calculate BMI.    Physical Exam   Constitutional: She is oriented to person, place, and time. She appears well-developed and well-nourished.   HENT:   Head: Normocephalic.   Nose: Nose normal.   Mouth/Throat: Oropharynx is clear and moist.   Eyes: Pupils are equal, round, and reactive to light. Conjunctivae and EOM are normal.   Neck: Normal range of motion. No JVD present.   Cardiovascular: Regular rhythm, normal heart sounds and intact distal pulses. Bradycardia present.   No murmur heard.  Pulmonary/Chest: Effort normal and breath sounds normal.   Abdominal: Soft. There is no tenderness.   Musculoskeletal: Normal range of motion. She exhibits no edema.   Lymphadenopathy:     She has no cervical adenopathy.   Neurological: She is alert and oriented to person, place, and time. No cranial nerve deficit.   Skin: Skin is warm and dry. No rash noted.   Psychiatric: She has a normal mood and affect. Her behavior is normal. Judgment and thought content normal.    Vitals reviewed.        ECG 12 Lead  Date/Time: 7/7/2020 12:29 PM  Performed by: Keon Mann MD  Authorized by: Keon Mann MD   Comparison: compared with previous ECG   Similar to previous ECG  Rhythm: sinus rhythm  Ectopy comments: One PVC  Conduction: conduction normal  ST Segments: ST segments normal  T Waves: T waves normal  QRS axis: normal  Other: no other findings    Clinical impression: non-specific ECG              Assessment:       Diagnosis Plan   1. Essential hypertension     2. PAF (paroxysmal atrial fibrillation) (CMS/[facility])            Plan:       1.  She had one highly symptomatic AF episode and has had no evidence of recurrence.  She will remain on metoprolol.  If her symptoms recur, then she is to take an extra dose of metoprolol, take a dose of apixaban 5mg (I gave her a box of samples), and to call me immediately.      2.  Her BP is within goal.    Sincerely,       Karen Colin MA

## 2020-07-17 DIAGNOSIS — I10 ESSENTIAL HYPERTENSION: ICD-10-CM

## 2020-07-17 RX ORDER — VALSARTAN AND HYDROCHLOROTHIAZIDE 320; 12.5 MG/1; MG/1
1 TABLET, FILM COATED ORAL DAILY
Qty: 90 TABLET | Refills: 2 | Status: SHIPPED | OUTPATIENT
Start: 2020-07-17 | End: 2020-11-24 | Stop reason: SDUPTHER

## 2020-10-23 ENCOUNTER — TRANSCRIBE ORDERS (OUTPATIENT)
Dept: ADMINISTRATIVE | Facility: HOSPITAL | Age: 70
End: 2020-10-23

## 2020-10-23 DIAGNOSIS — Z12.31 SCREENING MAMMOGRAM, ENCOUNTER FOR: Primary | ICD-10-CM

## 2020-11-17 ENCOUNTER — LAB (OUTPATIENT)
Dept: INTERNAL MEDICINE | Facility: CLINIC | Age: 70
End: 2020-11-17

## 2020-11-17 DIAGNOSIS — I10 ESSENTIAL HYPERTENSION: ICD-10-CM

## 2020-11-17 DIAGNOSIS — E78.2 MIXED HYPERLIPIDEMIA: ICD-10-CM

## 2020-11-17 DIAGNOSIS — R73.01 IFG (IMPAIRED FASTING GLUCOSE): ICD-10-CM

## 2020-11-17 DIAGNOSIS — E55.9 VITAMIN D DEFICIENCY: ICD-10-CM

## 2020-11-18 LAB
1,25(OH)2D3 SERPL-MCNC: 38.8 PG/ML (ref 19.9–79.3)
ALBUMIN SERPL-MCNC: 4.2 G/DL (ref 3.5–5.2)
ALBUMIN/GLOB SERPL: 2.1 G/DL
ALP SERPL-CCNC: 72 U/L (ref 39–117)
ALT SERPL-CCNC: 20 U/L (ref 1–33)
AST SERPL-CCNC: 20 U/L (ref 1–32)
BASOPHILS # BLD AUTO: 0.01 10*3/MM3 (ref 0–0.2)
BASOPHILS NFR BLD AUTO: 0.2 % (ref 0–1.5)
BILIRUB SERPL-MCNC: 0.9 MG/DL (ref 0–1.2)
BUN SERPL-MCNC: 20 MG/DL (ref 8–23)
BUN/CREAT SERPL: 23 (ref 7–25)
CALCIUM SERPL-MCNC: 9.2 MG/DL (ref 8.6–10.5)
CHLORIDE SERPL-SCNC: 103 MMOL/L (ref 98–107)
CHOLEST SERPL-MCNC: 158 MG/DL (ref 0–200)
CO2 SERPL-SCNC: 29.4 MMOL/L (ref 22–29)
CREAT SERPL-MCNC: 0.87 MG/DL (ref 0.57–1)
EOSINOPHIL # BLD AUTO: 0.13 10*3/MM3 (ref 0–0.4)
EOSINOPHIL NFR BLD AUTO: 2.7 % (ref 0.3–6.2)
ERYTHROCYTE [DISTWIDTH] IN BLOOD BY AUTOMATED COUNT: 12.8 % (ref 12.3–15.4)
GLOBULIN SER CALC-MCNC: 2 GM/DL
GLUCOSE SERPL-MCNC: 112 MG/DL (ref 65–99)
HBA1C MFR BLD: 6.6 % (ref 4.8–5.6)
HCT VFR BLD AUTO: 39 % (ref 34–46.6)
HDLC SERPL-MCNC: 54 MG/DL (ref 40–60)
HGB BLD-MCNC: 13.5 G/DL (ref 12–15.9)
IMM GRANULOCYTES # BLD AUTO: 0.01 10*3/MM3 (ref 0–0.05)
IMM GRANULOCYTES NFR BLD AUTO: 0.2 % (ref 0–0.5)
LDLC SERPL CALC-MCNC: 75 MG/DL (ref 0–100)
LYMPHOCYTES # BLD AUTO: 1.29 10*3/MM3 (ref 0.7–3.1)
LYMPHOCYTES NFR BLD AUTO: 27 % (ref 19.6–45.3)
MCH RBC QN AUTO: 31.6 PG (ref 26.6–33)
MCHC RBC AUTO-ENTMCNC: 34.6 G/DL (ref 31.5–35.7)
MCV RBC AUTO: 91.3 FL (ref 79–97)
MONOCYTES # BLD AUTO: 0.73 10*3/MM3 (ref 0.1–0.9)
MONOCYTES NFR BLD AUTO: 15.3 % (ref 5–12)
NEUTROPHILS # BLD AUTO: 2.61 10*3/MM3 (ref 1.7–7)
NEUTROPHILS NFR BLD AUTO: 54.6 % (ref 42.7–76)
NRBC BLD AUTO-RTO: 0 /100 WBC (ref 0–0.2)
PLATELET # BLD AUTO: 217 10*3/MM3 (ref 140–450)
POTASSIUM SERPL-SCNC: 4.2 MMOL/L (ref 3.5–5.2)
PROT SERPL-MCNC: 6.2 G/DL (ref 6–8.5)
RBC # BLD AUTO: 4.27 10*6/MM3 (ref 3.77–5.28)
SODIUM SERPL-SCNC: 139 MMOL/L (ref 136–145)
TRIGL SERPL-MCNC: 170 MG/DL (ref 0–150)
VLDLC SERPL CALC-MCNC: 29 MG/DL (ref 5–40)
WBC # BLD AUTO: 4.78 10*3/MM3 (ref 3.4–10.8)

## 2020-11-19 ENCOUNTER — HOSPITAL ENCOUNTER (OUTPATIENT)
Dept: MAMMOGRAPHY | Facility: HOSPITAL | Age: 70
Discharge: HOME | End: 2020-11-19
Admitting: NURSE PRACTITIONER

## 2020-11-19 DIAGNOSIS — Z12.31 SCREENING MAMMOGRAM, ENCOUNTER FOR: ICD-10-CM

## 2020-11-19 PROCEDURE — 77067 SCR MAMMO BI INCL CAD: CPT

## 2020-11-19 PROCEDURE — 77063 BREAST TOMOSYNTHESIS BI: CPT

## 2020-11-24 ENCOUNTER — OFFICE VISIT (OUTPATIENT)
Dept: INTERNAL MEDICINE | Facility: CLINIC | Age: 70
End: 2020-11-24

## 2020-11-24 VITALS
OXYGEN SATURATION: 98 % | BODY MASS INDEX: 30.91 KG/M2 | HEART RATE: 67 BPM | DIASTOLIC BLOOD PRESSURE: 60 MMHG | WEIGHT: 168 LBS | RESPIRATION RATE: 18 BRPM | HEIGHT: 62 IN | SYSTOLIC BLOOD PRESSURE: 120 MMHG | TEMPERATURE: 97.7 F

## 2020-11-24 DIAGNOSIS — E11.9 CONTROLLED TYPE 2 DIABETES MELLITUS WITHOUT COMPLICATION, WITHOUT LONG-TERM CURRENT USE OF INSULIN (HCC): Primary | ICD-10-CM

## 2020-11-24 DIAGNOSIS — E78.2 MIXED HYPERLIPIDEMIA: ICD-10-CM

## 2020-11-24 DIAGNOSIS — I10 ESSENTIAL HYPERTENSION: ICD-10-CM

## 2020-11-24 DIAGNOSIS — I48.0 PAF (PAROXYSMAL ATRIAL FIBRILLATION) (HCC): ICD-10-CM

## 2020-11-24 PROBLEM — D04.62 SQUAMOUS CELL CARCINOMA IN SITU (SCCIS) OF SKIN OF LEFT UPPER ARM: Status: ACTIVE | Noted: 2020-11-24

## 2020-11-24 LAB
BILIRUB BLD-MCNC: NEGATIVE MG/DL
CLARITY, POC: CLEAR
COLOR UR: YELLOW
GLUCOSE UR STRIP-MCNC: NEGATIVE MG/DL
KETONES UR QL: NEGATIVE
LEUKOCYTE EST, POC: NEGATIVE
NITRITE UR-MCNC: NEGATIVE MG/ML
PH UR: 5 [PH] (ref 5–8)
POC CREATININE URINE: NORMAL
POC MICROALBUMIN URINE: NORMAL
PROT UR STRIP-MCNC: NEGATIVE MG/DL
RBC # UR STRIP: NEGATIVE /UL
SP GR UR: 1.01 (ref 1–1.03)
UROBILINOGEN UR QL: NORMAL

## 2020-11-24 PROCEDURE — 99214 OFFICE O/P EST MOD 30 MIN: CPT | Performed by: NURSE PRACTITIONER

## 2020-11-24 PROCEDURE — 81003 URINALYSIS AUTO W/O SCOPE: CPT | Performed by: NURSE PRACTITIONER

## 2020-11-24 PROCEDURE — 82044 UR ALBUMIN SEMIQUANTITATIVE: CPT | Performed by: NURSE PRACTITIONER

## 2020-11-24 RX ORDER — VALSARTAN AND HYDROCHLOROTHIAZIDE 320; 12.5 MG/1; MG/1
1 TABLET, FILM COATED ORAL DAILY
Qty: 90 TABLET | Refills: 3 | Status: SHIPPED | OUTPATIENT
Start: 2020-11-24 | End: 2021-11-02

## 2020-11-24 RX ORDER — BLOOD-GLUCOSE METER
KIT MISCELLANEOUS
Qty: 1 EACH | Refills: 0 | Status: SHIPPED | OUTPATIENT
Start: 2020-11-24

## 2020-11-24 RX ORDER — SIMVASTATIN 40 MG
40 TABLET ORAL NIGHTLY
Qty: 90 TABLET | Refills: 3 | Status: SHIPPED | OUTPATIENT
Start: 2020-11-24 | End: 2021-09-20

## 2020-11-24 NOTE — PROGRESS NOTES
Chief Complaint   Patient presents with   • Hyperlipidemia   • Hypertension       Subjective     Debbie Cheng is a 70 y.o. female being seen for a follow up appointment today regarding  Dm 2, HTN, Hyperlipidemia, PAF, and IFG. She is currently takign Diovan 320/12.5mg daily, Lopressor 25mg BID for HTN and Heart rate control. She has history of PAF, and is followed by Dr. Mann (cardio)., last visit 7-7-2020 with non-specific ECG. She has been walking with her neighbor 2 miles a day. BP 120s/80s at home. She denies CP, palpitations, chest pains.     She is on Zocor 40mg nightly for cholesterol and tolerates this well. No myalgias.     She has history of IFG, and she recently completed a 14 day course of prednisone from dermatology. She admits to poor diet and baking home made bread. She denies blurred vision since cataract surgery, denies dry mouth. She does have urinary frequency.      History of Present Illness     Allergies   Allergen Reactions   • Penicillins Anaphylaxis and Swelling   • Erythromycin Other (See Comments)     JOINT PAIN AND SWELLING         Current Outpatient Medications:   •  aspirin 81 MG chewable tablet, Chew 81 mg daily., Disp: , Rfl:   •  cetirizine (ZyrTEC) 10 MG tablet, Take 10 mg by mouth daily., Disp: , Rfl:   •  fluticasone (FLONASE) 50 MCG/ACT nasal spray, 2 sprays into each nostril daily. Administer 2 sprays in each nostril for each dose., Disp: , Rfl:   •  glucose blood (ACCU-CHEK ACTIVE STRIPS) test strip, Use one test strip daily to check fasting glucose for diabetes monitoring. E11.9, Disp: 100 each, Rfl: 12  •  glucose monitor monitoring kit, Use glucose meter to check fasting glucose once daily for diabetes monitoring. E11.9, Disp: 1 each, Rfl: 0  •  hydrocortisone (ANUSOL-HC) 2.5 % rectal cream, Insert  into the rectum 2 (Two) Times a Day., Disp: 30 g, Rfl: 5  •  Lancets (ACCU-CHEK SOFT TOUCH) lancets, Use one lancet daily to check fasting glucose for diabetes monitoring. e11.9,  Disp: 100 each, Rfl: 12  •  metoprolol tartrate (LOPRESSOR) 25 MG tablet, Take 1 tablet by mouth 2 (Two) Times a Day., Disp: 180 tablet, Rfl: 3  •  Misc Natural Products (OSTEO BI-FLEX ADV TRIPLE ST PO), Take  by mouth., Disp: , Rfl:   •  montelukast (Singulair) 10 MG tablet, Take 1 tablet by mouth Every Night., Disp: 90 tablet, Rfl: 3  •  simvastatin (ZOCOR) 40 MG tablet, TAKE 1 TABLET BY MOUTH EVERY NIGHT., Disp: 90 tablet, Rfl: 3  •  valsartan-hydrochlorothiazide (DIOVAN-HCT) 320-12.5 MG per tablet, TAKE 1 TABLET BY MOUTH DAILY., Disp: 90 tablet, Rfl: 2  •  VENTOLIN  (90 Base) MCG/ACT inhaler, USE 2 PUFFS EVERY 4-6 HOURS AS NEEDED FOR COUGHING ,WHEEZING OR SHORTNESS OF BREATH., Disp: , Rfl: 3  •  vitamin D (ERGOCALCIFEROL) 1.25 MG (87759 UT) capsule capsule, TAKE ONE CAPSULE BY MOUTH EVERY 7 DAYS., Disp: 12 capsule, Rfl: 3    The following portions of the patient's history were reviewed and updated as appropriate: allergies, current medications, past family history, past medical history, past social history, past surgical history and problem list.    Review of Systems   Constitutional: Negative.    HENT: Negative.    Eyes: Negative.  Negative for photophobia and visual disturbance.   Respiratory: Negative.  Negative for shortness of breath, wheezing and stridor.    Cardiovascular: Negative for chest pain, palpitations and leg swelling.   Gastrointestinal: Negative.    Endocrine: Negative.    Genitourinary: Negative.    Musculoskeletal: Negative.    Skin: Negative.    Allergic/Immunologic: Negative.  Negative for environmental allergies, food allergies and immunocompromised state.   Neurological: Negative.    Hematological: Negative.  Negative for adenopathy. Does not bruise/bleed easily.   Psychiatric/Behavioral: Negative.    All other systems reviewed and are negative.      Assessment     Physical Exam  Vitals signs reviewed.   Constitutional:       Appearance: Normal appearance. She is obese. She is not  ill-appearing.   HENT:      Head: Normocephalic.      Right Ear: Tympanic membrane normal.      Left Ear: Tympanic membrane normal.   Neck:      Musculoskeletal: Neck supple.   Cardiovascular:      Rate and Rhythm: Normal rate and regular rhythm.      Pulses: Normal pulses.      Heart sounds: Normal heart sounds. No murmur.   Pulmonary:      Effort: Pulmonary effort is normal. No respiratory distress.      Breath sounds: Normal breath sounds. No stridor. No wheezing or rhonchi.   Musculoskeletal:         General: No swelling.   Skin:     Capillary Refill: Capillary refill takes less than 2 seconds.   Neurological:      General: No focal deficit present.      Mental Status: She is alert.   Psychiatric:         Mood and Affect: Mood normal.         Thought Content: Thought content normal.         Plan     Her fasting labs were reviewed with the patient from last week.      Diagnosis Plan   1. Controlled type 2 diabetes mellitus without complication, without long-term current use of insulin (CMS/[facility])  glucose monitor monitoring kit    glucose blood (ACCU-CHEK ACTIVE STRIPS) test strip    Lancets (accu-chek soft touch) lancets    Comprehensive metabolic panel    Lipid panel    Hemoglobin A1c   2. Essential hypertension  Comprehensive metabolic panel    Lipid panel    valsartan-hydrochlorothiazide (DIOVAN-HCT) 320-12.5 MG per tablet   3. Mixed hyperlipidemia  Comprehensive metabolic panel    Lipid panel    simvastatin (ZOCOR) 40 MG tablet   4. PAF (paroxysmal atrial fibrillation) (CMS/[facility])       IVET SERRANO completed today.    New onset DM 2. Start a fasting glucose log, goal < 120. Start a PP glucose check, 1-2 hours after meals. New meter sent to pharmacy. Reduce carbs in diet to 50 grams per meal.     Follow up in 3 months with glucose log

## 2020-11-24 NOTE — PATIENT INSTRUCTIONS
Blood Glucose Monitoring, Adult    If fasting glucose < 120, start monitoring 1-2 hours after meals.    Monitoring your blood sugar (glucose) is an important part of managing your diabetes (diabetes mellitus). Blood glucose monitoring involves checking your blood glucose as often as directed and keeping a record (log) of your results over time.  Checking your blood glucose regularly and keeping a blood glucose log can:  · Help you and your health care provider adjust your diabetes management plan as needed, including your medicines or insulin.  · Help you understand how food, exercise, illnesses, and medicines affect your blood glucose.  · Let you know what your blood glucose is at any time. You can quickly find out if you have low blood glucose (hypoglycemia) or high blood glucose (hyperglycemia).  Your health care provider will set individualized treatment goals for you. Your goals will be based on your age, other medical conditions you have, and how you respond to diabetes treatment. Generally, the goal of treatment is to maintain the following blood glucose levels:  · Before meals (preprandial):  mg/dL (4.4-7.2 mmol/L).  · After meals (postprandial): below 180 mg/dL (10 mmol/L).  · A1c level: less than 7%.  Supplies needed:  · Blood glucose meter.  · Test strips for your meter. Each meter has its own strips. You must use the strips that came with your meter.  · A needle to prick your finger (lancet). Do not use a lancet more than one time.  · A device that holds the lancet (lancing device).  · A journal or log book to write down your results.  How to check your blood glucose    1. Wash your hands with soap and water.  2. Prick the side of your finger (not the tip) with the lancet. Use a different finger each time.  3. Gently rub the finger until a small drop of blood appears.  4. Follow instructions that come with your meter for inserting the test strip, applying blood to the strip, and using your blood  glucose meter.  5. Write down your result and any notes.  Some meters allow you to use areas of your body other than your finger (alternative sites) to test your blood. The most common alternative sites are:  · Forearm.  · Thigh.  · Palm of the hand.  If you think you may have hypoglycemia, or if you have a history of not knowing when your blood glucose is getting low (hypoglycemia unawareness), do not use alternative sites. Use your finger instead. Alternative sites may not be as accurate as the fingers, because blood flow is slower in these areas. This means that the result you get may be delayed, and it may be different from the result that you would get from your finger.  Follow these instructions at home:  Blood glucose log    · Every time you check your blood glucose, write down your result. Also write down any notes about things that may be affecting your blood glucose, such as your diet and exercise for the day. This information can help you and your health care provider:  ? Look for patterns in your blood glucose over time.  ? Adjust your diabetes management plan as needed.  · Check if your meter allows you to download your records to a computer. Most glucose meters store a record of glucose readings in the meter.  If you have type 1 diabetes:  · Check your blood glucose 2 or more times a day.  · Also check your blood glucose:  ? Before every insulin injection.  ? Before and after exercise.  ? Before meals.  ? 2 hours after a meal.  ? Occasionally between 2:00 a.m. and 3:00 a.m., as directed.  ? Before potentially dangerous tasks, like driving or using heavy machinery.  ? At bedtime.  · You may need to check your blood glucose more often, up to 6-10 times a day, if you:  ? Use an insulin pump.  ? Need multiple daily injections (MDI).  ? Have diabetes that is not well-controlled.  ? Are ill.  ? Have a history of severe hypoglycemia.  ? Have hypoglycemia unawareness.  If you have type 2 diabetes:  · If you  "take insulin or other diabetes medicines, check your blood glucose 2 or more times a day.  · If you are on intensive insulin therapy, check your blood glucose 4 or more times a day. Occasionally, you may also need to check between 2:00 a.m. and 3:00 a.m., as directed.  · Also check your blood glucose:  ? Before and after exercise.  ? Before potentially dangerous tasks, like driving or using heavy machinery.  · You may need to check your blood glucose more often if:  ? Your medicine is being adjusted.  ? Your diabetes is not well-controlled.  ? You are ill.  General tips  · Always keep your supplies with you.  · If you have questions or need help, all blood glucose meters have a 24-hour \"hotline\" phone number that you can call. You may also contact your health care provider.  · After you use a few boxes of test strips, adjust (calibrate) your blood glucose meter by following instructions that came with your meter.  Contact a health care provider if:  · Your blood glucose is at or above 240 mg/dL (13.3 mmol/L) for 2 days in a row.  · You have been sick or have had a fever for 2 days or longer, and you are not getting better.  · You have any of the following problems for more than 6 hours:  ? You cannot eat or drink.  ? You have nausea or vomiting.  ? You have diarrhea.  Get help right away if:  · Your blood glucose is lower than 54 mg/dL (3 mmol/L).  · You become confused or you have trouble thinking clearly.  · You have difficulty breathing.  · You have moderate or large ketone levels in your urine.  Summary  · Monitoring your blood sugar (glucose) is an important part of managing your diabetes (diabetes mellitus).  · Blood glucose monitoring involves checking your blood glucose as often as directed and keeping a record (log) of your results over time.  · Your health care provider will set individualized treatment goals for you. Your goals will be based on your age, other medical conditions you have, and how you " respond to diabetes treatment.  · Every time you check your blood glucose, write down your result. Also write down any notes about things that may be affecting your blood glucose, such as your diet and exercise for the day.  This information is not intended to replace advice given to you by your health care provider. Make sure you discuss any questions you have with your health care provider.  Document Revised: 10/11/2019 Document Reviewed: 05/29/2017  Elsevier Patient Education © 2020 [employer]  Diabetes Mellitus and Exercise  Exercising regularly is important for your overall health, especially when you have diabetes (diabetes mellitus). Exercising is not only about losing weight. It has many other health benefits, such as increasing muscle strength and bone density and reducing body fat and stress. This leads to improved fitness, flexibility, and endurance, all of which result in better overall health.  Exercise has additional benefits for people with diabetes, including:  · Reducing appetite.  · Helping to lower and control blood glucose.  · Lowering blood pressure.  · Helping to control amounts of fatty substances (lipids) in the blood, such as cholesterol and triglycerides.  · Helping the body to respond better to insulin (improving insulin sensitivity).  · Reducing how much insulin the body needs.  · Decreasing the risk for heart disease by:  ? Lowering cholesterol and triglyceride levels.  ? Increasing the levels of good cholesterol.  ? Lowering blood glucose levels.  What is my activity plan?  Your health care provider or certified diabetes educator can help you make a plan for the type and frequency of exercise (activity plan) that works for you. Make sure that you:  · Do at least 150 minutes of moderate-intensity or vigorous-intensity exercise each week. This could be brisk walking, biking, or water aerobics.  ? Do stretching and strength exercises, such as yoga or weightlifting, at least 2 times a  week.  ? Spread out your activity over at least 3 days of the week.  · Get some form of physical activity every day.  ? Do not go more than 2 days in a row without some kind of physical activity.  ? Avoid being inactive for more than 30 minutes at a time. Take frequent breaks to walk or stretch.  · Choose a type of exercise or activity that you enjoy, and set realistic goals.  · Start slowly, and gradually increase the intensity of your exercise over time.  What do I need to know about managing my diabetes?    · Check your blood glucose before and after exercising.  ? If your blood glucose is 240 mg/dL (13.3 mmol/L) or higher before you exercise, check your urine for ketones. If you have ketones in your urine, do not exercise until your blood glucose returns to normal.  ? If your blood glucose is 100 mg/dL (5.6 mmol/L) or lower, eat a snack containing 15-20 grams of carbohydrate. Check your blood glucose 15 minutes after the snack to make sure that your level is above 100 mg/dL (5.6 mmol/L) before you start your exercise.  · Know the symptoms of low blood glucose (hypoglycemia) and how to treat it. Your risk for hypoglycemia increases during and after exercise. Common symptoms of hypoglycemia can include:  ? Hunger.  ? Anxiety.  ? Sweating and feeling clammy.  ? Confusion.  ? Dizziness or feeling light-headed.  ? Increased heart rate or palpitations.  ? Blurry vision.  ? Tingling or numbness around the mouth, lips, or tongue.  ? Tremors or shakes.  ? Irritability.  · Keep a rapid-acting carbohydrate snack available before, during, and after exercise to help prevent or treat hypoglycemia.  · Avoid injecting insulin into areas of the body that are going to be exercised. For example, avoid injecting insulin into:  ? The arms, when playing tennis.  ? The legs, when jogging.  · Keep records of your exercise habits. Doing this can help you and your health care provider adjust your diabetes management plan as needed. Write  down:  ? Food that you eat before and after you exercise.  ? Blood glucose levels before and after you exercise.  ? The type and amount of exercise you have done.  ? When your insulin is expected to peak, if you use insulin. Avoid exercising at times when your insulin is peaking.  · When you start a new exercise or activity, work with your health care provider to make sure the activity is safe for you, and to adjust your insulin, medicines, or food intake as needed.  · Drink plenty of water while you exercise to prevent dehydration or heat stroke. Drink enough fluid to keep your urine clear or pale yellow.  Summary  · Exercising regularly is important for your overall health, especially when you have diabetes (diabetes mellitus).  · Exercising has many health benefits, such as increasing muscle strength and bone density and reducing body fat and stress.  · Your health care provider or certified diabetes educator can help you make a plan for the type and frequency of exercise (activity plan) that works for you.  · When you start a new exercise or activity, work with your health care provider to make sure the activity is safe for you, and to adjust your insulin, medicines, or food intake as needed.  This information is not intended to replace advice given to you by your health care provider. Make sure you discuss any questions you have with your health care provider.  Document Revised: 07/12/2018 Document Reviewed: 05/29/2017  Elsevier Patient Education © 2020 [employer]

## 2020-11-29 ENCOUNTER — RESULTS ENCOUNTER (OUTPATIENT)
Dept: INTERNAL MEDICINE | Facility: CLINIC | Age: 70
End: 2020-11-29

## 2020-11-29 DIAGNOSIS — I10 ESSENTIAL HYPERTENSION: ICD-10-CM

## 2020-11-29 DIAGNOSIS — E11.9 CONTROLLED TYPE 2 DIABETES MELLITUS WITHOUT COMPLICATION, WITHOUT LONG-TERM CURRENT USE OF INSULIN (HCC): ICD-10-CM

## 2020-11-29 DIAGNOSIS — E78.2 MIXED HYPERLIPIDEMIA: ICD-10-CM

## 2021-02-24 LAB
ALBUMIN SERPL-MCNC: 4 G/DL (ref 3.5–5.2)
ALBUMIN/GLOB SERPL: 1.7 G/DL
ALP SERPL-CCNC: 68 U/L (ref 39–117)
ALT SERPL-CCNC: 20 U/L (ref 1–33)
AST SERPL-CCNC: 23 U/L (ref 1–32)
BASOPHILS # BLD AUTO: 0.03 10*3/MM3 (ref 0–0.2)
BASOPHILS NFR BLD AUTO: 0.6 % (ref 0–1.5)
BILIRUB SERPL-MCNC: 0.8 MG/DL (ref 0–1.2)
BUN SERPL-MCNC: 23 MG/DL (ref 8–23)
BUN/CREAT SERPL: 28.4 (ref 7–25)
CALCIUM SERPL-MCNC: 9.3 MG/DL (ref 8.6–10.5)
CHLORIDE SERPL-SCNC: 104 MMOL/L (ref 98–107)
CHOLEST SERPL-MCNC: 160 MG/DL (ref 0–200)
CO2 SERPL-SCNC: 25.2 MMOL/L (ref 22–29)
CREAT SERPL-MCNC: 0.81 MG/DL (ref 0.57–1)
EOSINOPHIL # BLD AUTO: 0.09 10*3/MM3 (ref 0–0.4)
EOSINOPHIL NFR BLD AUTO: 1.8 % (ref 0.3–6.2)
ERYTHROCYTE [DISTWIDTH] IN BLOOD BY AUTOMATED COUNT: 12.8 % (ref 12.3–15.4)
GLOBULIN SER CALC-MCNC: 2.4 GM/DL
GLUCOSE SERPL-MCNC: 118 MG/DL (ref 65–99)
HBA1C MFR BLD: 6.3 % (ref 4.8–5.6)
HCT VFR BLD AUTO: 38.2 % (ref 34–46.6)
HDLC SERPL-MCNC: 51 MG/DL (ref 40–60)
HGB BLD-MCNC: 13 G/DL (ref 12–15.9)
IMM GRANULOCYTES # BLD AUTO: 0.01 10*3/MM3 (ref 0–0.05)
IMM GRANULOCYTES NFR BLD AUTO: 0.2 % (ref 0–0.5)
LDLC SERPL CALC-MCNC: 80 MG/DL (ref 0–100)
LYMPHOCYTES # BLD AUTO: 1.4 10*3/MM3 (ref 0.7–3.1)
LYMPHOCYTES NFR BLD AUTO: 28.7 % (ref 19.6–45.3)
MCH RBC QN AUTO: 30.3 PG (ref 26.6–33)
MCHC RBC AUTO-ENTMCNC: 34 G/DL (ref 31.5–35.7)
MCV RBC AUTO: 89 FL (ref 79–97)
MONOCYTES # BLD AUTO: 0.82 10*3/MM3 (ref 0.1–0.9)
MONOCYTES NFR BLD AUTO: 16.8 % (ref 5–12)
NEUTROPHILS # BLD AUTO: 2.52 10*3/MM3 (ref 1.7–7)
NEUTROPHILS NFR BLD AUTO: 51.9 % (ref 42.7–76)
NRBC BLD AUTO-RTO: 0 /100 WBC (ref 0–0.2)
PLATELET # BLD AUTO: 240 10*3/MM3 (ref 140–450)
POTASSIUM SERPL-SCNC: 4.2 MMOL/L (ref 3.5–5.2)
PROT SERPL-MCNC: 6.4 G/DL (ref 6–8.5)
RBC # BLD AUTO: 4.29 10*6/MM3 (ref 3.77–5.28)
SODIUM SERPL-SCNC: 139 MMOL/L (ref 136–145)
TRIGL SERPL-MCNC: 171 MG/DL (ref 0–150)
VLDLC SERPL CALC-MCNC: 29 MG/DL (ref 5–40)
WBC # BLD AUTO: 4.87 10*3/MM3 (ref 3.4–10.8)

## 2021-02-25 DIAGNOSIS — E55.9 VITAMIN D DEFICIENCY: ICD-10-CM

## 2021-02-25 RX ORDER — ERGOCALCIFEROL 1.25 MG/1
CAPSULE ORAL
Qty: 12 CAPSULE | Refills: 3 | Status: SHIPPED | OUTPATIENT
Start: 2021-02-25 | End: 2022-01-28

## 2021-02-26 ENCOUNTER — OFFICE VISIT (OUTPATIENT)
Dept: INTERNAL MEDICINE | Facility: CLINIC | Age: 71
End: 2021-02-26

## 2021-02-26 VITALS
HEIGHT: 62 IN | BODY MASS INDEX: 31.17 KG/M2 | TEMPERATURE: 97.2 F | DIASTOLIC BLOOD PRESSURE: 72 MMHG | OXYGEN SATURATION: 94 % | SYSTOLIC BLOOD PRESSURE: 118 MMHG | HEART RATE: 59 BPM | RESPIRATION RATE: 18 BRPM | WEIGHT: 169.4 LBS

## 2021-02-26 DIAGNOSIS — E11.9 CONTROLLED TYPE 2 DIABETES MELLITUS WITHOUT COMPLICATION, WITHOUT LONG-TERM CURRENT USE OF INSULIN (HCC): Primary | ICD-10-CM

## 2021-02-26 DIAGNOSIS — E78.2 MIXED HYPERLIPIDEMIA: ICD-10-CM

## 2021-02-26 DIAGNOSIS — I48.0 PAF (PAROXYSMAL ATRIAL FIBRILLATION) (HCC): ICD-10-CM

## 2021-02-26 DIAGNOSIS — I10 ESSENTIAL HYPERTENSION: ICD-10-CM

## 2021-02-26 PROCEDURE — 99214 OFFICE O/P EST MOD 30 MIN: CPT | Performed by: NURSE PRACTITIONER

## 2021-02-26 NOTE — PROGRESS NOTES
"Chief Complaint   Patient presents with   • Controlled type 2 diabetes mellitus without complication, wi       Subjective     Debbie Cheng is a 70 y.o. female being seen for a follow up appointment today regarding DM 2, PAF, HTN, Hyperlipidemia. Her PP glucose runs 118-150s. She is watching her carbs. Her BP at hoome runs 120s/70s.  She is walking 1 mile a day and getting 10, 0000 steps a day.  She is followed by Dr. Mann, last visit 7-7-2021. She is on Lopresor and Aspirin therapy for PAF. She denies any atrial fib or sustained heart palpitations. She does feel \"PVCs a few times a week\" brought on my anxiety. HR on her fitness track runs in the 60s. Denies CP, SOA, edema.       History of Present Illness     Allergies   Allergen Reactions   • Penicillins Anaphylaxis and Swelling   • Erythromycin Other (See Comments)     JOINT PAIN AND SWELLING         Current Outpatient Medications:   •  aspirin 81 MG chewable tablet, Chew 81 mg daily., Disp: , Rfl:   •  fluticasone (FLONASE) 50 MCG/ACT nasal spray, 2 sprays into each nostril daily. Administer 2 sprays in each nostril for each dose., Disp: , Rfl:   •  glucose blood (ACCU-CHEK ACTIVE STRIPS) test strip, Use one test strip daily to check fasting glucose for diabetes monitoring. E11.9, Disp: 100 each, Rfl: 12  •  glucose monitor monitoring kit, Use glucose meter to check fasting glucose once daily for diabetes monitoring. E11.9, Disp: 1 each, Rfl: 0  •  hydrocortisone (ANUSOL-HC) 2.5 % rectal cream, Insert  into the rectum 2 (Two) Times a Day., Disp: 30 g, Rfl: 5  •  Lancets (accu-chek soft touch) lancets, Use one lancet daily to check fasting glucose for diabetes monitoring. e11.9, Disp: 100 each, Rfl: 12  •  metoprolol tartrate (LOPRESSOR) 25 MG tablet, Take 1 tablet by mouth 2 (Two) Times a Day., Disp: 180 tablet, Rfl: 3  •  Misc Natural Products (OSTEO BI-FLEX ADV TRIPLE ST PO), Take  by mouth., Disp: , Rfl:   •  montelukast (Singulair) 10 MG tablet, Take 1 tablet " by mouth Every Night., Disp: 90 tablet, Rfl: 3  •  simvastatin (ZOCOR) 40 MG tablet, Take 1 tablet by mouth Every Night., Disp: 90 tablet, Rfl: 3  •  valsartan-hydrochlorothiazide (DIOVAN-HCT) 320-12.5 MG per tablet, Take 1 tablet by mouth Daily., Disp: 90 tablet, Rfl: 3  •  VENTOLIN  (90 Base) MCG/ACT inhaler, USE 2 PUFFS EVERY 4-6 HOURS AS NEEDED FOR COUGHING ,WHEEZING OR SHORTNESS OF BREATH., Disp: , Rfl: 3  •  vitamin D (ERGOCALCIFEROL) 1.25 MG (91509 UT) capsule capsule, TAKE 1 CAPSULE BY MOUTH EVERY 7 DAYS, Disp: 12 capsule, Rfl: 3    The following portions of the patient's history were reviewed and updated as appropriate: allergies, current medications, past family history, past medical history, past social history, past surgical history and problem list.    Review of Systems   Constitutional: Negative.    HENT: Negative.    Eyes: Negative.    Respiratory: Negative.  Negative for shortness of breath, wheezing and stridor.    Cardiovascular: Negative.  Negative for chest pain, palpitations and leg swelling.   Gastrointestinal: Negative.    Endocrine: Negative.    Genitourinary: Negative.    Musculoskeletal: Negative.    Skin: Negative.    Allergic/Immunologic: Negative.  Negative for environmental allergies, food allergies and immunocompromised state.   Neurological: Negative.    Hematological: Negative.    Psychiatric/Behavioral: Negative.    All other systems reviewed and are negative.      Assessment     Physical Exam  Vitals signs reviewed.   Constitutional:       Appearance: Normal appearance. She is not ill-appearing.   HENT:      Right Ear: Tympanic membrane normal.      Left Ear: Tympanic membrane normal.   Neck:      Musculoskeletal: Neck supple.   Cardiovascular:      Rate and Rhythm: Normal rate and regular rhythm.      Pulses: Normal pulses.      Heart sounds: Normal heart sounds. No murmur.   Pulmonary:      Effort: Pulmonary effort is normal.      Breath sounds: Normal breath sounds.    Abdominal:      General: Abdomen is flat.   Musculoskeletal:         General: No swelling.   Skin:     General: Skin is warm and dry.   Neurological:      General: No focal deficit present.      Mental Status: She is alert and oriented to person, place, and time.   Psychiatric:         Mood and Affect: Mood normal.         Behavior: Behavior normal.         Thought Content: Thought content normal.         Plan     Her fasting labs were reviewed with the patient from last week.      Diagnosis Plan   1. Controlled type 2 diabetes mellitus without complication, without long-term current use of insulin (CMS/[facility])      Hgb A1C 6.3%   2. Essential hypertension      BP at goal < 135/85   3. PAF (paroxysmal atrial fibrillation) (CMS/[facility])      No episodes of Atrial fib   4. Mixed hyperlipidemia      LDL at goal         Continue current medications    Follow up in 6 months with labs

## 2021-05-19 DIAGNOSIS — L30.9 ECZEMA, UNSPECIFIED TYPE: ICD-10-CM

## 2021-05-20 RX ORDER — MONTELUKAST SODIUM 10 MG/1
TABLET ORAL
Qty: 90 TABLET | Refills: 3 | Status: SHIPPED | OUTPATIENT
Start: 2021-05-20

## 2021-07-20 ENCOUNTER — OFFICE VISIT (OUTPATIENT)
Dept: CARDIOLOGY | Facility: CLINIC | Age: 71
End: 2021-07-20

## 2021-07-20 VITALS
HEIGHT: 62 IN | WEIGHT: 169 LBS | DIASTOLIC BLOOD PRESSURE: 80 MMHG | BODY MASS INDEX: 31.1 KG/M2 | OXYGEN SATURATION: 97 % | RESPIRATION RATE: 16 BRPM | HEART RATE: 56 BPM | SYSTOLIC BLOOD PRESSURE: 136 MMHG

## 2021-07-20 DIAGNOSIS — E78.2 MIXED HYPERLIPIDEMIA: ICD-10-CM

## 2021-07-20 DIAGNOSIS — I10 ESSENTIAL HYPERTENSION: ICD-10-CM

## 2021-07-20 DIAGNOSIS — I48.0 PAF (PAROXYSMAL ATRIAL FIBRILLATION) (HCC): Primary | ICD-10-CM

## 2021-07-20 PROCEDURE — 99214 OFFICE O/P EST MOD 30 MIN: CPT | Performed by: NURSE PRACTITIONER

## 2021-07-20 PROCEDURE — 93000 ELECTROCARDIOGRAM COMPLETE: CPT | Performed by: NURSE PRACTITIONER

## 2021-07-20 NOTE — PROGRESS NOTES
Date of Office Visit: 2021  Encounter Provider: LOUIS Elmore  Place of Service: [facility] CARDIOLOGY  Patient Name: Debbie Cheng  :1950  Primary Cardiologist: Dr. Mann    CC:  Annual cardiac evaluation    Dear Terra    HPI: Debbie Cheng is a pleasant 70 y.o. female who presents 2021 for cardiac follow up. She is a new patient to me and I have reviewed her past medical records.  She is a patient of Dr. Mann.    She is an extremely pleasant lady who had one episode of highly symptomatic atrial fibrillation in May 2015. She converted on her own. She was started on rivaroxaban and metoprolol. In 2015,  the NOAC was stopped when it was clear that she had experienced no recurrence.      She had labs performed 2021.  CMP unremarkable except for mildly elevated blood glucose 118.  Lipid panel revealed total cholesterol 160, triglycerides 171, HDL 51 and LDL 80.  A1c 6.30 with an unremarkable CBC.    She states she has been noticing more palpitations especially at night.  She states that they were recently on vacation and they did a lot of walking and she noticed more shortness of breath with exertion mostly going up the hill he [place].  She denies any lower extremity edema, chest pain or chest pressure.  She denies fatigue.  She will occasionally have some dizziness with change in position.  She is taking her medications as directed.       Past Medical History:   Diagnosis Date   • Acute bacterial conjunctivitis of left eye 10/20/2016   • Allergic rhinitis    • Asthma    • Bronchitis    • Colon polyp    • Hyperlipidemia    • Hypertension    • PAF (paroxysmal atrial fibrillation) (CMS/HCC)     one episode, 2015; was briefly treated with Xarelto   • Pneumonia    • Rotator cuff tendonitis    • Type 2 diabetes mellitus (CMS/[facility])    • UTI (urinary tract infection)    • Vitamin D deficiency        Past Surgical History:   Procedure Laterality Date   •   SECTION     • CHOLECYSTECTOMY     • COLONOSCOPY     • EYE SURGERY Bilateral     cataract surgery   • GUM SURGERY  2017   • HYSTERECTOMY     • KNEE SURGERY         Social History     Socioeconomic History   • Marital status:      Spouse name: Not on file   • Number of children: Not on file   • Years of education: Not on file   • Highest education level: Not on file   Tobacco Use   • Smoking status: Never Smoker   • Smokeless tobacco: Never Used   • Tobacco comment: CAFFEINE USE : MINIMAL, ONLY DRINKS COFFEE IN THE WINTER, ONE CUP   Vaping Use   • Vaping Use: Never used   Substance and Sexual Activity   • Alcohol use: No   • Drug use: No   • Sexual activity: Not Currently       Family History   Problem Relation Age of Onset   • Stroke Mother    • Hypertension Mother    • Heart disease Father    • Hypertension Sister    • Colon polyps Sister    • Heart disease Brother    • Cancer Paternal Grandmother         breast   • Breast cancer Paternal Grandmother    • Heart disease Paternal Grandfather    • Diabetes Paternal Grandfather        The following portion of the patient's history were reviewed and updated as appropriate: past medical history, past surgical history, past social history, past family history, allergies, current medications, and problem list.    Review of Systems   Constitutional: Negative for diaphoresis, fever and malaise/fatigue.   HENT: Negative for congestion, hearing loss, hoarse voice, nosebleeds and sore throat.    Eyes: Negative for photophobia, vision loss in left eye, vision loss in right eye and visual disturbance.   Cardiovascular: Positive for palpitations. Negative for chest pain, dyspnea on exertion, irregular heartbeat, leg swelling, near-syncope, orthopnea, paroxysmal nocturnal dyspnea and syncope.   Respiratory: Positive for shortness of breath (with exertion). Negative for cough, hemoptysis, sleep disturbances due to breathing, snoring, sputum production and wheezing.     Endocrine: Negative for cold intolerance, heat intolerance, polydipsia, polyphagia and polyuria.   Hematologic/Lymphatic: Negative for bleeding problem. Does not bruise/bleed easily.   Skin: Negative for color change, dry skin, poor wound healing, rash and suspicious lesions.   Musculoskeletal: Negative for arthritis, back pain, falls, gout, joint pain, joint swelling, muscle cramps, muscle weakness and myalgias.   Gastrointestinal: Negative for bloating, abdominal pain, constipation, diarrhea, dysphagia, melena, nausea and vomiting.   Neurological: Positive for dizziness (with changes in positon). Negative for excessive daytime sleepiness, headaches, light-headedness, loss of balance, numbness, paresthesias, seizures, vertigo and weakness.   Psychiatric/Behavioral: Negative for depression, memory loss and substance abuse. The patient is not nervous/anxious.        Allergies   Allergen Reactions   • Penicillins Anaphylaxis and Swelling   • Erythromycin Other (See Comments)     JOINT PAIN AND SWELLING         Current Outpatient Medications:   •  aspirin 81 MG chewable tablet, Chew 81 mg daily., Disp: , Rfl:   •  fluticasone (FLONASE) 50 MCG/ACT nasal spray, 2 sprays into each nostril daily. Administer 2 sprays in each nostril for each dose., Disp: , Rfl:   •  glucose blood (ACCU-CHEK ACTIVE STRIPS) test strip, Use one test strip daily to check fasting glucose for diabetes monitoring. E11.9, Disp: 100 each, Rfl: 12  •  glucose monitor monitoring kit, Use glucose meter to check fasting glucose once daily for diabetes monitoring. E11.9, Disp: 1 each, Rfl: 0  •  hydrocortisone (ANUSOL-HC) 2.5 % rectal cream, Insert  into the rectum 2 (Two) Times a Day., Disp: 30 g, Rfl: 5  •  Lancets (accu-chek soft touch) lancets, Use one lancet daily to check fasting glucose for diabetes monitoring. e11.9, Disp: 100 each, Rfl: 12  •  metoprolol tartrate (LOPRESSOR) 25 MG tablet, Take 1 tablet by mouth 2 (Two) Times a Day. (Patient  "taking differently: Take 12.5 mg by mouth 2 (Two) Times a Day.), Disp: 180 tablet, Rfl: 3  •  Misc Natural Products (OSTEO BI-FLEX ADV TRIPLE ST PO), Take  by mouth., Disp: , Rfl:   •  montelukast (SINGULAIR) 10 MG tablet, TAKE 1 TABLET BY MOUTH EVERY DAY AT NIGHT, Disp: 90 tablet, Rfl: 3  •  simvastatin (ZOCOR) 40 MG tablet, Take 1 tablet by mouth Every Night., Disp: 90 tablet, Rfl: 3  •  valsartan-hydrochlorothiazide (DIOVAN-HCT) 320-12.5 MG per tablet, Take 1 tablet by mouth Daily., Disp: 90 tablet, Rfl: 3  •  VENTOLIN  (90 Base) MCG/ACT inhaler, USE 2 PUFFS EVERY 4-6 HOURS AS NEEDED FOR COUGHING ,WHEEZING OR SHORTNESS OF BREATH., Disp: , Rfl: 3  •  vitamin D (ERGOCALCIFEROL) 1.25 MG (58670 UT) capsule capsule, TAKE 1 CAPSULE BY MOUTH EVERY 7 DAYS, Disp: 12 capsule, Rfl: 3        Objective:     Vitals:    07/20/21 0919   BP: 136/80   Pulse: 56   Resp: 16   SpO2: 97%   Weight: 76.7 kg (169 lb)   Height: 157.5 cm (62\")     Body mass index is 30.91 kg/m².      Vitals reviewed.   Constitutional:       General: Not in acute distress.     Appearance: Healthy appearance. Well-developed.   Eyes:      General:         Right eye: No discharge.         Left eye: No discharge.      Conjunctiva/sclera: Conjunctivae normal.   HENT:      Head: Normocephalic and atraumatic.      Right Ear: External ear normal.      Left Ear: External ear normal.      Nose: Nose normal.   Neck:      Thyroid: No thyromegaly.      Vascular: No JVD.      Trachea: No tracheal deviation.      Lymphadenopathy: No cervical adenopathy.   Pulmonary:      Effort: Pulmonary effort is normal. No respiratory distress.      Breath sounds: Normal breath sounds. No wheezing. No rales.   Chest:      Chest wall: Not tender to palpatation.   Cardiovascular:      Normal rate. Regular rhythm.      No gallop.   Pulses:     Intact distal pulses.   Edema:     Peripheral edema absent.   Abdominal:      General: There is no distension.      Palpations: Abdomen is " soft.      Tenderness: There is no abdominal tenderness.   Musculoskeletal: Normal range of motion.         General: No tenderness or deformity.      Cervical back: Normal range of motion and neck supple. Skin:     General: Skin is warm and dry.      Findings: No erythema or rash.   Neurological:      Mental Status: Alert and oriented to person, place, and time.      Coordination: Coordination normal.   Psychiatric:         Attention and Perception: Attention normal.         Mood and Affect: Mood normal.         Speech: Speech normal.         Behavior: Behavior normal.         Thought Content: Thought content normal.         Cognition and Memory: Cognition normal.         Judgment: Judgment normal.               ECG 12 Lead    Date/Time: 7/20/2021 9:20 AM  Performed by: Ewa Yi APRN  Authorized by: Ewa Yi APRN   Comparison: compared with previous ECG from 7/7/2020  Similar to previous ECG  Rhythm: sinus rhythm  Rate: normal  Conduction: conduction normal  ST Segments: ST segments normal  T Waves: T waves normal  QRS axis: normal    Clinical impression: normal ECG              Assessment:       Diagnosis Plan   1. PAF (paroxysmal atrial fibrillation) (CMS/[facility])  Holter Monitor - 48 Hour    Adult Transthoracic Echo Complete W/ Cont if Necessary Per Protocol   2. Essential hypertension     3. Mixed hyperlipidemia            Plan:           1.  Afib - She had one highly symptomatic AF episode and has had no evidence of recurrence.  She will remain on metoprolol.  If her symptoms recur, then she is to take an extra dose of metoprolol, take a dose of apixaban 5mg (samples given) to call me immediately.  Having more palpitations - check 48 hour Holter and echo.     2.  HTN - controlled    3.  Hyperlipidemia - continue lipid lowering therapy, currently on simvastatin 40 mg. Labs as above.  Per PCP    Check Holter and echo.  RTO in 1 year with MARIA E       As always, it has been a pleasure to participate in your  patient's care. Thank you.       Sincerely,       LOUIS Elmore      Current Outpatient Medications:   •  aspirin 81 MG chewable tablet, Chew 81 mg daily., Disp: , Rfl:   •  fluticasone (FLONASE) 50 MCG/ACT nasal spray, 2 sprays into each nostril daily. Administer 2 sprays in each nostril for each dose., Disp: , Rfl:   •  glucose blood (ACCU-CHEK ACTIVE STRIPS) test strip, Use one test strip daily to check fasting glucose for diabetes monitoring. E11.9, Disp: 100 each, Rfl: 12  •  glucose monitor monitoring kit, Use glucose meter to check fasting glucose once daily for diabetes monitoring. E11.9, Disp: 1 each, Rfl: 0  •  hydrocortisone (ANUSOL-HC) 2.5 % rectal cream, Insert  into the rectum 2 (Two) Times a Day., Disp: 30 g, Rfl: 5  •  Lancets (accu-chek soft touch) lancets, Use one lancet daily to check fasting glucose for diabetes monitoring. e11.9, Disp: 100 each, Rfl: 12  •  metoprolol tartrate (LOPRESSOR) 25 MG tablet, Take 1 tablet by mouth 2 (Two) Times a Day. (Patient taking differently: Take 12.5 mg by mouth 2 (Two) Times a Day.), Disp: 180 tablet, Rfl: 3  •  Misc Natural Products (OSTEO BI-FLEX ADV TRIPLE ST PO), Take  by mouth., Disp: , Rfl:   •  montelukast (SINGULAIR) 10 MG tablet, TAKE 1 TABLET BY MOUTH EVERY DAY AT NIGHT, Disp: 90 tablet, Rfl: 3  •  simvastatin (ZOCOR) 40 MG tablet, Take 1 tablet by mouth Every Night., Disp: 90 tablet, Rfl: 3  •  valsartan-hydrochlorothiazide (DIOVAN-HCT) 320-12.5 MG per tablet, Take 1 tablet by mouth Daily., Disp: 90 tablet, Rfl: 3  •  VENTOLIN  (90 Base) MCG/ACT inhaler, USE 2 PUFFS EVERY 4-6 HOURS AS NEEDED FOR COUGHING ,WHEEZING OR SHORTNESS OF BREATH., Disp: , Rfl: 3  •  vitamin D (ERGOCALCIFEROL) 1.25 MG (79018 UT) capsule capsule, TAKE 1 CAPSULE BY MOUTH EVERY 7 DAYS, Disp: 12 capsule, Rfl: 3    Dictated utilizing Dragon dictation

## 2021-07-22 ENCOUNTER — HOSPITAL ENCOUNTER (OUTPATIENT)
Dept: CARDIOLOGY | Facility: HOSPITAL | Age: 71
Discharge: HOME | End: 2021-07-22

## 2021-07-22 VITALS
SYSTOLIC BLOOD PRESSURE: 139 MMHG | WEIGHT: 167.55 LBS | DIASTOLIC BLOOD PRESSURE: 68 MMHG | HEIGHT: 62 IN | HEART RATE: 56 BPM | BODY MASS INDEX: 30.83 KG/M2

## 2021-07-22 DIAGNOSIS — I48.0 PAF (PAROXYSMAL ATRIAL FIBRILLATION) (HCC): ICD-10-CM

## 2021-07-22 LAB
BH CV ECHO MEAS - ACS: 1.7 CM
BH CV ECHO MEAS - AO MAX PG: 9 MMHG
BH CV ECHO MEAS - AO MEAN PG (FULL): 2 MMHG
BH CV ECHO MEAS - AO MEAN PG: 4 MMHG
BH CV ECHO MEAS - AO ROOT AREA (BSA CORRECTED): 1.7
BH CV ECHO MEAS - AO ROOT AREA: 7.1 CM^2
BH CV ECHO MEAS - AO ROOT DIAM: 3 CM
BH CV ECHO MEAS - AO V2 MAX: 149 CM/SEC
BH CV ECHO MEAS - AO V2 MEAN: 94.1 CM/SEC
BH CV ECHO MEAS - AO V2 VTI: 37.5 CM
BH CV ECHO MEAS - ASC AORTA: 3.2 CM
BH CV ECHO MEAS - AVA(I,A): 1.9 CM^2
BH CV ECHO MEAS - AVA(I,D): 1.9 CM^2
BH CV ECHO MEAS - BSA(HAYCOCK): 1.8 M^2
BH CV ECHO MEAS - BSA: 1.8 M^2
BH CV ECHO MEAS - BZI_BMI: 30.8 KILOGRAMS/M^2
BH CV ECHO MEAS - BZI_METRIC_HEIGHT: 157 CM
BH CV ECHO MEAS - BZI_METRIC_WEIGHT: 76 KG
BH CV ECHO MEAS - EDV(CUBED): 121.3 ML
BH CV ECHO MEAS - EDV(MOD-SP2): 111 ML
BH CV ECHO MEAS - EDV(MOD-SP4): 88.1 ML
BH CV ECHO MEAS - EDV(TEICH): 115.5 ML
BH CV ECHO MEAS - EF(CUBED): 64.7 %
BH CV ECHO MEAS - EF(MOD-BP): 56 %
BH CV ECHO MEAS - EF(MOD-SP2): 56.2 %
BH CV ECHO MEAS - EF(MOD-SP4): 57.3 %
BH CV ECHO MEAS - EF(TEICH): 56 %
BH CV ECHO MEAS - ESV(CUBED): 42.9 ML
BH CV ECHO MEAS - ESV(MOD-SP2): 48.6 ML
BH CV ECHO MEAS - ESV(MOD-SP4): 37.6 ML
BH CV ECHO MEAS - ESV(TEICH): 50.9 ML
BH CV ECHO MEAS - FS: 29.3 %
BH CV ECHO MEAS - IVS/LVPW: 0.97
BH CV ECHO MEAS - IVSD: 0.75 CM
BH CV ECHO MEAS - LAT PEAK E' VEL: 8.9 CM/SEC
BH CV ECHO MEAS - LV DIASTOLIC VOL/BSA (35-75): 49.8 ML/M^2
BH CV ECHO MEAS - LV MASS(C)D: 125.4 GRAMS
BH CV ECHO MEAS - LV MASS(C)DI: 70.9 GRAMS/M^2
BH CV ECHO MEAS - LV MAX PG: 3.2 MMHG
BH CV ECHO MEAS - LV MEAN PG: 2 MMHG
BH CV ECHO MEAS - LV SYSTOLIC VOL/BSA (12-30): 21.3 ML/M^2
BH CV ECHO MEAS - LV V1 MAX: 89.4 CM/SEC
BH CV ECHO MEAS - LV V1 MEAN: 60.7 CM/SEC
BH CV ECHO MEAS - LV V1 VTI: 23.1 CM
BH CV ECHO MEAS - LVIDD: 5 CM
BH CV ECHO MEAS - LVIDS: 3.5 CM
BH CV ECHO MEAS - LVLD AP2: 7.9 CM
BH CV ECHO MEAS - LVLD AP4: 7.7 CM
BH CV ECHO MEAS - LVLS AP2: 6.5 CM
BH CV ECHO MEAS - LVLS AP4: 6.2 CM
BH CV ECHO MEAS - LVOT AREA (M): 3.1 CM^2
BH CV ECHO MEAS - LVOT AREA: 3.1 CM^2
BH CV ECHO MEAS - LVOT DIAM: 2 CM
BH CV ECHO MEAS - LVPWD: 0.77 CM
BH CV ECHO MEAS - MED PEAK E' VEL: 6.5 CM/SEC
BH CV ECHO MEAS - MR MEAN PG: 76 MMHG
BH CV ECHO MEAS - MR MEAN VEL: 420 CM/SEC
BH CV ECHO MEAS - MR VTI: 207 CM
BH CV ECHO MEAS - MV A MAX VEL: 90.7 CM/SEC
BH CV ECHO MEAS - MV DEC SLOPE: 552 CM/SEC^2
BH CV ECHO MEAS - MV DEC TIME: 151 SEC
BH CV ECHO MEAS - MV E MAX VEL: 97 CM/SEC
BH CV ECHO MEAS - MV E/A: 1.1
BH CV ECHO MEAS - MV MEAN PG: 2 MMHG
BH CV ECHO MEAS - MV P1/2T MAX VEL: 125.5 CM/SEC
BH CV ECHO MEAS - MV P1/2T: 66.6 MSEC
BH CV ECHO MEAS - MV V2 MEAN: 65.8 CM/SEC
BH CV ECHO MEAS - MV V2 VTI: 38.3 CM
BH CV ECHO MEAS - MVA P1/2T LCG: 1.8 CM^2
BH CV ECHO MEAS - MVA(P1/2T): 3.3 CM^2
BH CV ECHO MEAS - MVA(VTI): 1.9 CM^2
BH CV ECHO MEAS - PA ACC TIME: 0.13 SEC
BH CV ECHO MEAS - PA MAX PG: 3.1 MMHG
BH CV ECHO MEAS - PA PR(ACCEL): 21 MMHG
BH CV ECHO MEAS - PA V2 MAX: 88.3 CM/SEC
BH CV ECHO MEAS - PULM A REVS DUR: 0.12 SEC
BH CV ECHO MEAS - PULM A REVS VEL: 43.2 CM/SEC
BH CV ECHO MEAS - PULM DIAS VEL: 65 CM/SEC
BH CV ECHO MEAS - PULM S/D: 1
BH CV ECHO MEAS - PULM SYS VEL: 67.9 CM/SEC
BH CV ECHO MEAS - QP/QS: 0.64
BH CV ECHO MEAS - RAP SYSTOLE: 8 MMHG
BH CV ECHO MEAS - RV MEAN PG: 1 MMHG
BH CV ECHO MEAS - RV V1 MEAN: 41.8 CM/SEC
BH CV ECHO MEAS - RV V1 VTI: 16.5 CM
BH CV ECHO MEAS - RVOT AREA: 2.8 CM^2
BH CV ECHO MEAS - RVOT DIAM: 1.9 CM
BH CV ECHO MEAS - RVSP: 37 MMHG
BH CV ECHO MEAS - SI(AO): 149.8 ML/M^2
BH CV ECHO MEAS - SI(CUBED): 44.3 ML/M^2
BH CV ECHO MEAS - SI(LVOT): 41 ML/M^2
BH CV ECHO MEAS - SI(MOD-SP2): 35.3 ML/M^2
BH CV ECHO MEAS - SI(MOD-SP4): 28.5 ML/M^2
BH CV ECHO MEAS - SI(TEICH): 36.5 ML/M^2
BH CV ECHO MEAS - SV(AO): 265.1 ML
BH CV ECHO MEAS - SV(CUBED): 78.4 ML
BH CV ECHO MEAS - SV(LVOT): 72.6 ML
BH CV ECHO MEAS - SV(MOD-SP2): 62.4 ML
BH CV ECHO MEAS - SV(MOD-SP4): 50.5 ML
BH CV ECHO MEAS - SV(RVOT): 46.8 ML
BH CV ECHO MEAS - SV(TEICH): 64.6 ML
BH CV ECHO MEAS - TAPSE (>1.6): 2.7 CM
BH CV ECHO MEAS - TR MAX PG: 29 MMHG
BH CV ECHO MEAS - TR MAX VEL: 264.5 CM/SEC
BH CV ECHO MEASUREMENTS AVERAGE E/E' RATIO: 12.6
BH CV XLRA - RV BASE: 3.8 CM
BH CV XLRA - RV LENGTH: 8.3 CM
BH CV XLRA - RV MID: 1.8 CM
BH CV XLRA - TDI S': 12.8 CM/SEC
LEFT ATRIUM VOLUME INDEX: 28.2 ML/M2
MAXIMAL PREDICTED HEART RATE: 149 BPM
STRESS TARGET HR: 127 BPM

## 2021-07-22 PROCEDURE — 93306 TTE W/DOPPLER COMPLETE: CPT

## 2021-07-22 PROCEDURE — 93226 XTRNL ECG REC<48 HR SCAN A/R: CPT

## 2021-07-22 PROCEDURE — 93306 TTE W/DOPPLER COMPLETE: CPT | Performed by: INTERNAL MEDICINE

## 2021-07-22 PROCEDURE — 25010000002 PERFLUTREN (DEFINITY) 8.476 MG IN SODIUM CHLORIDE (PF) 0.9 % 10 ML INJECTION: Performed by: NURSE PRACTITIONER

## 2021-07-22 PROCEDURE — 93225 XTRNL ECG REC<48 HRS REC: CPT

## 2021-07-22 RX ADMIN — SODIUM CHLORIDE 2 ML: 9 INJECTION INTRAMUSCULAR; INTRAVENOUS; SUBCUTANEOUS at 09:07

## 2021-07-23 ENCOUNTER — TELEPHONE (OUTPATIENT)
Dept: CARDIOLOGY | Facility: CLINIC | Age: 71
End: 2021-07-23

## 2021-07-23 NOTE — TELEPHONE ENCOUNTER
----- Message from LOUIS Dacosta sent at 7/22/2021  4:04 PM EDT -----  Echo stable, still with mild to moderate mitral alis regurgitation.  No change since last echo.

## 2021-07-26 LAB
MAXIMAL PREDICTED HEART RATE: 149 BPM
STRESS TARGET HR: 127 BPM

## 2021-07-26 PROCEDURE — 93227 XTRNL ECG REC<48 HR R&I: CPT | Performed by: INTERNAL MEDICINE

## 2021-07-27 NOTE — PROGRESS NOTES
Spoke to patient with results.  Her [id number] score is 3.  Discussed risk versus benefit with patient.  We will start Eliquis 5 mg twice daily.  I did check her creatinine which was within normal range.  She will start tonight.  Rx sent to her pharmacy.

## 2021-07-30 ENCOUNTER — TELEPHONE (OUTPATIENT)
Dept: CARDIOLOGY | Facility: CLINIC | Age: 71
End: 2021-07-30

## 2021-07-30 NOTE — TELEPHONE ENCOUNTER
Pt called and would like to know if she should increase her Metoprolol back to the 25 mg bid dose based off of her holter monitor results.  She is not having any symptoms but just wanted to make sure she didn't need to to anything different.

## 2021-09-03 ENCOUNTER — TELEPHONE (OUTPATIENT)
Dept: INTERNAL MEDICINE | Facility: CLINIC | Age: 71
End: 2021-09-03

## 2021-09-07 DIAGNOSIS — E78.2 MIXED HYPERLIPIDEMIA: ICD-10-CM

## 2021-09-07 DIAGNOSIS — E55.9 VITAMIN D DEFICIENCY: ICD-10-CM

## 2021-09-07 DIAGNOSIS — E11.9 CONTROLLED TYPE 2 DIABETES MELLITUS WITHOUT COMPLICATION, WITHOUT LONG-TERM CURRENT USE OF INSULIN (HCC): Primary | ICD-10-CM

## 2021-09-07 DIAGNOSIS — I10 ESSENTIAL HYPERTENSION: ICD-10-CM

## 2021-09-08 ENCOUNTER — LAB (OUTPATIENT)
Dept: INTERNAL MEDICINE | Facility: CLINIC | Age: 71
End: 2021-09-08

## 2021-09-08 DIAGNOSIS — I10 ESSENTIAL HYPERTENSION: ICD-10-CM

## 2021-09-08 DIAGNOSIS — E78.2 MIXED HYPERLIPIDEMIA: ICD-10-CM

## 2021-09-08 DIAGNOSIS — E11.9 CONTROLLED TYPE 2 DIABETES MELLITUS WITHOUT COMPLICATION, WITHOUT LONG-TERM CURRENT USE OF INSULIN (HCC): ICD-10-CM

## 2021-09-08 DIAGNOSIS — E55.9 VITAMIN D DEFICIENCY: ICD-10-CM

## 2021-09-08 LAB
25(OH)D3+25(OH)D2 SERPL-MCNC: 50.3 NG/ML (ref 30–100)
ALBUMIN SERPL-MCNC: 4.5 G/DL (ref 3.5–5.2)
ALBUMIN/GLOB SERPL: 2.4 G/DL
ALP SERPL-CCNC: 66 U/L (ref 39–117)
ALT SERPL-CCNC: 19 U/L (ref 1–33)
AST SERPL-CCNC: 20 U/L (ref 1–32)
BILIRUB SERPL-MCNC: 0.8 MG/DL (ref 0–1.2)
BUN SERPL-MCNC: 25 MG/DL (ref 8–23)
BUN/CREAT SERPL: 27.2 (ref 7–25)
CALCIUM SERPL-MCNC: 9.6 MG/DL (ref 8.6–10.5)
CHLORIDE SERPL-SCNC: 104 MMOL/L (ref 98–107)
CHOLEST SERPL-MCNC: 156 MG/DL (ref 0–200)
CHOLEST/HDLC SERPL: 3.32 {RATIO}
CO2 SERPL-SCNC: 25.4 MMOL/L (ref 22–29)
CREAT SERPL-MCNC: 0.92 MG/DL (ref 0.57–1)
ERYTHROCYTE [DISTWIDTH] IN BLOOD BY AUTOMATED COUNT: 12.7 % (ref 12.3–15.4)
GLOBULIN SER CALC-MCNC: 1.9 GM/DL
GLUCOSE SERPL-MCNC: 117 MG/DL (ref 65–99)
HBA1C MFR BLD: 6.4 % (ref 4.8–5.6)
HCT VFR BLD AUTO: 38.6 % (ref 34–46.6)
HDLC SERPL-MCNC: 47 MG/DL (ref 40–60)
HGB BLD-MCNC: 13 G/DL (ref 12–15.9)
LDLC SERPL CALC-MCNC: 81 MG/DL (ref 0–100)
MCH RBC QN AUTO: 30.7 PG (ref 26.6–33)
MCHC RBC AUTO-ENTMCNC: 33.7 G/DL (ref 31.5–35.7)
MCV RBC AUTO: 91 FL (ref 79–97)
PLATELET # BLD AUTO: 206 10*3/MM3 (ref 140–450)
POTASSIUM SERPL-SCNC: 4.2 MMOL/L (ref 3.5–5.2)
PROT SERPL-MCNC: 6.4 G/DL (ref 6–8.5)
RBC # BLD AUTO: 4.24 10*6/MM3 (ref 3.77–5.28)
SODIUM SERPL-SCNC: 138 MMOL/L (ref 136–145)
TRIGL SERPL-MCNC: 166 MG/DL (ref 0–150)
VLDLC SERPL CALC-MCNC: 28 MG/DL (ref 5–40)
WBC # BLD AUTO: 4.71 10*3/MM3 (ref 3.4–10.8)

## 2021-09-10 ENCOUNTER — TELEPHONE (OUTPATIENT)
Dept: CARDIOLOGY | Facility: CLINIC | Age: 71
End: 2021-09-10

## 2021-09-10 NOTE — TELEPHONE ENCOUNTER
Tylenol is preferred. If she needs an occasional Advil, then three doses per week is fine with me (400mg dose).    JDK

## 2021-09-10 NOTE — TELEPHONE ENCOUNTER
Pt called wanting to know if she can take tylenol or Advil for her pulled leg muscle since she currently taking Eliquis  5 MG

## 2021-09-14 ENCOUNTER — OFFICE VISIT (OUTPATIENT)
Dept: INTERNAL MEDICINE | Facility: CLINIC | Age: 71
End: 2021-09-14

## 2021-09-14 VITALS
DIASTOLIC BLOOD PRESSURE: 82 MMHG | SYSTOLIC BLOOD PRESSURE: 130 MMHG | TEMPERATURE: 97.1 F | BODY MASS INDEX: 31.69 KG/M2 | OXYGEN SATURATION: 98 % | RESPIRATION RATE: 16 BRPM | HEART RATE: 68 BPM | HEIGHT: 62 IN | WEIGHT: 172.2 LBS

## 2021-09-14 DIAGNOSIS — E11.9 CONTROLLED TYPE 2 DIABETES MELLITUS WITHOUT COMPLICATION, WITHOUT LONG-TERM CURRENT USE OF INSULIN (HCC): ICD-10-CM

## 2021-09-14 DIAGNOSIS — Z00.00 MEDICARE ANNUAL WELLNESS VISIT, SUBSEQUENT: Primary | ICD-10-CM

## 2021-09-14 DIAGNOSIS — E55.9 VITAMIN D DEFICIENCY: ICD-10-CM

## 2021-09-14 DIAGNOSIS — H93.13 TINNITUS OF BOTH EARS: ICD-10-CM

## 2021-09-14 DIAGNOSIS — I10 ESSENTIAL HYPERTENSION: ICD-10-CM

## 2021-09-14 DIAGNOSIS — I48.0 PAF (PAROXYSMAL ATRIAL FIBRILLATION) (HCC): ICD-10-CM

## 2021-09-14 DIAGNOSIS — E78.00 PURE HYPERCHOLESTEROLEMIA: ICD-10-CM

## 2021-09-14 DIAGNOSIS — H91.13 PRESBYCUSIS OF BOTH EARS: ICD-10-CM

## 2021-09-14 LAB
POC CREATININE URINE: 50
POC MICROALBUMIN URINE: 10

## 2021-09-14 PROCEDURE — 99214 OFFICE O/P EST MOD 30 MIN: CPT | Performed by: NURSE PRACTITIONER

## 2021-09-14 PROCEDURE — G0439 PPPS, SUBSEQ VISIT: HCPCS | Performed by: NURSE PRACTITIONER

## 2021-09-14 PROCEDURE — 82044 UR ALBUMIN SEMIQUANTITATIVE: CPT | Performed by: NURSE PRACTITIONER

## 2021-09-14 NOTE — PROGRESS NOTES
The ABCs of the Annual Wellness Visit  Subsequent Medicare Wellness Visit    Chief Complaint   Patient presents with   • Medicare Wellness-subsequent      Subjective    History of Present Illness:  Debbie Cheng is a 70 y.o. female who presents for a Subsequent Medicare Wellness Visit.      She will need a Follow up On Type 2 DM, PAF,  HTN, Hyperlipidemia. And IFG. She is followed by Johnathan Ervin for PAF. She is a die tcontrolled diabetic. She does check her glucose regularly, checks once or twice a week after meals, running 130s. . Fasting She is on Lopressor 25mg BID for HTN and rate control. She takes Eliquis 5mg for anticoagulant. She is tolerating it well. She is not exercising at this time. She is complaining of increased trouble hearing with ringing in ears.       The following portions of the patient's history were reviewed and   updated as appropriate: allergies, current medications, past family history, past medical history, past social history, past surgical history and problem list.    Compared to one year ago, the patient feels her physical   health is the same.    Compared to one year ago, the patient feels her mental   health is the same.    Recent Hospitalizations:  She was not admitted to the hospital during the last year.       Current Medical Providers:  Patient Care Team:  Terra Reddy APRN as PCP - General (Family Medicine)  Trace Onofre MD as Consulting Physician (Ophthalmology)  Keon Mann MD as Consulting Physician (Cardiology)  Parrish Oliver MD as Consulting Physician (Orthopedic Surgery)  Emily Aranda MD as Consulting Physician (Dermatology)    Outpatient Medications Prior to Visit   Medication Sig Dispense Refill   • apixaban (ELIQUIS) 5 MG tablet tablet Take 1 tablet by mouth Every 12 (Twelve) Hours. 180 tablet 3   • aspirin 81 MG chewable tablet Chew 81 mg daily.     • fluticasone (FLONASE) 50 MCG/ACT nasal spray 2 sprays into each nostril daily. Administer 2  sprays in each nostril for each dose.     • glucose blood (ACCU-CHEK ACTIVE STRIPS) test strip Use one test strip daily to check fasting glucose for diabetes monitoring. E11.9 100 each 12   • glucose monitor monitoring kit Use glucose meter to check fasting glucose once daily for diabetes monitoring. E11.9 1 each 0   • hydrocortisone (ANUSOL-HC) 2.5 % rectal cream Insert  into the rectum 2 (Two) Times a Day. 30 g 5   • Lancets (accu-chek soft touch) lancets Use one lancet daily to check fasting glucose for diabetes monitoring. e11.9 100 each 12   • metoprolol tartrate (LOPRESSOR) 25 MG tablet Take 1 tablet by mouth 2 (Two) Times a Day.     • Misc Natural Products (OSTEO BI-FLEX ADV TRIPLE ST PO) Take  by mouth.     • montelukast (SINGULAIR) 10 MG tablet TAKE 1 TABLET BY MOUTH EVERY DAY AT NIGHT 90 tablet 3   • simvastatin (ZOCOR) 40 MG tablet Take 1 tablet by mouth Every Night. 90 tablet 3   • valsartan-hydrochlorothiazide (DIOVAN-HCT) 320-12.5 MG per tablet Take 1 tablet by mouth Daily. 90 tablet 3   • VENTOLIN  (90 Base) MCG/ACT inhaler USE 2 PUFFS EVERY 4-6 HOURS AS NEEDED FOR COUGHING ,WHEEZING OR SHORTNESS OF BREATH.  3   • vitamin D (ERGOCALCIFEROL) 1.25 MG (28577 UT) capsule capsule TAKE 1 CAPSULE BY MOUTH EVERY 7 DAYS 12 capsule 3     No facility-administered medications prior to visit.       No opioid medication identified on active medication list. I have reviewed chart for other potential  high risk medication/s and harmful drug interactions in the elderly.          Aspirin is on active medication list. Aspirin use is indicated based on review of current medical condition/s. Pros and cons of this therapy have been discussed today. Benefits of this medication outweigh potential harm.  Patient has been encouraged to continue taking this medication.  .      Patient Active Problem List   Diagnosis   • PAF (paroxysmal atrial fibrillation) (CMS/HCC)   • Hypertension   • Vitamin D deficiency   •  "Controlled type 2 diabetes mellitus without complication, without long-term current use of insulin (CMS/[facility])   • Medicare annual wellness visit, subsequent   • Hyperlipidemia   • Eczema   • Anemia of unknown etiology   • Postmenopausal   • Varicose veins of both lower extremities with pain   • Family history of renal cancer   • Squamous cell carcinoma in situ (SCCIS) of skin of left upper arm     Advance Care Planning  Advance Directive is not on file.  ACP discussion was held with the patient during this visit. Patient has an advance directive (not in EMR), copy requested.          Objective    Vitals:    09/14/21 0808   BP: 130/82   Pulse: 68   Resp: 16   Temp: 97.1 °F (36.2 °C)   TempSrc: Temporal   SpO2: 98%   Weight: 78.1 kg (172 lb 3.2 oz)   Height: 157 cm (61.81\")   PainSc: 0-No pain     BMI Readings from Last 1 Encounters:   09/14/21 31.69 kg/m²   BMI is above normal parameters. Recommendations include: exercise counseling    Does the patient have evidence of cognitive impairment? No    Physical Exam  Vitals reviewed.   Constitutional:       Appearance: Normal appearance. She is obese. She is not ill-appearing.   HENT:      Head: Normocephalic.      Right Ear: Tympanic membrane normal.      Left Ear: Tympanic membrane normal.   Cardiovascular:      Rate and Rhythm: Normal rate and regular rhythm.      Pulses: Normal pulses.      Heart sounds: Normal heart sounds. No murmur heard.     Musculoskeletal:      Right lower leg: No edema.      Left lower leg: No edema.   Skin:     General: Skin is warm.      Findings: No lesion.   Neurological:      General: No focal deficit present.      Mental Status: She is alert and oriented to person, place, and time.   Psychiatric:         Mood and Affect: Mood normal.         Behavior: Behavior normal.         Thought Content: Thought content normal.       Lab Results   Component Value Date     (H) 09/08/2021    CHLPL 156 09/08/2021    TRIG 166 (H) 09/08/2021    HDL " 47 09/08/2021    LDL 81 09/08/2021    VLDL 28 09/08/2021    HGBA1C 6.40 (H) 09/08/2021            HEALTH RISK ASSESSMENT    Smoking Status:  Social History     Tobacco Use   Smoking Status Never Smoker   Smokeless Tobacco Never Used   Tobacco Comment    CAFFEINE USE : MINIMAL, ONLY DRINKS COFFEE IN THE WINTER, ONE CUP     Alcohol Consumption:  Social History     Substance and Sexual Activity   Alcohol Use No     Fall Risk Screen:    STEADI Fall Risk Assessment was completed, and patient is at MODERATE risk for falls. Assessment completed on:2/26/2021    Depression Screening:  PHQ-2/PHQ-9 Depression Screening 9/14/2021   Little interest or pleasure in doing things 0   Feeling down, depressed, or hopeless 0   Trouble falling or staying asleep, or sleeping too much -   Feeling tired or having little energy -   Poor appetite or overeating -   Feeling bad about yourself - or that you are a failure or have let yourself or your family down -   Trouble concentrating on things, such as reading the newspaper or watching television -   Moving or speaking so slowly that other people could have noticed. Or the opposite - being so fidgety or restless that you have been moving around a lot more than usual -   Thoughts that you would be better off dead, or of hurting yourself in some way -   Total Score 0   If you checked off any problems, how difficult have these problems made it for you to do your work, take care of things at home, or get along with other people? -       Health Habits and Functional and Cognitive Screening:  Functional & Cognitive Status 9/14/2021   Do you have difficulty preparing food and eating? No   Do you have difficulty bathing yourself, getting dressed or grooming yourself? No   Do you have difficulty using the toilet? No   Do you have difficulty moving around from place to place? No   Do you have trouble with steps or getting out of a bed or a chair? No   Current Diet Limited Junk Food   Dental Exam Up to  date   Eye Exam Up to date   Exercise (times per week) 4 times per week   Current Exercises Include Walking   Current Exercise Activities Include -   Do you need help using the phone?  No   Are you deaf or do you have serious difficulty hearing?  Yes   Do you need help with transportation? No   Do you need help shopping? No   Do you need help preparing meals?  No   Do you need help with housework?  No   Do you need help with laundry? No   Do you need help taking your medications? No   Do you need help managing money? No   Do you ever drive or ride in a car without wearing a seat belt? No   Have you felt unusual stress, anger or loneliness in the last month? Yes   Who do you live with? Spouse   If you need help, do you have trouble finding someone available to you? No   Have you been bothered in the last four weeks by sexual problems? No   Do you have difficulty concentrating, remembering or making decisions? -       Age-appropriate Screening Schedule:  Refer to the list below for future screening recommendations based on patient's age, sex and/or medical conditions. Orders for these recommended tests are listed in the plan section. The patient has been provided with a written plan.    Health Maintenance   Topic Date Due   • ZOSTER VACCINE (2 of 3) 02/26/2011   • DIABETIC EYE EXAM  03/13/2021   • INFLUENZA VACCINE  10/01/2021   • URINE MICROALBUMIN  11/24/2021   • HEMOGLOBIN A1C  03/08/2022   • LIPID PANEL  09/08/2022   • MAMMOGRAM  11/19/2022   • DXA SCAN  10/25/2023   • TDAP/TD VACCINES (4 - Td or Tdap) 08/26/2026   • DIABETIC FOOT EXAM  Discontinued              Assessment/Plan   CMS Preventative Services Quick Reference  Risk Factors Identified During Encounter  Cardiovascular Disease  Immunizations Discussed/Encouraged (specific Immunizations; Shingrix and COVID19  Inactivity/Sedentary  Obesity/Overweight   Polypharmacy  The above risks/problems have been discussed with the patient.  Follow up actions/plans if  indicated are seen below in the Assessment/Plan Section.  Pertinent information has been shared with the patient in the After Visit Summary.     Diagnosis Plan   1. Medicare annual wellness visit, subsequent     2. Controlled type 2 diabetes mellitus without complication, without long-term current use of insulin (CMS/[facility])  Comprehensive Metabolic Panel    Lipid Panel With / Chol / HDL Ratio    Hemoglobin A1c    POCT microalbumin    Hgb A1c at goal   3. PAF (paroxysmal atrial fibrillation) (CMS/[facility])  Comprehensive Metabolic Panel    Lipid Panel With / Chol / HDL Ratio    On Eliquis and aspirin therapy   4. Essential hypertension  Comprehensive Metabolic Panel    Lipid Panel With / Chol / HDL Ratio    CBC & Differential   5. Pure hypercholesterolemia  Comprehensive Metabolic Panel    Lipid Panel With / Chol / HDL Ratio    CBC & Differential    LDL gaol < 100   6. Vitamin D deficiency     7. Tinnitus of both ears  Ambulatory Referral to Audiology   8. Presbycusis of both ears  Ambulatory Referral to Audiology     Follow Up:      6 months w labs    An After Visit Summary and PPPS were made available to the patient.

## 2021-09-20 DIAGNOSIS — E78.2 MIXED HYPERLIPIDEMIA: ICD-10-CM

## 2021-09-20 RX ORDER — SIMVASTATIN 40 MG
TABLET ORAL
Qty: 90 TABLET | Refills: 3 | Status: SHIPPED | OUTPATIENT
Start: 2021-09-20 | End: 2022-11-02

## 2021-10-19 ENCOUNTER — TRANSCRIBE ORDERS (OUTPATIENT)
Dept: INTERNAL MEDICINE | Facility: CLINIC | Age: 71
End: 2021-10-19

## 2021-10-19 ENCOUNTER — TELEPHONE (OUTPATIENT)
Dept: CARDIOLOGY | Facility: CLINIC | Age: 71
End: 2021-10-19

## 2021-10-19 DIAGNOSIS — Z12.31 OTHER SCREENING MAMMOGRAM: Primary | ICD-10-CM

## 2021-10-19 NOTE — TELEPHONE ENCOUNTER
Pt left a vm that she has been having an upset stomach while taking the supplement magnesium oxide 400 mg.     I have tried calling pt back, but had to leave a vm.

## 2021-10-20 NOTE — TELEPHONE ENCOUNTER
Called pt issue resolved.  Pt states that when she eats prior to taking the magnesium it is not as hard on her stomach.

## 2021-11-01 DIAGNOSIS — I10 ESSENTIAL HYPERTENSION: ICD-10-CM

## 2021-11-01 NOTE — TELEPHONE ENCOUNTER
Rx Refill Note  Requested Prescriptions     Pending Prescriptions Disp Refills    valsartan-hydrochlorothiazide (DIOVAN-HCT) 320-12.5 MG per tablet [Pharmacy Med Name: VALSARTAN-HCTZ 320-12.5 MG TAB] 90 tablet 3     Sig: TAKE 1 TABLET BY MOUTH EVERY DAY      Last office visit with prescribing clinician: 9/14/2021      Next office visit with prescribing clinician: 3/14/2022            Lenka Rutledge  11/01/21, 16:18 EDT

## 2021-11-02 RX ORDER — VALSARTAN AND HYDROCHLOROTHIAZIDE 320; 12.5 MG/1; MG/1
TABLET, FILM COATED ORAL
Qty: 90 TABLET | Refills: 3 | Status: SHIPPED | OUTPATIENT
Start: 2021-11-02 | End: 2022-12-15

## 2021-11-26 ENCOUNTER — HOSPITAL ENCOUNTER (OUTPATIENT)
Dept: MAMMOGRAPHY | Facility: HOSPITAL | Age: 71
Discharge: HOME | End: 2021-11-26
Admitting: NURSE PRACTITIONER

## 2021-11-26 DIAGNOSIS — Z12.31 OTHER SCREENING MAMMOGRAM: ICD-10-CM

## 2021-11-26 PROCEDURE — 77067 SCR MAMMO BI INCL CAD: CPT

## 2021-11-26 PROCEDURE — 77063 BREAST TOMOSYNTHESIS BI: CPT

## 2021-12-28 DIAGNOSIS — E11.9 CONTROLLED TYPE 2 DIABETES MELLITUS WITHOUT COMPLICATION, WITHOUT LONG-TERM CURRENT USE OF INSULIN (HCC): ICD-10-CM

## 2021-12-28 RX ORDER — BLOOD SUGAR DIAGNOSTIC
STRIP MISCELLANEOUS
Qty: 100 EACH | Refills: 3 | Status: SHIPPED | OUTPATIENT
Start: 2021-12-28 | End: 2022-11-18 | Stop reason: SDUPTHER

## 2021-12-28 NOTE — TELEPHONE ENCOUNTER
Rx Refill Note  Requested Prescriptions     Pending Prescriptions Disp Refills    OneTouch Ultra test strip [Pharmacy Med Name: ONE TOUCH ULTRA BLUE TEST STRP]  12     Sig: USE ONE TEST STRIP DAILY TO CHECK FASTING GLUCOSE FOR DIABETES MONITORING. E11.9      Last office visit with prescribing clinician: 9/14/2021      Next office visit with prescribing clinician: 3/14/2022            Kasey Marroquin MA  12/28/21, 08:09 EST

## 2022-01-19 ENCOUNTER — TELEPHONE (OUTPATIENT)
Dept: CARDIOLOGY | Facility: CLINIC | Age: 72
End: 2022-01-19

## 2022-01-19 NOTE — TELEPHONE ENCOUNTER
----- Message from Stuart Toribio Rep sent at 1/19/2022  2:21 PM EST -----  Regarding: Eloquis alternative  Dr. Mann,  Pt's insurance has changed and now her Eloquis has increased dramatically. She would like to know if there is an alternative.      Pt's # [phone]

## 2022-01-19 NOTE — TELEPHONE ENCOUNTER
Called and spoke with patient. She said she checked with her insurance and xarelto is more affordable. She said she has taken Xarelto before and she seemed to do well on that. She would like if you could send in a 90 day script to the [facility].    Thank you,    Soniya Vieyra, RN  Triage [facility]

## 2022-01-19 NOTE — TELEPHONE ENCOUNTER
Will you please call and chat with her?  1 option is to call her prescription insurance plan and see if Xarelto or Pradaxa may have moved to a lower price formulary.  If not, then the only other option is going to be warfarin.

## 2022-01-20 ENCOUNTER — TELEPHONE (OUTPATIENT)
Dept: CARDIOLOGY | Facility: CLINIC | Age: 72
End: 2022-01-20

## 2022-01-20 NOTE — TELEPHONE ENCOUNTER
----- Message from Stuart Toribio Rep sent at 1/19/2022  2:21 PM EST -----  Regarding: Eloquis alternative  Dr. Mann,  Pt's insurance has changed and now her Eloquis has increased dramatically. She would like to know if there is an alternative.      Pt's # [phone]

## 2022-01-25 NOTE — ADDENDUM NOTE
Addended by: JARET BURNS on: 1/25/2022 03:43 PM     Modules accepted: Level of Service, SmartSet

## 2022-01-28 ENCOUNTER — TELEPHONE (OUTPATIENT)
Dept: CARDIOLOGY | Facility: CLINIC | Age: 72
End: 2022-01-28

## 2022-01-28 DIAGNOSIS — E55.9 VITAMIN D DEFICIENCY: ICD-10-CM

## 2022-01-28 RX ORDER — ERGOCALCIFEROL 1.25 MG/1
CAPSULE ORAL
Qty: 12 CAPSULE | Refills: 3 | Status: SHIPPED | OUTPATIENT
Start: 2022-01-28 | End: 2023-01-03

## 2022-01-28 NOTE — TELEPHONE ENCOUNTER
Rx Refill Note  Requested Prescriptions     Pending Prescriptions Disp Refills    vitamin D (ERGOCALCIFEROL) 1.25 MG (60258 UT) capsule capsule [Pharmacy Med Name: VITAMIN D2 1.25MG(50,000 UNIT)] 12 capsule 3     Sig: TAKE 1 CAPSULE BY MOUTH EVERY 7 DAYS      Last office visit with prescribing clinician: 9/14/2021      Next office visit with prescribing clinician: 3/14/2022            Kasey Marroquin, PCT  01/28/22, 08:45 EST

## 2022-01-28 NOTE — TELEPHONE ENCOUNTER
Please verify with patient what she is actually taking right now; that is what she should be on. Please let pharmacy know.    Dayo baumann

## 2022-01-28 NOTE — TELEPHONE ENCOUNTER
LOV with JF 7/20/21  Labs 9/8/21    Pharmacy is requesting a new prescription with updated directions.

## 2022-01-28 NOTE — TELEPHONE ENCOUNTER
The patient phoned today after receiving her refill of metoprolol from her pharmacy.  The pharmacy questioned the patient regarding the dosage.  They still have the patient on 12.5 mg instead of the 25 mg b.i.d. of Metoprolol.  The pharmacy and patient are requesting clarification.   The  Telephone notes of 7/30/21, 8/3/21 indicate metoprolol 25 mg b.i.d., and the prescription just refilled confirms that dosage.  Please review and advise.    Thank sinan

## 2022-01-28 NOTE — TELEPHONE ENCOUNTER
Spoke with pt and verified she has been taking metoprolol 25mg BID.  Updated pharmacy and they requested a new prescription be sent in.  I forwarded that request to RACHELLE.

## 2022-03-04 ENCOUNTER — OFFICE VISIT (OUTPATIENT)
Dept: INTERNAL MEDICINE | Facility: CLINIC | Age: 72
End: 2022-03-04

## 2022-03-04 VITALS
BODY MASS INDEX: 31.3 KG/M2 | DIASTOLIC BLOOD PRESSURE: 72 MMHG | HEIGHT: 62 IN | SYSTOLIC BLOOD PRESSURE: 118 MMHG | TEMPERATURE: 97.7 F | OXYGEN SATURATION: 97 % | HEART RATE: 105 BPM | WEIGHT: 170.1 LBS | RESPIRATION RATE: 20 BRPM

## 2022-03-04 DIAGNOSIS — J20.9 ACUTE BRONCHITIS, UNSPECIFIED ORGANISM: Primary | ICD-10-CM

## 2022-03-04 DIAGNOSIS — J45.21 MILD INTERMITTENT ASTHMA WITH ACUTE EXACERBATION: ICD-10-CM

## 2022-03-04 LAB
EXPIRATION DATE: NORMAL
FLUAV AG NPH QL: NEGATIVE
FLUBV AG NPH QL: NEGATIVE
INTERNAL CONTROL: NORMAL
Lab: NORMAL

## 2022-03-04 PROCEDURE — 99214 OFFICE O/P EST MOD 30 MIN: CPT | Performed by: NURSE PRACTITIONER

## 2022-03-04 PROCEDURE — 87804 INFLUENZA ASSAY W/OPTIC: CPT | Performed by: NURSE PRACTITIONER

## 2022-03-04 RX ORDER — BENZONATATE 100 MG/1
100 CAPSULE ORAL 3 TIMES DAILY PRN
Qty: 60 CAPSULE | Refills: 0 | Status: SHIPPED | OUTPATIENT
Start: 2022-03-04 | End: 2022-03-04 | Stop reason: CLARIF

## 2022-03-04 RX ORDER — DOXYCYCLINE HYCLATE 100 MG/1
100 CAPSULE ORAL 2 TIMES DAILY
Qty: 20 CAPSULE | Refills: 0 | Status: SHIPPED | OUTPATIENT
Start: 2022-03-04 | End: 2022-03-14

## 2022-03-04 RX ORDER — DEXTROMETHORPHAN HYDROBROMIDE AND PROMETHAZINE HYDROCHLORIDE 15; 6.25 MG/5ML; MG/5ML
5 SYRUP ORAL 4 TIMES DAILY PRN
Qty: 118 ML | Refills: 0 | Status: SHIPPED | OUTPATIENT
Start: 2022-03-04 | End: 2022-04-01

## 2022-03-04 RX ORDER — METHYLPREDNISOLONE 4 MG/1
TABLET ORAL
Qty: 21 EACH | Refills: 0 | Status: SHIPPED | OUTPATIENT
Start: 2022-03-04 | End: 2022-04-01

## 2022-03-04 RX ORDER — LEVOCETIRIZINE DIHYDROCHLORIDE 5 MG/1
5 TABLET, FILM COATED ORAL EVERY MORNING
COMMUNITY
Start: 2021-12-28

## 2022-03-04 NOTE — PROGRESS NOTES
"Debbie Cheng is a 71 y.o. female presenting today for   Chief Complaint   Patient presents with   • URI     test neg for COVID Last night and this morning both neg, symptoms started on Sunday after her 2 mile walk    • Cough   • Fever       Subjective    History of Present Illness     Onset was Monday 02/28.   S&S include cough, fever (Tmax 100), body aches, chest congestion, wheezing    She is vaccinated against COVID-19 and has received a booster dose as well. No known ill contacts.    OTCs: none  She has used her inhaler w/ mild relief.    The following portions of the patient's history were reviewed and updated as appropriate: allergies, current medications, problem list, past medical history, past surgical history, family history, and social history.    Review of Systems   Constitutional: Positive for appetite change, fatigue and fever.   HENT: Negative for congestion, rhinorrhea and sore throat.         No loss of t/s. Denies tinnitus.   Respiratory: Positive for cough and wheezing.    Gastrointestinal: Negative for diarrhea, nausea and vomiting.   Musculoskeletal: Positive for myalgias.   Neurological: Negative for headache.         Objective    Vitals:    03/04/22 1518   BP: 118/72   Pulse: 105   Resp: 20   Temp: 97.7 °F (36.5 °C)   TempSrc: Temporal   SpO2: 97%   Weight: 77.2 kg (170 lb 1.6 oz)   Height: 157 cm (61.81\")     Body mass index is 31.3 kg/m².  Nursing notes and vitals reviewed.    Physical Exam  Constitutional:       General: She is not in acute distress.     Appearance: She is well-developed.   HENT:      Head: Normocephalic.      Right Ear: Hearing, tympanic membrane, ear canal and external ear normal.      Left Ear: Hearing, tympanic membrane, ear canal and external ear normal.      Nose: Nose normal.      Right Sinus: No maxillary sinus tenderness or frontal sinus tenderness.      Left Sinus: No maxillary sinus tenderness or frontal sinus tenderness.      Mouth/Throat:      Mouth: Mucous " membranes are moist.      Pharynx: Oropharynx is clear. Uvula midline.   Neck:      Thyroid: No thyroid mass or thyromegaly.   Cardiovascular:      Rate and Rhythm: Regular rhythm.      Pulses: Normal pulses.      Heart sounds: S1 normal and S2 normal. No murmur heard.  No friction rub. No gallop.    Pulmonary:      Effort: Pulmonary effort is normal.      Breath sounds: Examination of the right-upper field reveals wheezing. Examination of the left-upper field reveals wheezing. Examination of the right-lower field reveals wheezing. Examination of the left-lower field reveals wheezing. Wheezing (end expiratory) present. No rhonchi or rales.      Comments: Harsh persistent cough  Musculoskeletal:      Cervical back: Neck supple.   Lymphadenopathy:      Cervical: No cervical adenopathy.   Neurological:      Mental Status: She is alert and oriented to person, place, and time.      Cranial Nerves: No cranial nerve deficit.      Sensory: No sensory deficit.   Psychiatric:         Attention and Perception: She is attentive.         Speech: Speech normal.         Behavior: Behavior normal.       Recent Results (from the past 24 hour(s))   POC Influenza A / B    Collection Time: 03/04/22  4:05 PM    Specimen: Swab   Result Value Ref Range    Rapid Influenza A Ag Negative Negative    Rapid Influenza B Ag Negative Negative    Internal Control Passed Passed    Lot Number [id number]     Expiration Date 8,676,022          Assessment and Plan    Diagnoses and all orders for this visit:    1. Acute bronchitis, unspecified organism (Primary)  -     COVID-19,[facility] ROUTINE, NP/OP SWAB IN TRANSPORT MEDIA OR ESWAB 72 HR TAT - Swab, Nasopharynx  -     POC Influenza A / B  -     doxycycline (VIBRAMYCIN) 100 MG capsule; Take 1 capsule by mouth 2 (Two) Times a Day for 10 days.  Dispense: 20 capsule; Refill: 0  -     methylPREDNISolone (MEDROL) 4 MG dose pack; Take as directed on package instructions.  Dispense: 21 each; Refill: 0  -      Discontinue: benzonatate (Tessalon Perles) 100 MG capsule; Take 1 capsule by mouth 3 (Three) Times a Day As Needed for Cough.  Dispense: 60 capsule; Refill: 0  -     promethazine-dextromethorphan (PROMETHAZINE-DM) 6.25-15 MG/5ML syrup; Take 5 mL by mouth 4 (Four) Times a Day As Needed for Cough.  Dispense: 118 mL; Refill: 0    2. Mild intermittent asthma with acute exacerbation  -     methylPREDNISolone (MEDROL) 4 MG dose pack; Take as directed on package instructions.  Dispense: 21 each; Refill: 0            Medications, including side effects, were discussed with the patient. Patient verbalized understanding.  The plan of care was discussed. All questions were answered. Patient verbalized understanding.        Return if symptoms worsen or fail to improve.

## 2022-03-05 LAB
LABCORP SARS-COV-2, NAA 2 DAY TAT: NORMAL
SARS-COV-2 RNA RESP QL NAA+PROBE: NOT DETECTED

## 2022-03-25 ENCOUNTER — LAB (OUTPATIENT)
Dept: INTERNAL MEDICINE | Facility: CLINIC | Age: 72
End: 2022-03-25

## 2022-03-25 DIAGNOSIS — E78.00 PURE HYPERCHOLESTEROLEMIA: ICD-10-CM

## 2022-03-25 DIAGNOSIS — I48.0 PAF (PAROXYSMAL ATRIAL FIBRILLATION): ICD-10-CM

## 2022-03-25 DIAGNOSIS — I10 ESSENTIAL HYPERTENSION: ICD-10-CM

## 2022-03-25 DIAGNOSIS — E11.9 CONTROLLED TYPE 2 DIABETES MELLITUS WITHOUT COMPLICATION, WITHOUT LONG-TERM CURRENT USE OF INSULIN: ICD-10-CM

## 2022-03-26 LAB
ALBUMIN SERPL-MCNC: 4.2 G/DL (ref 3.7–4.7)
ALBUMIN/GLOB SERPL: 2 {RATIO} (ref 1.2–2.2)
ALP SERPL-CCNC: 62 IU/L (ref 44–121)
ALT SERPL-CCNC: 18 IU/L (ref 0–32)
AST SERPL-CCNC: 22 IU/L (ref 0–40)
BASOPHILS # BLD AUTO: 0 X10E3/UL (ref 0–0.2)
BASOPHILS NFR BLD AUTO: 0 %
BILIRUB SERPL-MCNC: 1 MG/DL (ref 0–1.2)
BUN SERPL-MCNC: 17 MG/DL (ref 8–27)
BUN/CREAT SERPL: 19 (ref 12–28)
CALCIUM SERPL-MCNC: 9.3 MG/DL (ref 8.7–10.3)
CHLORIDE SERPL-SCNC: 102 MMOL/L (ref 96–106)
CHOLEST SERPL-MCNC: 141 MG/DL (ref 100–199)
CHOLEST/HDLC SERPL: 2.9 RATIO (ref 0–4.4)
CO2 SERPL-SCNC: 25 MMOL/L (ref 20–29)
CREAT SERPL-MCNC: 0.91 MG/DL (ref 0.57–1)
EGFRCR SERPLBLD CKD-EPI 2021: 67 ML/MIN/1.73
EOSINOPHIL # BLD AUTO: 0.1 X10E3/UL (ref 0–0.4)
EOSINOPHIL NFR BLD AUTO: 2 %
ERYTHROCYTE [DISTWIDTH] IN BLOOD BY AUTOMATED COUNT: 12.4 % (ref 11.7–15.4)
GLOBULIN SER CALC-MCNC: 2.1 G/DL (ref 1.5–4.5)
GLUCOSE SERPL-MCNC: 124 MG/DL (ref 65–99)
HBA1C MFR BLD: 6.7 % (ref 4.8–5.6)
HCT VFR BLD AUTO: 38.4 % (ref 34–46.6)
HDLC SERPL-MCNC: 49 MG/DL
HGB BLD-MCNC: 12.9 G/DL (ref 11.1–15.9)
IMM GRANULOCYTES # BLD AUTO: 0 X10E3/UL (ref 0–0.1)
IMM GRANULOCYTES NFR BLD AUTO: 0 %
LDLC SERPL CALC-MCNC: 72 MG/DL (ref 0–99)
LYMPHOCYTES # BLD AUTO: 1.1 X10E3/UL (ref 0.7–3.1)
LYMPHOCYTES NFR BLD AUTO: 30 %
MCH RBC QN AUTO: 30.6 PG (ref 26.6–33)
MCHC RBC AUTO-ENTMCNC: 33.6 G/DL (ref 31.5–35.7)
MCV RBC AUTO: 91 FL (ref 79–97)
MONOCYTES # BLD AUTO: 0.5 X10E3/UL (ref 0.1–0.9)
MONOCYTES NFR BLD AUTO: 14 %
NEUTROPHILS # BLD AUTO: 2 X10E3/UL (ref 1.4–7)
NEUTROPHILS NFR BLD AUTO: 54 %
PLATELET # BLD AUTO: 209 X10E3/UL (ref 150–450)
POTASSIUM SERPL-SCNC: 4.2 MMOL/L (ref 3.5–5.2)
PROT SERPL-MCNC: 6.3 G/DL (ref 6–8.5)
RBC # BLD AUTO: 4.21 X10E6/UL (ref 3.77–5.28)
SODIUM SERPL-SCNC: 141 MMOL/L (ref 134–144)
TRIGL SERPL-MCNC: 110 MG/DL (ref 0–149)
VLDLC SERPL CALC-MCNC: 20 MG/DL (ref 5–40)
WBC # BLD AUTO: 3.7 X10E3/UL (ref 3.4–10.8)

## 2022-04-01 ENCOUNTER — OFFICE VISIT (OUTPATIENT)
Dept: INTERNAL MEDICINE | Facility: CLINIC | Age: 72
End: 2022-04-01

## 2022-04-01 VITALS
OXYGEN SATURATION: 95 % | SYSTOLIC BLOOD PRESSURE: 130 MMHG | DIASTOLIC BLOOD PRESSURE: 70 MMHG | BODY MASS INDEX: 31.28 KG/M2 | HEIGHT: 62 IN | WEIGHT: 170 LBS | HEART RATE: 75 BPM

## 2022-04-01 DIAGNOSIS — I10 PRIMARY HYPERTENSION: ICD-10-CM

## 2022-04-01 DIAGNOSIS — L03.032 ONYCHIA OF TOE OF LEFT FOOT: ICD-10-CM

## 2022-04-01 DIAGNOSIS — Z91.09 ENVIRONMENTAL ALLERGIES: ICD-10-CM

## 2022-04-01 DIAGNOSIS — E11.9 CONTROLLED TYPE 2 DIABETES MELLITUS WITHOUT COMPLICATION, WITHOUT LONG-TERM CURRENT USE OF INSULIN: Primary | ICD-10-CM

## 2022-04-01 DIAGNOSIS — I48.0 PAF (PAROXYSMAL ATRIAL FIBRILLATION): ICD-10-CM

## 2022-04-01 PROCEDURE — 99214 OFFICE O/P EST MOD 30 MIN: CPT | Performed by: NURSE PRACTITIONER

## 2022-04-01 NOTE — PROGRESS NOTES
Chief Complaint   Patient presents with   • Follow-up       Subjective     Debbie Cheng is a 71 y.o. female being seen for a follow up appointment today regarding Type 2 DM, PAF,  HTN, Hyperlipidemia. and IFG. She is followed by Johnathan Ervin for PAF. She is on Xarelto 20mg daily. She is a diet controlled diabetic. She does check her glucose regularly, checks once or twice a week after meals, running 110-130s.      She is on Diovan /12.5 mg and Lopressor 25mg BID for HTN and rate control. She is tolerating it well. She has been doing [employer] and bicycling on a tricycle.     She has been having seasonal allergies. She is on Xyzal 5mg, Singulair 10mg and Flonase.    She is also reporting thick toe nails of left foot     History of Present Illness     Allergies   Allergen Reactions   • Penicillins Anaphylaxis and Swelling   • Erythromycin Other (See Comments)     JOINT PAIN AND SWELLING         Current Outpatient Medications:   •  fluticasone (FLONASE) 50 MCG/ACT nasal spray, 2 sprays into the nostril(s) as directed by provider Daily. Administer 2 sprays in each nostril for each dose., Disp: , Rfl:   •  glucose monitor monitoring kit, Use glucose meter to check fasting glucose once daily for diabetes monitoring. E11.9, Disp: 1 each, Rfl: 0  •  Lancets (accu-chek soft touch) lancets, Use one lancet daily to check fasting glucose for diabetes monitoring. e11.9, Disp: 100 each, Rfl: 12  •  levocetirizine (XYZAL) 5 MG tablet, Take 5 mg by mouth Every Morning., Disp: , Rfl:   •  magnesium oxide (MAGOX) 400 (241.3 Mg) MG tablet tablet, Take 400 mg by mouth Daily., Disp: , Rfl:   •  metoprolol tartrate (LOPRESSOR) 25 MG tablet, Take 1 tablet by mouth 2 (Two) Times a Day., Disp: 180 tablet, Rfl: 1  •  montelukast (SINGULAIR) 10 MG tablet, TAKE 1 TABLET BY MOUTH EVERY DAY AT NIGHT, Disp: 90 tablet, Rfl: 3  •  OneTouch Ultra test strip, USE ONE TEST STRIP DAILY TO CHECK FASTING GLUCOSE FOR DIABETES MONITORING. E11.9, Disp: 100  each, Rfl: 3  •  rivaroxaban (Xarelto) 20 MG tablet, Take 1 tablet by mouth Daily., Disp: 90 tablet, Rfl: 3  •  simvastatin (ZOCOR) 40 MG tablet, TAKE 1 TABLET BY MOUTH EVERY DAY AT NIGHT, Disp: 90 tablet, Rfl: 3  •  valsartan-hydrochlorothiazide (DIOVAN-HCT) 320-12.5 MG per tablet, TAKE 1 TABLET BY MOUTH EVERY DAY, Disp: 90 tablet, Rfl: 3  •  VENTOLIN  (90 Base) MCG/ACT inhaler, USE 2 PUFFS EVERY 4-6 HOURS AS NEEDED FOR COUGHING ,WHEEZING OR SHORTNESS OF BREATH., Disp: , Rfl: 3  •  vitamin D (ERGOCALCIFEROL) 1.25 MG (68955 UT) capsule capsule, TAKE 1 CAPSULE BY MOUTH EVERY 7 DAYS, Disp: 12 capsule, Rfl: 3    The following portions of the patient's history were reviewed and updated as appropriate: allergies, current medications, past family history, past medical history, past social history, past surgical history and problem list.    Review of Systems   Constitutional: Negative.    HENT: Positive for congestion.    Eyes: Negative.    Respiratory: Negative.  Negative for shortness of breath and stridor.    Cardiovascular: Negative.  Negative for chest pain, palpitations and leg swelling.   Gastrointestinal: Negative.    Endocrine: Negative.    Genitourinary: Negative.    Musculoskeletal: Negative.    Allergic/Immunologic: Positive for environmental allergies.   Neurological: Negative.    Psychiatric/Behavioral: The patient is nervous/anxious.    All other systems reviewed and are negative.      Assessment     Physical Exam  Vitals reviewed.   Constitutional:       Appearance: Normal appearance. She is obese.   HENT:      Left Ear: Tympanic membrane normal.   Cardiovascular:      Rate and Rhythm: Normal rate and regular rhythm.      Pulses: Normal pulses.      Heart sounds: Normal heart sounds. No murmur heard.  Pulmonary:      Effort: Pulmonary effort is normal. No respiratory distress.      Breath sounds: Normal breath sounds.   Musculoskeletal:      Cervical back: Neck supple.      Right lower leg: No edema.       Left lower leg: No edema.   Feet:      Left foot:      Toenail Condition: Left toenails are abnormally thick. Fungal disease present.  Skin:     General: Skin is warm and dry.   Neurological:      General: No focal deficit present.      Mental Status: She is alert and oriented to person, place, and time.   Psychiatric:         Mood and Affect: Mood normal.         Behavior: Behavior normal.         Thought Content: Thought content normal.         Plan     Her fasting labs were reviewed with the patient from last week.     Diagnoses and all orders for this visit:    1. Controlled type 2 diabetes mellitus without complication, without long-term current use of insulin ([facility]) (Primary)  -     CBC & Differential; Future  -     Comprehensive Metabolic Panel; Future  -     Lipid Panel With / Chol / HDL Ratio; Future  -     Hemoglobin A1c; Future  -     Ambulatory Referral to Podiatry    2. Primary hypertension  -     CBC & Differential; Future  -     Comprehensive Metabolic Panel; Future  -     Lipid Panel With / Chol / HDL Ratio; Future  -     Hemoglobin A1c; Future    3. PAF (paroxysmal atrial fibrillation) ([facility])    4. Environmental allergies    5. Onychia of toe of left foot  -     Ambulatory Referral to Podiatry      Follow up in 6 mo w labs

## 2022-05-14 ENCOUNTER — APPOINTMENT (OUTPATIENT)
Dept: GENERAL RADIOLOGY | Facility: HOSPITAL | Age: 72
End: 2022-05-14

## 2022-05-14 ENCOUNTER — HOSPITAL ENCOUNTER (EMERGENCY)
Facility: HOSPITAL | Age: 72
Discharge: HOME | End: 2022-05-14
Attending: EMERGENCY MEDICINE | Admitting: EMERGENCY MEDICINE

## 2022-05-14 VITALS
HEART RATE: 66 BPM | HEIGHT: 61 IN | DIASTOLIC BLOOD PRESSURE: 89 MMHG | BODY MASS INDEX: 31.72 KG/M2 | WEIGHT: 168 LBS | SYSTOLIC BLOOD PRESSURE: 149 MMHG | RESPIRATION RATE: 16 BRPM | OXYGEN SATURATION: 96 % | TEMPERATURE: 98.2 F

## 2022-05-14 DIAGNOSIS — I49.3 PVC'S (PREMATURE VENTRICULAR CONTRACTIONS): Primary | ICD-10-CM

## 2022-05-14 LAB
ALBUMIN SERPL-MCNC: 4 G/DL (ref 3.5–5.2)
ALBUMIN/GLOB SERPL: 1.5 G/DL
ALP SERPL-CCNC: 72 U/L (ref 39–117)
ALT SERPL W P-5'-P-CCNC: 18 U/L (ref 1–33)
ANION GAP SERPL CALCULATED.3IONS-SCNC: 13.2 MMOL/L (ref 5–15)
AST SERPL-CCNC: 20 U/L (ref 1–32)
BASOPHILS # BLD AUTO: 0.02 10*3/MM3 (ref 0–0.2)
BASOPHILS NFR BLD AUTO: 0.3 % (ref 0–1.5)
BILIRUB SERPL-MCNC: 0.5 MG/DL (ref 0–1.2)
BUN SERPL-MCNC: 26 MG/DL (ref 8–23)
BUN/CREAT SERPL: 22.8 (ref 7–25)
CALCIUM SPEC-SCNC: 9 MG/DL (ref 8.6–10.5)
CHLORIDE SERPL-SCNC: 101 MMOL/L (ref 98–107)
CO2 SERPL-SCNC: 23.8 MMOL/L (ref 22–29)
CREAT SERPL-MCNC: 1.14 MG/DL (ref 0.57–1)
DEPRECATED RDW RBC AUTO: 45.2 FL (ref 37–54)
EGFRCR SERPLBLD CKD-EPI 2021: 51.3 ML/MIN/1.73
EOSINOPHIL # BLD AUTO: 0.11 10*3/MM3 (ref 0–0.4)
EOSINOPHIL NFR BLD AUTO: 1.6 % (ref 0.3–6.2)
ERYTHROCYTE [DISTWIDTH] IN BLOOD BY AUTOMATED COUNT: 13.3 % (ref 12.3–15.4)
GLOBULIN UR ELPH-MCNC: 2.6 GM/DL
GLUCOSE SERPL-MCNC: 138 MG/DL (ref 65–99)
HCT VFR BLD AUTO: 37.1 % (ref 34–46.6)
HGB BLD-MCNC: 12.5 G/DL (ref 12–15.9)
HOLD SPECIMEN: NORMAL
HOLD SPECIMEN: NORMAL
IMM GRANULOCYTES # BLD AUTO: 0.01 10*3/MM3 (ref 0–0.05)
IMM GRANULOCYTES NFR BLD AUTO: 0.1 % (ref 0–0.5)
LYMPHOCYTES # BLD AUTO: 2.02 10*3/MM3 (ref 0.7–3.1)
LYMPHOCYTES NFR BLD AUTO: 30.2 % (ref 19.6–45.3)
MAGNESIUM SERPL-MCNC: 2 MG/DL (ref 1.6–2.4)
MCH RBC QN AUTO: 31 PG (ref 26.6–33)
MCHC RBC AUTO-ENTMCNC: 33.7 G/DL (ref 31.5–35.7)
MCV RBC AUTO: 92.1 FL (ref 79–97)
MONOCYTES # BLD AUTO: 0.98 10*3/MM3 (ref 0.1–0.9)
MONOCYTES NFR BLD AUTO: 14.6 % (ref 5–12)
NEUTROPHILS NFR BLD AUTO: 3.55 10*3/MM3 (ref 1.7–7)
NEUTROPHILS NFR BLD AUTO: 53.2 % (ref 42.7–76)
NRBC BLD AUTO-RTO: 0 /100 WBC (ref 0–0.2)
PLATELET # BLD AUTO: 223 10*3/MM3 (ref 140–450)
PMV BLD AUTO: 10.5 FL (ref 6–12)
POTASSIUM SERPL-SCNC: 3.8 MMOL/L (ref 3.5–5.2)
PROT SERPL-MCNC: 6.6 G/DL (ref 6–8.5)
RBC # BLD AUTO: 4.03 10*6/MM3 (ref 3.77–5.28)
SODIUM SERPL-SCNC: 138 MMOL/L (ref 136–145)
TROPONIN T SERPL-MCNC: <0.01 NG/ML (ref 0–0.03)
WBC NRBC COR # BLD: 6.69 10*3/MM3 (ref 3.4–10.8)
WHOLE BLOOD HOLD COAG: NORMAL
WHOLE BLOOD HOLD SPECIMEN: NORMAL

## 2022-05-14 PROCEDURE — 99283 EMERGENCY DEPT VISIT LOW MDM: CPT | Performed by: EMERGENCY MEDICINE

## 2022-05-14 PROCEDURE — 99283 EMERGENCY DEPT VISIT LOW MDM: CPT

## 2022-05-14 PROCEDURE — 84484 ASSAY OF TROPONIN QUANT: CPT | Performed by: EMERGENCY MEDICINE

## 2022-05-14 PROCEDURE — 80053 COMPREHEN METABOLIC PANEL: CPT | Performed by: EMERGENCY MEDICINE

## 2022-05-14 PROCEDURE — 83735 ASSAY OF MAGNESIUM: CPT | Performed by: EMERGENCY MEDICINE

## 2022-05-14 PROCEDURE — 85025 COMPLETE CBC W/AUTO DIFF WBC: CPT | Performed by: EMERGENCY MEDICINE

## 2022-05-14 PROCEDURE — 71045 X-RAY EXAM CHEST 1 VIEW: CPT

## 2022-05-14 PROCEDURE — 93005 ELECTROCARDIOGRAM TRACING: CPT | Performed by: EMERGENCY MEDICINE

## 2022-05-14 PROCEDURE — 93010 ELECTROCARDIOGRAM REPORT: CPT | Performed by: INTERNAL MEDICINE

## 2022-05-14 RX ORDER — SODIUM CHLORIDE 0.9 % (FLUSH) 0.9 %
10 SYRINGE (ML) INJECTION AS NEEDED
Status: DISCONTINUED | OUTPATIENT
Start: 2022-05-14 | End: 2022-05-15 | Stop reason: HOSPADM

## 2022-05-15 LAB — QT INTERVAL: 390 MS

## 2022-05-15 NOTE — ED PROVIDER NOTES
Subjective   71-year-old female presents with palpitations.  Patient states that she has had a few times recently where she will feel like her heart has an extra beat and then a longer pause.  Cannot tell me exactly how often this happens when she does get the episodes but states sometimes she will notice over the course of a few hours.  She denies any and all other associated symptoms.  She has not had any recent chest pain, shortness of breath, dyspnea on exertion, lightheadedness, syncope.  Patient also has history of atrial fibrillation but states when she gets this her heart rate is so fast that she cannot check her pulse and that is not what she is experienced recently.  No recent medication changes.  Patient is established with cardiology.          Review of Systems   All other systems reviewed and are negative.      Past Medical History:   Diagnosis Date   • Acute bacterial conjunctivitis of left eye 10/20/2016   • Allergic rhinitis    • Asthma    • Bronchitis    • Colon polyp    • Hyperlipidemia    • Hypertension    • PAF (paroxysmal atrial fibrillation) ([facility])     one episode, 2015; was briefly treated with Xarelto   • Pneumonia    • Rotator cuff tendonitis    • Type 2 diabetes mellitus ([facility])    • UTI (urinary tract infection)    • Vitamin D deficiency        Allergies   Allergen Reactions   • Penicillins Anaphylaxis and Swelling   • Erythromycin Other (See Comments)     JOINT PAIN AND SWELLING   • Astelin [Azelastine] Cough       Past Surgical History:   Procedure Laterality Date   •  SECTION     • CHOLECYSTECTOMY     • COLONOSCOPY     • EYE SURGERY Bilateral     cataract surgery   • GUM SURGERY  2017   • HYSTERECTOMY     • KNEE SURGERY         Family History   Problem Relation Age of Onset   • Stroke Mother    • Hypertension Mother    • Heart disease Father    • Hypertension Sister    • Colon polyps Sister    • Heart disease Brother    • Cancer Paternal Grandmother         breast   • Breast  cancer Paternal Grandmother    • Heart disease Paternal Grandfather    • Diabetes Paternal Grandfather        Social History     Socioeconomic History   • Marital status:    Tobacco Use   • Smoking status: Never Smoker   • Smokeless tobacco: Never Used   • Tobacco comment: CAFFEINE USE : MINIMAL, ONLY DRINKS COFFEE IN THE WINTER, ONE CUP   Vaping Use   • Vaping Use: Never used   Substance and Sexual Activity   • Alcohol use: No   • Drug use: No   • Sexual activity: Not Currently           Objective   Physical Exam  Constitutional:       General: She is not in acute distress.     Appearance: Normal appearance. She is not ill-appearing, toxic-appearing or diaphoretic.   HENT:      Head: Normocephalic and atraumatic.      Nose: Nose normal.      Mouth/Throat:      Mouth: Mucous membranes are moist.      Pharynx: Oropharynx is clear.   Eyes:      Extraocular Movements: Extraocular movements intact.      Pupils: Pupils are equal, round, and reactive to light.   Cardiovascular:      Rate and Rhythm: Normal rate and regular rhythm.      Pulses: Normal pulses.      Heart sounds: Normal heart sounds.   Pulmonary:      Effort: Pulmonary effort is normal. No respiratory distress.      Breath sounds: Normal breath sounds.   Abdominal:      General: There is no distension.      Palpations: Abdomen is soft.      Tenderness: There is no abdominal tenderness.   Musculoskeletal:         General: No swelling, tenderness, deformity or signs of injury. Normal range of motion.      Right lower leg: No edema.      Left lower leg: No edema.   Skin:     General: Skin is warm and dry.   Neurological:      General: No focal deficit present.      Mental Status: She is alert and oriented to person, place, and time. Mental status is at baseline.   Psychiatric:         Mood and Affect: Mood normal.         Behavior: Behavior normal.         Thought Content: Thought content normal.         Judgment: Judgment normal.         Procedures            ED Course  ED Course as of 05/14/22 2324   Sat May 14, 2022   2322 Patient overall well-appearing.  Work-up here reveals slightly elevated blood pressure, creatinine a bit above patient's baseline, but normal EKG, negative troponin, normal chest x-ray.  Patient has not had any chest pain or shortness of breath throughout these episodes.  She seems to be describing PVCs and is otherwise asymptomatic with them.  She states that they just made her anxious because she did not know what was going on.  She is reassured by her discussion.  Offered patient IV fluids here versus going home and pushing p.o. fluids over the next day or so and she chose discharge.  This seems entirely reasonable to me.  Advised primary care and cardiology follow-up as needed.  Do not feel that further emergency department or inpatient intervention or work-up is indicated at this time. [TD]   2323 EKG obtained at 2212 shows sinus rhythm, rate 67, borderline prolonged PA, normal QTC, no ST segment or T wave changes. [TD]      ED Course User Index  [TD] Gallo Pope MD                                                 [facility]    Final diagnoses:   PVC's (premature ventricular contractions)       ED Disposition  ED Disposition     ED Disposition   Discharge    Condition   Stable    Comment   --             Terra Reddy, APRN  [address]  [address]  [place] [address] [phone]    In 1 week  As needed         Medication List      No changes were made to your prescriptions during this visit.          Gallo Pope MD  05/14/22 2324

## 2022-05-17 NOTE — PROGRESS NOTES
RM:________     PCP: Terra Reddy APRN    : 1950  AGE: 71 y.o.  EST PATIENT   REASON FOR VISIT/  CC:    BP Readings from Last 3 Encounters:   22 149/89   22 130/70   22 118/72        WT: ____________ BP: __________L __________R HR______    CHEST PAIN: _____________    SOA: _____________PALPS: _______________     LIGHTHEADED: ___________FATIGUE: ________________ EDEMA __________    ALLERGIES:Penicillins, Erythromycin, and Astelin [azelastine] SMOKING HISTORY:  Social History     Tobacco Use   • Smoking status: Never Smoker   • Smokeless tobacco: Never Used   • Tobacco comment: CAFFEINE USE : MINIMAL, ONLY DRINKS COFFEE IN THE WINTER, ONE CUP   Vaping Use   • Vaping Use: Never used   Substance Use Topics   • Alcohol use: No   • Drug use: No     CAFFEINE USE_________________  ALCOHOL ______________________    Below is the patient's most recent value for Albumin, ALT, AST, BUN, Calcium, Chloride, Cholesterol, CO2, Creatinine, GFR, Glucose, HDL, Hematocrit, Hemoglobin, Hemoglobin A1C, LDL, Magnesium, Phosphorus, Platelets, Potassium, PSA, Sodium, Triglycerides, TSH and WBC.   Lab Results   Component Value Date    ALBUMIN 4.00 2022    ALT 18 2022    AST 20 2022    BUN 26 (H) 2022    CALCIUM 9.0 2022     2022    CO2 23.8 2022    CREATININE 1.14 (H) 2022    HDL 49 2022    HCT 37.1 2022    HGB 12.5 2022    HGBA1C 6.7 (H) 2022    LDL 72 2022    MG 2.0 2022     2022    K 3.8 2022     2022    TRIG 110 2022    TSH 6.660 (H) 2017    WBC 6.69 2022          NEW DIAGNOSIS/ SURGERY/ HOSP OR ED VISITS: ______________________    __________________________________________________________________      RECENT LABS OR DIAGNOSTIC TESTING:  _____________________________    __________________________________________________________________      ASSESSMENT/ PLAN:  _______________________________________________    __________________________________________________________________

## 2022-05-24 ENCOUNTER — OFFICE VISIT (OUTPATIENT)
Dept: CARDIOLOGY | Facility: CLINIC | Age: 72
End: 2022-05-24

## 2022-05-24 VITALS
DIASTOLIC BLOOD PRESSURE: 80 MMHG | SYSTOLIC BLOOD PRESSURE: 112 MMHG | WEIGHT: 168 LBS | HEIGHT: 61 IN | BODY MASS INDEX: 31.72 KG/M2

## 2022-05-24 DIAGNOSIS — I49.1 PREMATURE ATRIAL COMPLEXES: ICD-10-CM

## 2022-05-24 DIAGNOSIS — R93.89 ABNORMAL X-RAY: ICD-10-CM

## 2022-05-24 DIAGNOSIS — I48.0 PAF (PAROXYSMAL ATRIAL FIBRILLATION): Primary | ICD-10-CM

## 2022-05-24 DIAGNOSIS — I10 PRIMARY HYPERTENSION: ICD-10-CM

## 2022-05-24 DIAGNOSIS — I49.3 PVCS (PREMATURE VENTRICULAR CONTRACTIONS): ICD-10-CM

## 2022-05-24 PROCEDURE — 93000 ELECTROCARDIOGRAM COMPLETE: CPT | Performed by: INTERNAL MEDICINE

## 2022-05-24 PROCEDURE — 99214 OFFICE O/P EST MOD 30 MIN: CPT | Performed by: INTERNAL MEDICINE

## 2022-05-24 NOTE — PROGRESS NOTES
"Date of Office Visit: 2020  Encounter Provider: Keon Mann MD  Place of Service: [facility] CARDIOLOGY  Patient Name: Debbie Cheng  :1950    Chief Complaint   Patient presents with   • Hypertension   :     HPI: Debbie Cheng is a 71 y.o. female who presents today to followup. She is an extremely pleasant lady who had one episode of highly symptomatic atrial fibrillation in May 2015. She converted on her own. She was started on rivaroxaban and metoprolol. In 2015, I stopped the NOAC when it was clear that she had experienced no recurrence.     In 2021, she reported palpitations that sounded like premature ectopics; a Holter showed a 4% burden of of PACs and a 4% burden of PVCs. An echo showed normal LV size and systolic function, mild-moderate LAE, and mild-moderate MR.     She had an increased frequency of palpitations a few weeks ago; they were not consistent with her previous PAF. She felt skips and a pause. She went to the ED, and was noted to have PVCs. She was reassured; her symptoms have improved. She was told that her \"heart was enlarged.\"    She has not chest pain or dyspnea. She has no pedal edema or orthopnea. She has had no syncope.     Past Medical History:   Diagnosis Date   • Acute bacterial conjunctivitis of left eye 10/20/2016   • Allergic rhinitis    • Asthma    • Bronchitis    • Colon polyp    • Hyperlipidemia    • Hypertension    • PAF (paroxysmal atrial fibrillation) ([facility])     one episode, 2015; was briefly treated with Xarelto   • Pneumonia    • Premature atrial complexes 2022   • PVCs (premature ventricular contractions) 2022   • Rotator cuff tendonitis    • Type 2 diabetes mellitus (HCC)    • UTI (urinary tract infection)    • Vitamin D deficiency        Past Surgical History:   Procedure Laterality Date   •  SECTION     • CHOLECYSTECTOMY     • COLONOSCOPY     • EYE SURGERY Bilateral     cataract surgery   • GUM " SURGERY  11/2017   • HYSTERECTOMY     • KNEE SURGERY         Social History     Socioeconomic History   • Marital status:    Tobacco Use   • Smoking status: Never Smoker   • Smokeless tobacco: Never Used   • Tobacco comment: CAFFEINE USE : MINIMAL, ONLY DRINKS COFFEE IN THE WINTER, ONE CUP   Vaping Use   • Vaping Use: Never used   Substance and Sexual Activity   • Alcohol use: No   • Drug use: No   • Sexual activity: Not Currently       Family History   Problem Relation Age of Onset   • Stroke Mother    • Hypertension Mother    • Heart disease Father    • Hypertension Sister    • Colon polyps Sister    • Heart disease Brother    • Cancer Paternal Grandmother         breast   • Breast cancer Paternal Grandmother    • Heart disease Paternal Grandfather    • Diabetes Paternal Grandfather        Review of Systems   Constitutional: Negative for malaise/fatigue.   Cardiovascular: Positive for palpitations. Negative for chest pain.   Respiratory: Negative for shortness of breath.    Musculoskeletal: Positive for joint pain.   Neurological: Negative for light-headedness.   All other systems reviewed and are negative.      Allergies   Allergen Reactions   • Penicillins Anaphylaxis and Swelling   • Erythromycin Other (See Comments)     JOINT PAIN AND SWELLING   • Astelin [Azelastine] Cough         Current Outpatient Medications:   •  fluticasone (FLONASE) 50 MCG/ACT nasal spray, 2 sprays into the nostril(s) as directed by provider Daily. Administer 2 sprays in each nostril for each dose., Disp: , Rfl:   •  levocetirizine (XYZAL) 5 MG tablet, Take 5 mg by mouth Every Morning., Disp: , Rfl:   •  magnesium oxide (MAGOX) 400 (241.3 Mg) MG tablet tablet, Take 400 mg by mouth Daily., Disp: , Rfl:   •  metoprolol tartrate (LOPRESSOR) 25 MG tablet, Take 1 tablet by mouth 2 (Two) Times a Day., Disp: 180 tablet, Rfl: 1  •  montelukast (SINGULAIR) 10 MG tablet, TAKE 1 TABLET BY MOUTH EVERY DAY AT NIGHT, Disp: 90 tablet, Rfl: 3  •  " rivaroxaban (Xarelto) 20 MG tablet, Take 1 tablet by mouth Daily., Disp: 90 tablet, Rfl: 3  •  simvastatin (ZOCOR) 40 MG tablet, TAKE 1 TABLET BY MOUTH EVERY DAY AT NIGHT, Disp: 90 tablet, Rfl: 3  •  valsartan-hydrochlorothiazide (DIOVAN-HCT) 320-12.5 MG per tablet, TAKE 1 TABLET BY MOUTH EVERY DAY, Disp: 90 tablet, Rfl: 3  •  vitamin D (ERGOCALCIFEROL) 1.25 MG (14571 UT) capsule capsule, TAKE 1 CAPSULE BY MOUTH EVERY 7 DAYS, Disp: 12 capsule, Rfl: 3  •  glucose monitor monitoring kit, Use glucose meter to check fasting glucose once daily for diabetes monitoring. E11.9, Disp: 1 each, Rfl: 0  •  Lancets (accu-chek soft touch) lancets, Use one lancet daily to check fasting glucose for diabetes monitoring. e11.9, Disp: 100 each, Rfl: 12  •  OneTouch Ultra test strip, USE ONE TEST STRIP DAILY TO CHECK FASTING GLUCOSE FOR DIABETES MONITORING. E11.9, Disp: 100 each, Rfl: 3  •  VENTOLIN  (90 Base) MCG/ACT inhaler, USE 2 PUFFS EVERY 4-6 HOURS AS NEEDED FOR COUGHING ,WHEEZING OR SHORTNESS OF BREATH., Disp: , Rfl: 3     Objective:     Vitals:    05/24/22 1337 05/24/22 1418   BP: 108/70 112/80   Weight: 76.2 kg (168 lb)    Height: 154.9 cm (61\")      Body mass index is 31.74 kg/m².    Physical Exam  Vitals reviewed.   Constitutional:       Appearance: She is well-developed.   HENT:      Head: Normocephalic.      Nose: Nose normal.      Mouth/Throat:      Comments: masked  Eyes:      Conjunctiva/sclera: Conjunctivae normal.   Neck:      Vascular: No JVD.   Cardiovascular:      Rate and Rhythm: Regular rhythm. Bradycardia present.      Pulses: Normal pulses and intact distal pulses.      Heart sounds: Normal heart sounds. No murmur heard.  Pulmonary:      Effort: Pulmonary effort is normal.      Breath sounds: Normal breath sounds.   Abdominal:      Palpations: Abdomen is soft.      Tenderness: There is no abdominal tenderness.   Musculoskeletal:         General: No swelling. Normal range of motion.      Cervical back: " Normal range of motion.   Lymphadenopathy:      Cervical: No cervical adenopathy.   Skin:     General: Skin is warm and dry.      Findings: No rash.   Neurological:      General: No focal deficit present.      Mental Status: She is alert and oriented to person, place, and time.      Cranial Nerves: No cranial nerve deficit.   Psychiatric:         Mood and Affect: Mood normal.         Behavior: Behavior normal.         Thought Content: Thought content normal.           ECG 12 Lead    Date/Time: 5/24/2022 2:06 PM  Performed by: Keon Mann MD  Authorized by: Keon Mann MD   Comparison: compared with previous ECG   Similar to previous ECG  Rhythm: sinus rhythm  Conduction: conduction normal  ST Segments: ST segments normal  T Waves: T waves normal  QRS axis: normal  Other: no other findings    Clinical impression: normal ECG              Assessment:       Diagnosis Plan   1. PAF (paroxysmal atrial fibrillation) ([facility]CC)  ECG 12 Lead   2. PVCs (premature ventricular contractions)     3. Premature atrial complexes     4. Primary hypertension     5. Abnormal x-ray            Plan:       1.  She had one highly symptomatic AF episode and has had no evidence of recurrence.  She will remain on metoprolol.  As her left atrium is dilated and her CHADSVASC is 3, I have recommended she remain on AC.     2/3.  A monitor in 2021 showed a 4% burden (H) of premature atrial and premature ventricular contractions.  Her palpitations are consistent with PVCs.  They have already started to improve on her own.  She can definitely tell the difference between these and atrial fibrillation.  If these were to happen again, she could take an extra dose of metoprolol or magnesium, and I encouraged her to get up and move around, as increasing her heart rate will often make them go away.  We talked about the generally benign nature of these.    4.  Her BP is within goal.    5.  I personally reviewed her x-ray and I feel that her heart is  normal in size on that study.  She also had an echo last year that revealed no evidence of left ventricular enlargement or LVH.  She did have a little bit of left atrial dilation but it was not severe.    Sincerely,       Keon Mann MD

## 2022-09-16 ENCOUNTER — HOSPITAL ENCOUNTER (EMERGENCY)
Facility: HOSPITAL | Age: 72
Discharge: HOME | End: 2022-09-17
Attending: EMERGENCY MEDICINE | Admitting: EMERGENCY MEDICINE

## 2022-09-16 DIAGNOSIS — M17.12 OSTEOARTHRITIS OF LEFT KNEE, UNSPECIFIED OSTEOARTHRITIS TYPE: Primary | ICD-10-CM

## 2022-09-16 LAB
BASOPHILS # BLD AUTO: 0.02 10*3/MM3 (ref 0–0.2)
BASOPHILS NFR BLD AUTO: 0.3 % (ref 0–1.5)
DEPRECATED RDW RBC AUTO: 44.1 FL (ref 37–54)
EOSINOPHIL # BLD AUTO: 0.13 10*3/MM3 (ref 0–0.4)
EOSINOPHIL NFR BLD AUTO: 2.1 % (ref 0.3–6.2)
ERYTHROCYTE [DISTWIDTH] IN BLOOD BY AUTOMATED COUNT: 13.2 % (ref 12.3–15.4)
HCT VFR BLD AUTO: 37.8 % (ref 34–46.6)
HGB BLD-MCNC: 13 G/DL (ref 12–15.9)
IMM GRANULOCYTES # BLD AUTO: 0.01 10*3/MM3 (ref 0–0.05)
IMM GRANULOCYTES NFR BLD AUTO: 0.2 % (ref 0–0.5)
LYMPHOCYTES # BLD AUTO: 1.82 10*3/MM3 (ref 0.7–3.1)
LYMPHOCYTES NFR BLD AUTO: 29.4 % (ref 19.6–45.3)
MCH RBC QN AUTO: 31.4 PG (ref 26.6–33)
MCHC RBC AUTO-ENTMCNC: 34.4 G/DL (ref 31.5–35.7)
MCV RBC AUTO: 91.3 FL (ref 79–97)
MONOCYTES # BLD AUTO: 0.9 10*3/MM3 (ref 0.1–0.9)
MONOCYTES NFR BLD AUTO: 14.5 % (ref 5–12)
NEUTROPHILS NFR BLD AUTO: 3.32 10*3/MM3 (ref 1.7–7)
NEUTROPHILS NFR BLD AUTO: 53.5 % (ref 42.7–76)
NRBC BLD AUTO-RTO: 0 /100 WBC (ref 0–0.2)
PLATELET # BLD AUTO: 212 10*3/MM3 (ref 140–450)
PMV BLD AUTO: 10.6 FL (ref 6–12)
RBC # BLD AUTO: 4.14 10*6/MM3 (ref 3.77–5.28)
WBC NRBC COR # BLD: 6.2 10*3/MM3 (ref 3.4–10.8)

## 2022-09-16 PROCEDURE — 99283 EMERGENCY DEPT VISIT LOW MDM: CPT

## 2022-09-16 PROCEDURE — 85025 COMPLETE CBC W/AUTO DIFF WBC: CPT | Performed by: EMERGENCY MEDICINE

## 2022-09-16 PROCEDURE — 80053 COMPREHEN METABOLIC PANEL: CPT | Performed by: EMERGENCY MEDICINE

## 2022-09-17 ENCOUNTER — APPOINTMENT (OUTPATIENT)
Dept: GENERAL RADIOLOGY | Facility: HOSPITAL | Age: 72
End: 2022-09-17

## 2022-09-17 VITALS
TEMPERATURE: 98.5 F | HEIGHT: 61 IN | DIASTOLIC BLOOD PRESSURE: 75 MMHG | RESPIRATION RATE: 18 BRPM | BODY MASS INDEX: 32.98 KG/M2 | OXYGEN SATURATION: 94 % | SYSTOLIC BLOOD PRESSURE: 131 MMHG | HEART RATE: 60 BPM | WEIGHT: 174.7 LBS

## 2022-09-17 LAB
ALBUMIN SERPL-MCNC: 4.5 G/DL (ref 3.5–5.2)
ALBUMIN/GLOB SERPL: 1.9 G/DL
ALP SERPL-CCNC: 75 U/L (ref 39–117)
ALT SERPL W P-5'-P-CCNC: 21 U/L (ref 1–33)
ANION GAP SERPL CALCULATED.3IONS-SCNC: 10.6 MMOL/L (ref 5–15)
AST SERPL-CCNC: 24 U/L (ref 1–32)
BILIRUB SERPL-MCNC: 0.6 MG/DL (ref 0–1.2)
BUN SERPL-MCNC: 26 MG/DL (ref 8–23)
BUN/CREAT SERPL: 25.7 (ref 7–25)
CALCIUM SPEC-SCNC: 9.6 MG/DL (ref 8.6–10.5)
CHLORIDE SERPL-SCNC: 104 MMOL/L (ref 98–107)
CO2 SERPL-SCNC: 24.4 MMOL/L (ref 22–29)
CREAT SERPL-MCNC: 1.01 MG/DL (ref 0.57–1)
EGFRCR SERPLBLD CKD-EPI 2021: 59.3 ML/MIN/1.73
GLOBULIN UR ELPH-MCNC: 2.4 GM/DL
GLUCOSE SERPL-MCNC: 130 MG/DL (ref 65–99)
HOLD SPECIMEN: NORMAL
HOLD SPECIMEN: NORMAL
POTASSIUM SERPL-SCNC: 3.9 MMOL/L (ref 3.5–5.2)
PROT SERPL-MCNC: 6.9 G/DL (ref 6–8.5)
SODIUM SERPL-SCNC: 139 MMOL/L (ref 136–145)
WHOLE BLOOD HOLD COAG: NORMAL
WHOLE BLOOD HOLD SPECIMEN: NORMAL

## 2022-09-17 PROCEDURE — 73560 X-RAY EXAM OF KNEE 1 OR 2: CPT

## 2022-09-17 PROCEDURE — 63710000001 PREDNISONE PER 1 MG: Performed by: EMERGENCY MEDICINE

## 2022-09-17 RX ORDER — PREDNISONE 20 MG/1
60 TABLET ORAL DAILY
Qty: 12 TABLET | Refills: 0 | Status: SHIPPED | OUTPATIENT
Start: 2022-09-17 | End: 2022-09-21

## 2022-09-17 RX ORDER — PREDNISONE 20 MG/1
60 TABLET ORAL ONCE
Status: COMPLETED | OUTPATIENT
Start: 2022-09-17 | End: 2022-09-17

## 2022-09-17 RX ADMIN — PREDNISONE 60 MG: 20 TABLET ORAL at 01:12

## 2022-09-17 NOTE — ED TRIAGE NOTES
Pt to ED via PV. Pt c/o left posterior knee pain with radiation to thigh that started at 1200. Pt denies new swelling or SOB. + distal pulse. Pt states attended exercise class today. Pt on xarelto.

## 2022-09-17 NOTE — ED PROVIDER NOTES
Subjective   History of Present Illness  71-year-old female presents with left knee pain.  Pain is to posterior knee.  Pain is primarily present when ambulating.  Does not have pain while at rest when leg is straight.  Does not recall any specific injury.  Patient does note that she did an exercise class this morning and has been on a walk.  This is not unusual for her.  No numbness or tingling.  Pain does not radiate.  Pain is sharp.  Patient otherwise feels well.  She took Tylenol arthritis without relief.  Patient is on Xarelto for atrial fibrillation.  Denies history of DVT or PE.        Review of Systems   All other systems reviewed and are negative.      Past Medical History:   Diagnosis Date   • Acute bacterial conjunctivitis of left eye 10/20/2016   • Allergic rhinitis    • Asthma    • Bronchitis    • Colon polyp    • Hyperlipidemia    • Hypertension    • PAF (paroxysmal atrial fibrillation) ([facility])     one episode, 2015; was briefly treated with Xarelto   • Pneumonia    • Premature atrial complexes 2022   • PVCs (premature ventricular contractions) 2022   • Rotator cuff tendonitis    • Type 2 diabetes mellitus ([facility])    • UTI (urinary tract infection)    • Vitamin D deficiency        Allergies   Allergen Reactions   • Penicillins Anaphylaxis and Swelling   • Erythromycin Other (See Comments)     JOINT PAIN AND SWELLING   • Astelin [Azelastine] Cough       Past Surgical History:   Procedure Laterality Date   •  SECTION     • CHOLECYSTECTOMY     • COLONOSCOPY     • EYE SURGERY Bilateral     cataract surgery   • GUM SURGERY  2017   • HYSTERECTOMY     • KNEE SURGERY         Family History   Problem Relation Age of Onset   • Stroke Mother    • Hypertension Mother    • Heart disease Father    • Hypertension Sister    • Colon polyps Sister    • Heart disease Brother    • Cancer Paternal Grandmother         breast   • Breast cancer Paternal Grandmother    • Heart disease Paternal Grandfather     • Diabetes Paternal Grandfather        Social History     Socioeconomic History   • Marital status:    Tobacco Use   • Smoking status: Never Smoker   • Smokeless tobacco: Never Used   • Tobacco comment: CAFFEINE USE : MINIMAL, ONLY DRINKS COFFEE IN THE WINTER, ONE CUP   Vaping Use   • Vaping Use: Never used   Substance and Sexual Activity   • Alcohol use: No   • Drug use: No   • Sexual activity: Not Currently           Objective   Physical Exam  Constitutional:       General: She is not in acute distress.     Appearance: She is not toxic-appearing.   HENT:      Head: Normocephalic and atraumatic.      Mouth/Throat:      Mouth: Mucous membranes are moist.      Pharynx: Oropharynx is clear.   Eyes:      Extraocular Movements: Extraocular movements intact.      Pupils: Pupils are equal, round, and reactive to light.   Cardiovascular:      Rate and Rhythm: Normal rate and regular rhythm.      Pulses: Normal pulses.   Pulmonary:      Effort: Pulmonary effort is normal. No respiratory distress.   Musculoskeletal:         General: No swelling, tenderness, deformity or signs of injury. Normal range of motion.      Right lower leg: Edema (trace) present.      Left lower leg: Edema (trace) present.   Skin:     General: Skin is warm and dry.      Findings: No erythema.   Neurological:      General: No focal deficit present.      Mental Status: She is alert and oriented to person, place, and time. Mental status is at baseline.      Sensory: No sensory deficit.      Motor: No weakness.   Psychiatric:         Mood and Affect: Mood normal.         Behavior: Behavior normal.         Thought Content: Thought content normal.         Judgment: Judgment normal.         Procedures           ED Course  ED Course as of 09/17/22 0103   Sat Sep 17, 2022   0102 Overall presentation including x-ray findings consistent with osteoarthritis of knee.  Patient has no swelling or tenderness elsewhere in her extremity, she is already  anticoagulated, I do not have suspicion for DVT at this point.  Patient has contraindication for NSAIDs.  Discussed pros and cons of short course of steroids and patient ultimately elected to try this.  We will give first dose of prednisone here, prescribe 4 additional days.  Advised rest, ice.  Advised orthopedic follow-up.  Discussed expected course and return precautions. [TD]      ED Course User Index  [TD] Gallo Pope MD                                           [facility]    Final diagnoses:   Osteoarthritis of left knee, unspecified osteoarthritis type       ED Disposition  ED Disposition     ED Disposition   Discharge    Condition   Stable    Comment   --             Hema Fish MD  [address]  [place] [address] [phone]    In 1 week  As needed         Medication List      New Prescriptions    predniSONE 20 MG tablet  Commonly known as: DELTASONE  Take 3 tablets by mouth Daily for 4 days.           Where to Get Your Medications      These medications were sent to [facility]/pharmacy #[id number] - [facility], [place] - [address] - [phone]  - [phone]   [address] [address]    Phone: [phone]   · predniSONE 20 MG tablet          Gallo Pope MD  09/17/22 0102

## 2022-09-21 ENCOUNTER — TELEPHONE (OUTPATIENT)
Dept: INTERNAL MEDICINE | Facility: CLINIC | Age: 72
End: 2022-09-21

## 2022-09-21 NOTE — TELEPHONE ENCOUNTER
Caller: Debbie Cheng    Relationship: Self    Best call back number: [phone]    Who are you requesting to speak with (clinical staff, provider,  specific staff member):NURSE    What was the call regarding:[employer] MESSAGE    Do you require a callback: YES    PATIENT WAS MESSAGING ON [employer] AND GOT LOCKED OUT. REQUESTING A CALL TO GO OVER MESSAGE THAT WAS SENT ON 9/21

## 2022-10-28 ENCOUNTER — TRANSCRIBE ORDERS (OUTPATIENT)
Dept: ADMINISTRATIVE | Facility: HOSPITAL | Age: 72
End: 2022-10-28

## 2022-10-28 DIAGNOSIS — Z12.31 VISIT FOR SCREENING MAMMOGRAM: Primary | ICD-10-CM

## 2022-11-02 DIAGNOSIS — E78.2 MIXED HYPERLIPIDEMIA: ICD-10-CM

## 2022-11-02 RX ORDER — SIMVASTATIN 40 MG
TABLET ORAL
Qty: 90 TABLET | Refills: 3 | Status: SHIPPED | OUTPATIENT
Start: 2022-11-02

## 2022-11-14 ENCOUNTER — TELEMEDICINE (OUTPATIENT)
Dept: INTERNAL MEDICINE | Facility: CLINIC | Age: 72
End: 2022-11-14

## 2022-11-14 VITALS — WEIGHT: 174 LBS | BODY MASS INDEX: 32.85 KG/M2 | HEIGHT: 61 IN

## 2022-11-14 DIAGNOSIS — U07.1 COVID-19 VIRUS INFECTION: Primary | ICD-10-CM

## 2022-11-14 PROCEDURE — 99213 OFFICE O/P EST LOW 20 MIN: CPT | Performed by: INTERNAL MEDICINE

## 2022-11-14 NOTE — ASSESSMENT & PLAN NOTE
- Honey, humidification, hydration and rest advised  - Discussed that Xarelto and Paxlovid are contraindicated in combination.   - Discussed quarantine parameters   - Given she is fully vaccinated and actively improving, I am hopeful she will do well.  Return precautions discussed of worsening symptoms at day 7 + specifically fever or intractable emesis

## 2022-11-14 NOTE — PROGRESS NOTES
Debbie Cheng is a 72 y.o. female who presents with a chief complaint of   Chief Complaint   Patient presents with   • Covid-19 Home Monitoring Video Visit       HPI     Positive on 11/13.  Head congestion, ear fullness, body aches.  No fevers (Tmax 99.6^F).  Is up to date on her COVID vaccines and flu shots.     The following portions of the patient's history were reviewed and updated as appropriate: allergies, current medications, past family history, past medical history, past social history, past surgical history and problem list.      Current Outpatient Medications:   •  fluticasone (FLONASE) 50 MCG/ACT nasal spray, 2 sprays into the nostril(s) as directed by provider Daily. Administer 2 sprays in each nostril for each dose., Disp: , Rfl:   •  glucose monitor monitoring kit, Use glucose meter to check fasting glucose once daily for diabetes monitoring. E11.9, Disp: 1 each, Rfl: 0  •  Lancets (accu-chek soft touch) lancets, Use one lancet daily to check fasting glucose for diabetes monitoring. e11.9, Disp: 100 each, Rfl: 12  •  levocetirizine (XYZAL) 5 MG tablet, Take 5 mg by mouth Every Morning., Disp: , Rfl:   •  magnesium oxide (MAGOX) 400 (241.3 Mg) MG tablet tablet, Take 400 mg by mouth Daily., Disp: , Rfl:   •  metoprolol tartrate (LOPRESSOR) 25 MG tablet, TAKE 1 TABLET BY MOUTH TWICE A DAY, Disp: 180 tablet, Rfl: 1  •  montelukast (SINGULAIR) 10 MG tablet, TAKE 1 TABLET BY MOUTH EVERY DAY AT NIGHT, Disp: 90 tablet, Rfl: 3  •  OneTouch Ultra test strip, USE ONE TEST STRIP DAILY TO CHECK FASTING GLUCOSE FOR DIABETES MONITORING. E11.9, Disp: 100 each, Rfl: 3  •  rivaroxaban (Xarelto) 20 MG tablet, Take 1 tablet by mouth Daily., Disp: 90 tablet, Rfl: 3  •  simvastatin (ZOCOR) 40 MG tablet, TAKE 1 TABLET BY MOUTH EVERY DAY AT NIGHT, Disp: 90 tablet, Rfl: 3  •  valsartan-hydrochlorothiazide (DIOVAN-HCT) 320-12.5 MG per tablet, TAKE 1 TABLET BY MOUTH EVERY DAY, Disp: 90 tablet, Rfl: 3  •  VENTOLIN   "(90 Base) MCG/ACT inhaler, USE 2 PUFFS EVERY 4-6 HOURS AS NEEDED FOR COUGHING ,WHEEZING OR SHORTNESS OF BREATH., Disp: , Rfl: 3  •  vitamin D (ERGOCALCIFEROL) 1.25 MG (03072 UT) capsule capsule, TAKE 1 CAPSULE BY MOUTH EVERY 7 DAYS, Disp: 12 capsule, Rfl: 3            Physical Exam  Ht 154.9 cm (61\")   Wt 78.9 kg (174 lb)   LMP  (LMP Unknown)   BMI 32.88 kg/m²     This was a telehealth visit with audio and visual services. The patient was located in her home and I was in my office at .  The visit lasted 9 minutes.            Diagnoses and all orders for this visit:    1. COVID-19 virus infection (Primary)  Assessment & Plan:  - Honey, humidification, hydration and rest advised  - Discussed that Xarelto and Paxlovid are contraindicated in combination.   - Discussed quarantine parameters   - Given she is fully vaccinated and actively improving, I am hopeful she will do well.  Return precautions discussed of worsening symptoms at day 7 + specifically fever or intractable emesis      "

## 2022-11-18 ENCOUNTER — OFFICE VISIT (OUTPATIENT)
Dept: INTERNAL MEDICINE | Facility: CLINIC | Age: 72
End: 2022-11-18

## 2022-11-18 VITALS
RESPIRATION RATE: 18 BRPM | SYSTOLIC BLOOD PRESSURE: 110 MMHG | WEIGHT: 172 LBS | OXYGEN SATURATION: 97 % | HEART RATE: 70 BPM | DIASTOLIC BLOOD PRESSURE: 66 MMHG | HEIGHT: 61 IN | BODY MASS INDEX: 32.47 KG/M2 | TEMPERATURE: 97.3 F

## 2022-11-18 DIAGNOSIS — E78.2 MIXED HYPERLIPIDEMIA: ICD-10-CM

## 2022-11-18 DIAGNOSIS — I10 PRIMARY HYPERTENSION: ICD-10-CM

## 2022-11-18 DIAGNOSIS — Z00.00 MEDICARE ANNUAL WELLNESS VISIT, SUBSEQUENT: Primary | ICD-10-CM

## 2022-11-18 DIAGNOSIS — E11.65 UNCONTROLLED TYPE 2 DIABETES MELLITUS WITH HYPERGLYCEMIA: ICD-10-CM

## 2022-11-18 DIAGNOSIS — E11.9 CONTROLLED TYPE 2 DIABETES MELLITUS WITHOUT COMPLICATION, WITHOUT LONG-TERM CURRENT USE OF INSULIN: ICD-10-CM

## 2022-11-18 LAB
POC CREATININE URINE: NORMAL
POC MICROALBUMIN URINE: NORMAL

## 2022-11-18 PROCEDURE — 82044 UR ALBUMIN SEMIQUANTITATIVE: CPT | Performed by: NURSE PRACTITIONER

## 2022-11-18 PROCEDURE — 99213 OFFICE O/P EST LOW 20 MIN: CPT | Performed by: NURSE PRACTITIONER

## 2022-11-18 PROCEDURE — 1160F RVW MEDS BY RX/DR IN RCRD: CPT | Performed by: NURSE PRACTITIONER

## 2022-11-18 PROCEDURE — 1170F FXNL STATUS ASSESSED: CPT | Performed by: NURSE PRACTITIONER

## 2022-11-18 PROCEDURE — G0439 PPPS, SUBSEQ VISIT: HCPCS | Performed by: NURSE PRACTITIONER

## 2022-11-18 RX ORDER — METFORMIN HYDROCHLORIDE 500 MG/1
500 TABLET, EXTENDED RELEASE ORAL
Qty: 90 TABLET | Refills: 1 | Status: SHIPPED | OUTPATIENT
Start: 2022-11-18 | End: 2023-03-02

## 2022-11-18 NOTE — PROGRESS NOTES
The ABCs of the Annual Wellness Visit  Subsequent Medicare Wellness Visit    Chief Complaint   Patient presents with   • Medicare Wellness-subsequent      Subjective    History of Present Illness:  Debbie Cheng is a 72 y.o. female who presents for a Subsequent Medicare Wellness Visit.    She will also need a f/u on Type 2 DM, PAF, HTN, Hyperlipidemia. and IFG. She is followed by Johnathan Ervin for PAF. She is on Xarelto 20mg daily. She is a diet controlled diabetic. She does not check her glucose regularly, but usually 120-130s PP. She took prednisone since last visit and reports that it was per ER for knee pain.      She is on Diovan /12.5 mg and Lopressor 25mg BID for HTN and rate control. She is tolerating it well. She has been doing Atif Chi and bicycling on a tricycle.     She is on Zocor 40mg nightly for cholesterol. She tolerates this well.      She has been having seasonal allergies. She is on Xyzal 5mg, Singulair 10mg and Flonase.    The following portions of the patient's history were reviewed and   updated as appropriate: allergies, current medications, past family history, past medical history, past social history, past surgical history and problem list.    Compared to one year ago, the patient feels her physical   health is the same.    Compared to one year ago, the patient feels her mental   health is the same.    Recent Hospitalizations:  She was not admitted to the hospital      Current Medical Providers:  Patient Care Team:  Terra Reddy APRN as PCP - General (Family Medicine)  Trace Onofre MD as Consulting Physician (Ophthalmology)  Keon Mann MD as Consulting Physician (Cardiology)  Parrish Oliver MD as Consulting Physician (Orthopedic Surgery)  Emily Aranda MD as Consulting Physician (Dermatology)    Outpatient Medications Prior to Visit   Medication Sig Dispense Refill   • fluticasone (FLONASE) 50 MCG/ACT nasal spray 2 sprays into the nostril(s) as directed by  provider Daily. Administer 2 sprays in each nostril for each dose.     • glucose monitor monitoring kit Use glucose meter to check fasting glucose once daily for diabetes monitoring. E11.9 1 each 0   • Lancets (accu-chek soft touch) lancets Use one lancet daily to check fasting glucose for diabetes monitoring. e11.9 100 each 12   • levocetirizine (XYZAL) 5 MG tablet Take 5 mg by mouth Every Morning.     • magnesium oxide (MAGOX) 400 (241.3 Mg) MG tablet tablet Take 400 mg by mouth Daily.     • metoprolol tartrate (LOPRESSOR) 25 MG tablet TAKE 1 TABLET BY MOUTH TWICE A  tablet 1   • montelukast (SINGULAIR) 10 MG tablet TAKE 1 TABLET BY MOUTH EVERY DAY AT NIGHT 90 tablet 3   • OneTouch Ultra test strip USE ONE TEST STRIP DAILY TO CHECK FASTING GLUCOSE FOR DIABETES MONITORING. E11.9 100 each 3   • rivaroxaban (Xarelto) 20 MG tablet Take 1 tablet by mouth Daily. 90 tablet 3   • simvastatin (ZOCOR) 40 MG tablet TAKE 1 TABLET BY MOUTH EVERY DAY AT NIGHT 90 tablet 3   • valsartan-hydrochlorothiazide (DIOVAN-HCT) 320-12.5 MG per tablet TAKE 1 TABLET BY MOUTH EVERY DAY 90 tablet 3   • VENTOLIN  (90 Base) MCG/ACT inhaler USE 2 PUFFS EVERY 4-6 HOURS AS NEEDED FOR COUGHING ,WHEEZING OR SHORTNESS OF BREATH.  3   • vitamin D (ERGOCALCIFEROL) 1.25 MG (87102 UT) capsule capsule TAKE 1 CAPSULE BY MOUTH EVERY 7 DAYS 12 capsule 3     No facility-administered medications prior to visit.       No opioid medication identified on active medication list. I have reviewed chart for other potential  high risk medication/s and harmful drug interactions in the elderly.          Aspirin is not on active medication list.  Aspirin use is contraindicated for this patient due to: current use of Xarelto.  .    Patient Active Problem List   Diagnosis   • PAF (paroxysmal atrial fibrillation) (HCC)   • Hypertension   • Vitamin D deficiency   • Controlled type 2 diabetes mellitus without complication, without long-term current use of  "insulin (HCC)   • Medicare annual wellness visit, subsequent   • Hyperlipidemia   • Eczema   • Anemia of unknown etiology   • Postmenopausal   • Varicose veins of both lower extremities with pain   • Family history of renal cancer   • Squamous cell carcinoma in situ (SCCIS) of skin of left upper arm   • Environmental allergies   • Onychia of toe of left foot   • PVCs (premature ventricular contractions)   • Premature atrial complexes   • COVID-19 virus infection     Advance Care Planning  Advance Directive is not on file.  ACP discussion was held with the patient during this visit. Patient has an advance directive (not in EMR), copy requested.          Objective    Vitals:    11/18/22 1404   BP: 110/66   BP Location: Left arm   Patient Position: Sitting   Cuff Size: Adult   Pulse: 70   Resp: 18   Temp: 97.3 °F (36.3 °C)   TempSrc: Infrared   SpO2: 97%   Weight: 78 kg (172 lb)   Height: 154.9 cm (61\")     Estimated body mass index is 32.5 kg/m² as calculated from the following:    Height as of this encounter: 154.9 cm (61\").    Weight as of this encounter: 78 kg (172 lb).    BMI is >= 30 and <35. (Class 1 Obesity). The following options were offered after discussion;: weight loss educational material (shared in after visit summary), exercise counseling/recommendations and nutrition counseling/recommendations      Does the patient have evidence of cognitive impairment? No    Physical Exam  Lab Results   Component Value Date    CHLPL 160 11/11/2022    TRIG 172 (H) 11/11/2022    HDL 50 11/11/2022    LDL 81 11/11/2022    VLDL 29 11/11/2022    HGBA1C 7.30 (H) 11/11/2022            HEALTH RISK ASSESSMENT    Smoking Status:  Social History     Tobacco Use   Smoking Status Never   Smokeless Tobacco Never   Tobacco Comments    CAFFEINE USE : MINIMAL, ONLY DRINKS COFFEE IN THE WINTER, ONE CUP     Alcohol Consumption:  Social History     Substance and Sexual Activity   Alcohol Use No     Fall Risk Screen:    BELENADI Fall Risk " Assessment was completed, and patient is at HIGH risk for falls. Assessment completed on:11/18/2022    Depression Screening:  PHQ-2/PHQ-9 Depression Screening 11/18/2022   Retired PHQ-9 Total Score -   Retired Total Score -   Little Interest or Pleasure in Doing Things 0-->not at all   Feeling Down, Depressed or Hopeless 0-->not at all   PHQ-9: Brief Depression Severity Measure Score 0       Health Habits and Functional and Cognitive Screening:  Functional & Cognitive Status 11/18/2022   Do you have difficulty preparing food and eating? No   Do you have difficulty bathing yourself, getting dressed or grooming yourself? No   Do you have difficulty using the toilet? No   Do you have difficulty moving around from place to place? No   Do you have trouble with steps or getting out of a bed or a chair? No   Current Diet Well Balanced Diet   Dental Exam Up to date   Eye Exam Up to date   Exercise (times per week) 0 times per week   Current Exercises Include Atif Chi   Current Exercise Activities Include -   Do you need help using the phone?  No   Are you deaf or do you have serious difficulty hearing?  Yes   Do you need help with transportation? No   Do you need help shopping? No   Do you need help preparing meals?  No   Do you need help with housework?  No   Do you need help with laundry? No   Do you need help taking your medications? No   Do you need help managing money? No   Do you ever drive or ride in a car without wearing a seat belt? No   Have you felt unusual stress, anger or loneliness in the last month? No   Who do you live with? Spouse   If you need help, do you have trouble finding someone available to you? No   Have you been bothered in the last four weeks by sexual problems? No   Do you have difficulty concentrating, remembering or making decisions? No       Age-appropriate Screening Schedule:  Refer to the list below for future screening recommendations based on patient's age, sex and/or medical conditions.  Orders for these recommended tests are listed in the plan section. The patient has been provided with a written plan.    Health Maintenance   Topic Date Due   • ZOSTER VACCINE (2 of 3) 02/26/2011   • DIABETIC EYE EXAM  03/08/2020   • URINE MICROALBUMIN  09/14/2022   • HEMOGLOBIN A1C  05/11/2023   • DXA SCAN  10/25/2023   • LIPID PANEL  11/11/2023   • MAMMOGRAM  11/26/2023   • TDAP/TD VACCINES (4 - Td or Tdap) 08/26/2026   • INFLUENZA VACCINE  Completed   • DIABETIC FOOT EXAM  Discontinued              Assessment & Plan   CMS Preventative Services Quick Reference  Risk Factors Identified During Encounter  Cardiovascular Disease  Glaucoma or Family History of Glaucoma  Immunizations Discussed/Encouraged (specific Immunizations; vaccines up to date  Inadequate Social Support, Isolation, Loneliness, Lack of Transportation, Financial Difficulties, or Caregiver Stress   Obesity/Overweight   The above risks/problems have been discussed with the patient.  Follow up actions/plans if indicated are seen below in the Assessment/Plan Section.  Pertinent information has been shared with the patient in the After Visit Summary.     Diagnosis Plan   1. Medicare annual wellness visit, subsequent  POCT microalbumin      2. Uncontrolled type 2 diabetes mellitus with hyperglycemia (HCC)  metFORMIN ER (GLUCOPHAGE-XR) 500 MG 24 hr tablet    CBC & Differential    Comprehensive Metabolic Panel    Lipid Panel With / Chol / HDL Ratio    Hemoglobin A1c      3. Mixed hyperlipidemia  Comprehensive Metabolic Panel    Lipid Panel With / Chol / HDL Ratio      4. Primary hypertension  Comprehensive Metabolic Panel    Lipid Panel With / Chol / HDL Ratio        Bring in a glucose log in 3 months. Check a fasting glucose every morning. Resume low carb diet.       Follow Up:      3 months w labs    An After Visit Summary and PPPS were made available to the patient.

## 2022-11-22 ENCOUNTER — OFFICE VISIT (OUTPATIENT)
Dept: CARDIOLOGY | Facility: CLINIC | Age: 72
End: 2022-11-22

## 2022-11-22 VITALS
RESPIRATION RATE: 16 BRPM | HEIGHT: 61 IN | DIASTOLIC BLOOD PRESSURE: 70 MMHG | WEIGHT: 170 LBS | BODY MASS INDEX: 32.1 KG/M2 | OXYGEN SATURATION: 94 % | SYSTOLIC BLOOD PRESSURE: 112 MMHG | HEART RATE: 55 BPM

## 2022-11-22 DIAGNOSIS — I48.0 PAF (PAROXYSMAL ATRIAL FIBRILLATION): Primary | ICD-10-CM

## 2022-11-22 DIAGNOSIS — I49.3 PVCS (PREMATURE VENTRICULAR CONTRACTIONS): ICD-10-CM

## 2022-11-22 DIAGNOSIS — E78.2 MIXED HYPERLIPIDEMIA: ICD-10-CM

## 2022-11-22 DIAGNOSIS — I10 PRIMARY HYPERTENSION: ICD-10-CM

## 2022-11-22 PROCEDURE — 99214 OFFICE O/P EST MOD 30 MIN: CPT | Performed by: NURSE PRACTITIONER

## 2022-11-22 PROCEDURE — 93000 ELECTROCARDIOGRAM COMPLETE: CPT | Performed by: NURSE PRACTITIONER

## 2022-11-22 NOTE — PROGRESS NOTES
"  Date of Office Visit: 2022  Encounter Provider: LOUIS Elmore  Place of Service: [facility] CARDIOLOGY  Patient Name: Debbie Cheng  :1950  Primary Cardiologist: Dr. Mann    CC:  6 month follow up    Dear Terra    HPI: Debbie Cheng is a pleasant 72 y.o. female who presents 2022 for cardiac follow up.  I reviewed her past medical records including notes, labs and testing in preparation for today's visit.    She is an extremely pleasant lady who had one episode of highly symptomatic atrial fibrillation in May 2015. She converted on her own. She was started on rivaroxaban and metoprolol. In 2015, I stopped the NOAC when it was clear that she had experienced no recurrence.      In 2021, she reported palpitations that sounded like premature ectopics; a Holter showed a 4% burden of of PACs and a 4% burden of PVCs. An echo showed normal LV size and systolic function, mild-moderate LAE, and mild-moderate MR.      She saw Dr. Mann 2022 and stated she had had an increased frequency of palpitations a few weeks ago; they were not consistent with her previous PAF. She felt skips and a pause. She went to the ED, and was noted to have PVCs. She was reassured; her symptoms have improved. She was told that her \"heart was enlarged.\"     She returns today for 6-month follow-up.  She states she did have COVID in early November.  Other than that she has felt quite well.  She denies any palpitations, shortness of breath, lower extremity edema.  She has not had any chest pain or chest pressure or fatigue.  She is taking her medications as directed.  Her blood pressure is well controlled.  She did have labs performed 2022 that showed CMP with glucose 123 with normal electrolytes and liver function test.  Creatinine noted at 0.94.  Hemoglobin A1c 7.30.  Lipid profile showed total cholesterol 160, HDL 50, LDL 81 and triglycerides 172.  A CBC unremarkable.  Past Medical " History:   Diagnosis Date   • Acute bacterial conjunctivitis of left eye 10/20/2016   • Allergic rhinitis    • Asthma    • Bronchitis    • Colon polyp    • Hyperlipidemia    • Hypertension    • PAF (paroxysmal atrial fibrillation) (HCC)     one episode, 2015; was briefly treated with Xarelto   • Pneumonia    • Premature atrial complexes 2022   • PVCs (premature ventricular contractions) 2022   • Rotator cuff tendonitis    • Type 2 diabetes mellitus (HCC)    • UTI (urinary tract infection)    • Vitamin D deficiency        Past Surgical History:   Procedure Laterality Date   •  SECTION     • CHOLECYSTECTOMY     • COLONOSCOPY     • EYE SURGERY Bilateral     cataract surgery   • GUM SURGERY  2017   • HYSTERECTOMY     • KNEE SURGERY         Social History     Socioeconomic History   • Marital status:    Tobacco Use   • Smoking status: Never   • Smokeless tobacco: Never   • Tobacco comments:     CAFFEINE daily    Vaping Use   • Vaping Use: Never used   Substance and Sexual Activity   • Alcohol use: No   • Drug use: No   • Sexual activity: Not Currently       Family History   Problem Relation Age of Onset   • Stroke Mother    • Hypertension Mother    • Heart disease Father    • Hypertension Sister    • Colon polyps Sister    • Heart disease Brother    • Cancer Paternal Grandmother         breast   • Breast cancer Paternal Grandmother    • Heart disease Paternal Grandfather    • Diabetes Paternal Grandfather        The following portion of the patient's history were reviewed and updated as appropriate: past medical history, past surgical history, past social history, past family history, allergies, current medications, and problem list.    Review of Systems   Constitutional: Negative for diaphoresis, fever and malaise/fatigue.   HENT: Negative for congestion, hearing loss, hoarse voice, nosebleeds and sore throat.    Eyes: Negative for photophobia, vision loss in left eye, vision loss in right  eye and visual disturbance.   Cardiovascular: Positive for palpitations (ocassionally). Negative for chest pain, dyspnea on exertion, irregular heartbeat, leg swelling, near-syncope, orthopnea, paroxysmal nocturnal dyspnea and syncope.   Respiratory: Negative for cough, hemoptysis, shortness of breath, sleep disturbances due to breathing, snoring, sputum production and wheezing.    Endocrine: Negative for cold intolerance, heat intolerance, polydipsia, polyphagia and polyuria.   Hematologic/Lymphatic: Negative for bleeding problem. Does not bruise/bleed easily.   Skin: Negative for color change, dry skin, poor wound healing, rash and suspicious lesions.   Musculoskeletal: Negative for arthritis, back pain, falls, gout, joint pain, joint swelling, muscle cramps, muscle weakness and myalgias.   Gastrointestinal: Negative for bloating, abdominal pain, constipation, diarrhea, dysphagia, melena, nausea and vomiting.   Neurological: Positive for dizziness (occ when had covid). Negative for excessive daytime sleepiness, headaches, light-headedness, loss of balance, numbness, paresthesias, seizures, vertigo and weakness.   Psychiatric/Behavioral: Negative for depression, memory loss and substance abuse. The patient is not nervous/anxious.        Allergies   Allergen Reactions   • Penicillins Anaphylaxis and Swelling   • Erythromycin Other (See Comments)     JOINT PAIN AND SWELLING   • Astelin [Azelastine] Cough         Current Outpatient Medications:   •  fluticasone (FLONASE) 50 MCG/ACT nasal spray, 2 sprays into the nostril(s) as directed by provider Daily. Administer 2 sprays in each nostril for each dose., Disp: , Rfl:   •  glucose blood (OneTouch Ultra) test strip, 1 each by Other route Daily. Use as instructed, Disp: 100 each, Rfl: 3  •  glucose monitor monitoring kit, Use glucose meter to check fasting glucose once daily for diabetes monitoring. E11.9, Disp: 1 each, Rfl: 0  •  Lancets (accu-chek soft touch) lancets,  "Use one lancet daily to check fasting glucose for diabetes monitoring. e11.9, Disp: 100 each, Rfl: 12  •  levocetirizine (XYZAL) 5 MG tablet, Take 5 mg by mouth Every Morning., Disp: , Rfl:   •  magnesium oxide (MAGOX) 400 (241.3 Mg) MG tablet tablet, Take 400 mg by mouth Daily., Disp: , Rfl:   •  metFORMIN ER (GLUCOPHAGE-XR) 500 MG 24 hr tablet, Take 1 tablet by mouth Daily With Breakfast., Disp: 90 tablet, Rfl: 1  •  metoprolol tartrate (LOPRESSOR) 25 MG tablet, TAKE 1 TABLET BY MOUTH TWICE A DAY, Disp: 180 tablet, Rfl: 1  •  montelukast (SINGULAIR) 10 MG tablet, TAKE 1 TABLET BY MOUTH EVERY DAY AT NIGHT, Disp: 90 tablet, Rfl: 3  •  rivaroxaban (Xarelto) 20 MG tablet, Take 1 tablet by mouth Daily., Disp: 90 tablet, Rfl: 3  •  simvastatin (ZOCOR) 40 MG tablet, TAKE 1 TABLET BY MOUTH EVERY DAY AT NIGHT, Disp: 90 tablet, Rfl: 3  •  valsartan-hydrochlorothiazide (DIOVAN-HCT) 320-12.5 MG per tablet, TAKE 1 TABLET BY MOUTH EVERY DAY, Disp: 90 tablet, Rfl: 3  •  VENTOLIN  (90 Base) MCG/ACT inhaler, USE 2 PUFFS EVERY 4-6 HOURS AS NEEDED FOR COUGHING ,WHEEZING OR SHORTNESS OF BREATH., Disp: , Rfl: 3  •  vitamin D (ERGOCALCIFEROL) 1.25 MG (93072 UT) capsule capsule, TAKE 1 CAPSULE BY MOUTH EVERY 7 DAYS, Disp: 12 capsule, Rfl: 3        Objective:     Vitals:    11/22/22 1042   BP: 112/70   Pulse: 55   Resp: 16   SpO2: 94%   Weight: 77.1 kg (170 lb)   Height: 154.9 cm (61\")     Body mass index is 32.12 kg/m².      Vitals reviewed.   Constitutional:       General: Not in acute distress.     Appearance: Well-developed and not in distress.   Eyes:      General:         Right eye: No discharge.         Left eye: No discharge.      Conjunctiva/sclera: Conjunctivae normal.   HENT:      Head: Normocephalic and atraumatic.      Right Ear: External ear normal.      Left Ear: External ear normal.      Nose: Nose normal.   Neck:      Thyroid: No thyromegaly.      Vascular: No JVD.      Trachea: No tracheal deviation.      " Lymphadenopathy: No cervical adenopathy.   Pulmonary:      Effort: Pulmonary effort is normal. No respiratory distress.      Breath sounds: Normal breath sounds. No wheezing. No rales.   Chest:      Chest wall: Not tender to palpatation.   Cardiovascular:      Normal rate. Regular rhythm.      No gallop.   Pulses:     Intact distal pulses.   Edema:     Peripheral edema absent.   Abdominal:      General: There is no distension.      Palpations: Abdomen is soft.      Tenderness: There is no abdominal tenderness.   Musculoskeletal: Normal range of motion.         General: No tenderness or deformity.      Cervical back: Normal range of motion and neck supple. Skin:     General: Skin is warm and dry.      Findings: No erythema or rash.   Neurological:      Mental Status: Alert and oriented to person, place, and time.      Coordination: Coordination normal.   Psychiatric:         Attention and Perception: Attention normal.         Mood and Affect: Mood normal.         Behavior: Behavior normal.         Thought Content: Thought content normal.         Cognition and Memory: Cognition normal.         Judgment: Judgment normal.               ECG 12 Lead    Date/Time: 11/22/2022 10:53 AM  Performed by: Ewa Yi APRN  Authorized by: Ewa Yi APRN   Comparison: compared with previous ECG from 5/24/2022  Similar to previous ECG  Rhythm: sinus rhythm  Rate: normal  Conduction: conduction normal  ST Segments: ST segments normal  T Waves: T waves normal  QRS axis: normal  Other findings: low voltage    Clinical impression: normal ECG              Assessment:       Diagnosis Plan   1. PAF (paroxysmal atrial fibrillation) (HCC)        2. PVCs (premature ventricular contractions)        3. Mixed hyperlipidemia        4. Primary hypertension               Plan:     1.  She had one highly symptomatic AF episode and has had no evidence of recurrence.  She will remain on metoprolol.  As her left atrium is dilated and her  "KARINADSVASC is 3, she is on Xarelto without any unexplained bleeding, dark or tarry stool.  She feels occasional fluttering.  She had an echo 2021 that revealed no evidence of left ventricular enlargement or LVH.  She did have a little bit of left atrial dilation but it was not severe.     2/3.  A monitor in 2021 showed a 4% burden (H) of premature atrial and premature ventricular contractions.  Her palpitations are consistent with PVCs.  They have already started to improve on her own.  She can definitely tell the difference between these and atrial fibrillation.  If these were to happen again, she could take an extra dose of metoprolol or magnesium. Occasional \"flutter\"     4.  BP well controlled     She had vascular screening in 2017 and questioned having that rechecked.  We have given her an order sheet for [employer].      RTO in 1 year with [facility]    As always, it has been a pleasure to participate in your patient's care. Thank you.       Sincerely,       LOUIS Elmore      Current Outpatient Medications:   •  fluticasone (FLONASE) 50 MCG/ACT nasal spray, 2 sprays into the nostril(s) as directed by provider Daily. Administer 2 sprays in each nostril for each dose., Disp: , Rfl:   •  glucose blood (OneTouch Ultra) test strip, 1 each by Other route Daily. Use as instructed, Disp: 100 each, Rfl: 3  •  glucose monitor monitoring kit, Use glucose meter to check fasting glucose once daily for diabetes monitoring. E11.9, Disp: 1 each, Rfl: 0  •  Lancets (accu-chek soft touch) lancets, Use one lancet daily to check fasting glucose for diabetes monitoring. e11.9, Disp: 100 each, Rfl: 12  •  levocetirizine (XYZAL) 5 MG tablet, Take 5 mg by mouth Every Morning., Disp: , Rfl:   •  magnesium oxide (MAGOX) 400 (241.3 Mg) MG tablet tablet, Take 400 mg by mouth Daily., Disp: , Rfl:   •  metFORMIN ER (GLUCOPHAGE-XR) 500 MG 24 hr tablet, Take 1 tablet by mouth Daily With Breakfast., Disp: 90 tablet, Rfl: 1  •  metoprolol " tartrate (LOPRESSOR) 25 MG tablet, TAKE 1 TABLET BY MOUTH TWICE A DAY, Disp: 180 tablet, Rfl: 1  •  montelukast (SINGULAIR) 10 MG tablet, TAKE 1 TABLET BY MOUTH EVERY DAY AT NIGHT, Disp: 90 tablet, Rfl: 3  •  rivaroxaban (Xarelto) 20 MG tablet, Take 1 tablet by mouth Daily., Disp: 90 tablet, Rfl: 3  •  simvastatin (ZOCOR) 40 MG tablet, TAKE 1 TABLET BY MOUTH EVERY DAY AT NIGHT, Disp: 90 tablet, Rfl: 3  •  valsartan-hydrochlorothiazide (DIOVAN-HCT) 320-12.5 MG per tablet, TAKE 1 TABLET BY MOUTH EVERY DAY, Disp: 90 tablet, Rfl: 3  •  VENTOLIN  (90 Base) MCG/ACT inhaler, USE 2 PUFFS EVERY 4-6 HOURS AS NEEDED FOR COUGHING ,WHEEZING OR SHORTNESS OF BREATH., Disp: , Rfl: 3  •  vitamin D (ERGOCALCIFEROL) 1.25 MG (85814 UT) capsule capsule, TAKE 1 CAPSULE BY MOUTH EVERY 7 DAYS, Disp: 12 capsule, Rfl: 3      Dictated utilizing Dragon dictation

## 2022-12-01 ENCOUNTER — HOSPITAL ENCOUNTER (OUTPATIENT)
Dept: MAMMOGRAPHY | Facility: HOSPITAL | Age: 72
Discharge: HOME | End: 2022-12-01
Admitting: NURSE PRACTITIONER

## 2022-12-01 DIAGNOSIS — Z12.31 VISIT FOR SCREENING MAMMOGRAM: ICD-10-CM

## 2022-12-01 PROCEDURE — 77063 BREAST TOMOSYNTHESIS BI: CPT

## 2022-12-01 PROCEDURE — 77067 SCR MAMMO BI INCL CAD: CPT

## 2022-12-15 DIAGNOSIS — I10 ESSENTIAL HYPERTENSION: ICD-10-CM

## 2022-12-15 RX ORDER — VALSARTAN AND HYDROCHLOROTHIAZIDE 320; 12.5 MG/1; MG/1
TABLET, FILM COATED ORAL
Qty: 90 TABLET | Refills: 3 | Status: SHIPPED | OUTPATIENT
Start: 2022-12-15

## 2022-12-15 NOTE — TELEPHONE ENCOUNTER
Rx Refill Note  Requested Prescriptions     Pending Prescriptions Disp Refills    valsartan-hydrochlorothiazide (DIOVAN-HCT) 320-12.5 MG per tablet [Pharmacy Med Name: VALSARTAN-HCTZ 320-12.5 MG TAB] 90 tablet 3     Sig: TAKE 1 TABLET BY MOUTH EVERY DAY      Last office visit with prescribing clinician: 11/18/2022   Last telemedicine visit with prescribing clinician: 2/16/2023   Next office visit with prescribing clinician: 2/20/2023                         Would you like a call back once the refill request has been completed: [] Yes [] No    If the office needs to give you a call back, can they leave a voicemail: [] Yes [] No    Nadine Land MA  12/15/22, 09:53 EST

## 2022-12-30 DIAGNOSIS — Z80.41 FH: OVARIAN CANCER: Primary | ICD-10-CM

## 2023-01-01 DIAGNOSIS — E55.9 VITAMIN D DEFICIENCY: ICD-10-CM

## 2023-01-03 ENCOUNTER — LAB (OUTPATIENT)
Dept: LAB | Facility: HOSPITAL | Age: 73
End: 2023-01-03
Payer: MEDICARE

## 2023-01-03 DIAGNOSIS — Z80.41 FH: OVARIAN CANCER: ICD-10-CM

## 2023-01-03 RX ORDER — ERGOCALCIFEROL 1.25 MG/1
CAPSULE ORAL
Qty: 12 CAPSULE | Refills: 3 | Status: SHIPPED | OUTPATIENT
Start: 2023-01-03

## 2023-01-03 NOTE — TELEPHONE ENCOUNTER
Rx Refill Note  Requested Prescriptions     Pending Prescriptions Disp Refills    vitamin D (ERGOCALCIFEROL) 1.25 MG (24518 UT) capsule capsule [Pharmacy Med Name: VITAMIN D2 1.25MG(50,000 UNIT)] 12 capsule 3     Sig: TAKE 1 CAPSULE BY MOUTH ONE TIME PER WEEK      Last office visit with prescribing clinician: 11/18/2022   Last telemedicine visit with prescribing clinician: 2/16/2023   Next office visit with prescribing clinician: 2/20/2023                         Would you like a call back once the refill request has been completed: [] Yes [] No    If the office needs to give you a call back, can they leave a voicemail: [] Yes [] No    Roxanne Christopher MA  01/03/23, 08:02 EST

## 2023-01-11 RX ORDER — RIVAROXABAN 20 MG/1
TABLET, FILM COATED ORAL
Qty: 90 TABLET | Refills: 3 | Status: SHIPPED | OUTPATIENT
Start: 2023-01-11

## 2023-01-24 ENCOUNTER — OFFICE VISIT (OUTPATIENT)
Dept: INTERNAL MEDICINE | Facility: CLINIC | Age: 73
End: 2023-01-24
Payer: MEDICARE

## 2023-01-24 VITALS
HEART RATE: 68 BPM | HEIGHT: 61 IN | OXYGEN SATURATION: 95 % | BODY MASS INDEX: 31.15 KG/M2 | DIASTOLIC BLOOD PRESSURE: 60 MMHG | WEIGHT: 165 LBS | SYSTOLIC BLOOD PRESSURE: 138 MMHG | TEMPERATURE: 98.4 F

## 2023-01-24 DIAGNOSIS — N89.8 VAGINAL ITCHING: Primary | ICD-10-CM

## 2023-01-24 LAB
BILIRUB BLD-MCNC: NEGATIVE MG/DL
CLARITY, POC: CLEAR
COLOR UR: YELLOW
EXPIRATION DATE: NORMAL
GLUCOSE UR STRIP-MCNC: NEGATIVE MG/DL
KETONES UR QL: NEGATIVE
LEUKOCYTE EST, POC: NEGATIVE
Lab: NORMAL
NITRITE UR-MCNC: NEGATIVE MG/ML
PH UR: 6 [PH] (ref 5–8)
PROT UR STRIP-MCNC: NEGATIVE MG/DL
RBC # UR STRIP: NEGATIVE /UL
SP GR UR: 1.01 (ref 1–1.03)
UROBILINOGEN UR QL: NORMAL

## 2023-01-24 PROCEDURE — 81003 URINALYSIS AUTO W/O SCOPE: CPT | Performed by: INTERNAL MEDICINE

## 2023-01-24 PROCEDURE — 99213 OFFICE O/P EST LOW 20 MIN: CPT | Performed by: INTERNAL MEDICINE

## 2023-01-24 RX ORDER — FLUCONAZOLE 150 MG/1
150 TABLET ORAL ONCE
Qty: 1 TABLET | Refills: 0 | Status: SHIPPED | OUTPATIENT
Start: 2023-01-24 | End: 2023-01-24

## 2023-01-24 NOTE — PROGRESS NOTES
Debbie Cheng is a 72 y.o. female, who presents with a chief complaint of   Chief Complaint   Patient presents with   • Vaginal Itching     Itching and pressure started about a week ago. Tried Monistat and wipes. Doesn't have itching today. Pt left an urine sample.            HPI   Pt here bc of vaginal itching.  She tried otc monistat.  Sx are some better today. No vaginal d/c. No recent abx or steroids.  No recent travel.  She had some vaginal irritation.  No dysuria.  + abd pressure.       The following portions of the patient's history were reviewed and updated as appropriate: allergies, current medications, past family history, past medical history, past social history, past surgical history and problem list.    Allergies: Penicillins, Erythromycin, and Astelin [azelastine]    Review of Systems   Constitutional: Negative.  Negative for chills and fever.   HENT: Negative.  Negative for sore throat.    Eyes: Negative.    Respiratory: Negative.    Cardiovascular: Negative.    Gastrointestinal: Negative.  Negative for abdominal pain, constipation, diarrhea, nausea and vomiting.   Endocrine: Negative.    Genitourinary: Positive for frequency and urgency. Negative for dysuria, flank pain and hematuria.   Musculoskeletal: Negative.  Negative for back pain.   Skin: Negative.  Negative for rash.   Allergic/Immunologic: Negative.    Neurological: Negative.  Negative for headaches.   Hematological: Negative.    Psychiatric/Behavioral: Negative.    All other systems reviewed and are negative.            Wt Readings from Last 3 Encounters:   01/24/23 74.8 kg (165 lb)   11/22/22 77.1 kg (170 lb)   11/18/22 78 kg (172 lb)     Temp Readings from Last 3 Encounters:   01/24/23 98.4 °F (36.9 °C) (Infrared)   11/18/22 97.3 °F (36.3 °C) (Infrared)   09/16/22 98.5 °F (36.9 °C)     BP Readings from Last 3 Encounters:   01/24/23 138/60   11/22/22 112/70   11/18/22 110/66     Pulse Readings from Last 3 Encounters:   01/24/23 68    11/22/22 55   11/18/22 70     Body mass index is 31.18 kg/m².  SpO2 Readings from Last 3 Encounters:   01/24/23 95%   11/22/22 94%   11/18/22 97%          Physical Exam  Vitals and nursing note reviewed.   Constitutional:       General: She is not in acute distress.     Appearance: She is well-developed.   HENT:      Head: Normocephalic and atraumatic.      Right Ear: External ear normal.      Left Ear: External ear normal.      Nose: Nose normal.   Eyes:      Conjunctiva/sclera: Conjunctivae normal.      Pupils: Pupils are equal, round, and reactive to light.   Cardiovascular:      Rate and Rhythm: Normal rate and regular rhythm.      Heart sounds: Normal heart sounds.   Pulmonary:      Effort: Pulmonary effort is normal. No respiratory distress.      Breath sounds: Normal breath sounds. No wheezing.   Musculoskeletal:         General: Normal range of motion.      Cervical back: Normal range of motion and neck supple.      Comments: Normal gait   Skin:     General: Skin is warm and dry.   Neurological:      Mental Status: She is alert and oriented to person, place, and time.   Psychiatric:         Behavior: Behavior normal.         Thought Content: Thought content normal.         Judgment: Judgment normal.         Results for orders placed or performed in visit on 01/24/23   POCT urinalysis dipstick, automated    Specimen: Urine   Result Value Ref Range    Color Yellow Yellow, Straw, Dark Yellow, Joyce    Clarity, UA Clear Clear    Specific Gravity  1.010 1.005 - 1.030    pH, Urine 6.0 5.0 - 8.0    Leukocytes Negative Negative    Nitrite, UA Negative Negative    Protein, POC Negative Negative mg/dL    Glucose, UA Negative Negative mg/dL    Ketones, UA Negative Negative    Urobilinogen, UA 0.2 E.U./dL Normal, 0.2 E.U./dL    Bilirubin Negative Negative    Blood, UA Negative Negative    Lot Number [id number]     Expiration Date 9/30/2023      Result Review :                  Assessment and Plan    Diagnoses and all  orders for this visit:    1. Vaginal itching (Primary)  -     POCT urinalysis dipstick, automated  -     fluconazole (Diflucan) 150 MG tablet; Take 1 tablet by mouth 1 (One) Time for 1 dose.  Dispense: 1 tablet; Refill: 0                 Outpatient Medications Prior to Visit   Medication Sig Dispense Refill   • fluticasone (FLONASE) 50 MCG/ACT nasal spray 2 sprays into the nostril(s) as directed by provider Daily. Administer 2 sprays in each nostril for each dose.     • glucose blood (OneTouch Ultra) test strip 1 each by Other route Daily. Use as instructed 100 each 3   • glucose monitor monitoring kit Use glucose meter to check fasting glucose once daily for diabetes monitoring. E11.9 1 each 0   • Lancets (accu-chek soft touch) lancets Use one lancet daily to check fasting glucose for diabetes monitoring. e11.9 100 each 12   • levocetirizine (XYZAL) 5 MG tablet Take 5 mg by mouth Every Morning.     • magnesium oxide (MAGOX) 400 (241.3 Mg) MG tablet tablet Take 400 mg by mouth Daily.     • metFORMIN ER (GLUCOPHAGE-XR) 500 MG 24 hr tablet Take 1 tablet by mouth Daily With Breakfast. 90 tablet 1   • metoprolol tartrate (LOPRESSOR) 25 MG tablet TAKE 1 TABLET BY MOUTH TWICE A  tablet 1   • montelukast (SINGULAIR) 10 MG tablet TAKE 1 TABLET BY MOUTH EVERY DAY AT NIGHT 90 tablet 3   • simvastatin (ZOCOR) 40 MG tablet TAKE 1 TABLET BY MOUTH EVERY DAY AT NIGHT 90 tablet 3   • valsartan-hydrochlorothiazide (DIOVAN-HCT) 320-12.5 MG per tablet TAKE 1 TABLET BY MOUTH EVERY DAY 90 tablet 3   • vitamin D (ERGOCALCIFEROL) 1.25 MG (56203 UT) capsule capsule TAKE 1 CAPSULE BY MOUTH ONE TIME PER WEEK 12 capsule 3   • Xarelto 20 MG tablet TAKE 1 TABLET BY MOUTH EVERY DAY 90 tablet 3   • VENTOLIN  (90 Base) MCG/ACT inhaler USE 2 PUFFS EVERY 4-6 HOURS AS NEEDED FOR COUGHING ,WHEEZING OR SHORTNESS OF BREATH.  3     No facility-administered medications prior to visit.     New Medications Ordered This Visit   Medications   •  fluconazole (Diflucan) 150 MG tablet     Sig: Take 1 tablet by mouth 1 (One) Time for 1 dose.     Dispense:  1 tablet     Refill:  0     [unfilled]  There are no discontinued medications.      Return if symptoms worsen or fail to improve.    Patient was given instructions and counseling regarding her condition or for health maintenance advice. Please see specific information pulled into the AVS if appropriate.

## 2023-02-20 ENCOUNTER — OFFICE VISIT (OUTPATIENT)
Dept: INTERNAL MEDICINE | Facility: CLINIC | Age: 73
End: 2023-02-20
Payer: MEDICARE

## 2023-02-20 VITALS
BODY MASS INDEX: 30.89 KG/M2 | WEIGHT: 163.6 LBS | DIASTOLIC BLOOD PRESSURE: 86 MMHG | SYSTOLIC BLOOD PRESSURE: 124 MMHG | HEART RATE: 58 BPM | TEMPERATURE: 97.8 F | HEIGHT: 61 IN | OXYGEN SATURATION: 97 %

## 2023-02-20 DIAGNOSIS — E11.9 CONTROLLED TYPE 2 DIABETES MELLITUS WITHOUT COMPLICATION, WITHOUT LONG-TERM CURRENT USE OF INSULIN: Primary | ICD-10-CM

## 2023-02-20 DIAGNOSIS — I10 PRIMARY HYPERTENSION: ICD-10-CM

## 2023-02-20 DIAGNOSIS — E78.2 MIXED HYPERLIPIDEMIA: ICD-10-CM

## 2023-02-20 DIAGNOSIS — I48.0 PAF (PAROXYSMAL ATRIAL FIBRILLATION): ICD-10-CM

## 2023-02-20 DIAGNOSIS — Z80.41 FH: OVARIAN CANCER: ICD-10-CM

## 2023-02-20 DIAGNOSIS — E55.9 VITAMIN D DEFICIENCY: ICD-10-CM

## 2023-02-20 PROCEDURE — 99214 OFFICE O/P EST MOD 30 MIN: CPT | Performed by: NURSE PRACTITIONER

## 2023-02-20 NOTE — PROGRESS NOTES
Chief Complaint   Patient presents with   • Diabetes     Follow up       Subjective     Debbie Cheng is a 72 y.o. female being seen for a follow up appointment today regarding Type 2 DM, PAF, HTN, Hyperlipidemia. and IFG. She is followed by Johnathan Ervin for PAF. She is on Xarelto 20mg daily. She was started on Metformin XR 50mg daily   She has been checking her glucose fasting, started 120-150s 3 months ago, and now trending 110s-130. She has reduced portions and reduced carbs. She has lost 7 pounds.      She is on Diovan /12.5 mg and Lopressor 25mg BID for HTN and rate control. She is tolerating it well. She has been doing [employer] and walking for exercise.      She is on Zocor 40mg nightly for cholesterol. She tolerates this well.      She has been having seasonal allergies. She is on Xyzal 5mg, Singulair 10mg and Flonase.       Answers for HPI/ROS submitted by the patient on 2/18/2023  Please describe your symptoms.: Follow up for labs  Have you had these symptoms before?: Yes  How long have you been having these symptoms?: Greater than 2 weeks  Please list any medications you are currently taking for this condition.: No new meds  Please describe any probable cause for these symptoms. : Nothing to report  What is the primary reason for your visit?: Other      History of Present Illness     Allergies   Allergen Reactions   • Penicillins Anaphylaxis and Swelling   • Erythromycin Other (See Comments)     JOINT PAIN AND SWELLING   • Astelin [Azelastine] Cough         Current Outpatient Medications:   •  fluticasone (FLONASE) 50 MCG/ACT nasal spray, 2 sprays into the nostril(s) as directed by provider Daily. Administer 2 sprays in each nostril for each dose., Disp: , Rfl:   •  glucose blood (OneTouch Ultra) test strip, 1 each by Other route Daily. Use as instructed, Disp: 100 each, Rfl: 3  •  glucose monitor monitoring kit, Use glucose meter to check fasting glucose once daily for diabetes monitoring. E11.9, Disp: 1  each, Rfl: 0  •  Lancets (accu-chek soft touch) lancets, Use one lancet daily to check fasting glucose for diabetes monitoring. e11.9, Disp: 100 each, Rfl: 12  •  levocetirizine (XYZAL) 5 MG tablet, Take 5 mg by mouth Every Morning., Disp: , Rfl:   •  magnesium oxide (MAGOX) 400 (241.3 Mg) MG tablet tablet, Take 400 mg by mouth Daily., Disp: , Rfl:   •  metFORMIN ER (GLUCOPHAGE-XR) 500 MG 24 hr tablet, Take 1 tablet by mouth Daily With Breakfast., Disp: 90 tablet, Rfl: 1  •  metoprolol tartrate (LOPRESSOR) 25 MG tablet, TAKE 1 TABLET BY MOUTH TWICE A DAY, Disp: 180 tablet, Rfl: 1  •  montelukast (SINGULAIR) 10 MG tablet, TAKE 1 TABLET BY MOUTH EVERY DAY AT NIGHT, Disp: 90 tablet, Rfl: 3  •  simvastatin (ZOCOR) 40 MG tablet, TAKE 1 TABLET BY MOUTH EVERY DAY AT NIGHT, Disp: 90 tablet, Rfl: 3  •  valsartan-hydrochlorothiazide (DIOVAN-HCT) 320-12.5 MG per tablet, TAKE 1 TABLET BY MOUTH EVERY DAY, Disp: 90 tablet, Rfl: 3  •  VENTOLIN  (90 Base) MCG/ACT inhaler, USE 2 PUFFS EVERY 4-6 HOURS AS NEEDED FOR COUGHING ,WHEEZING OR SHORTNESS OF BREATH., Disp: , Rfl: 3  •  vitamin D (ERGOCALCIFEROL) 1.25 MG (38219 UT) capsule capsule, TAKE 1 CAPSULE BY MOUTH ONE TIME PER WEEK, Disp: 12 capsule, Rfl: 3  •  Xarelto 20 MG tablet, TAKE 1 TABLET BY MOUTH EVERY DAY, Disp: 90 tablet, Rfl: 3    The following portions of the patient's history were reviewed and updated as appropriate: allergies, current medications, past family history, past medical history, past social history, past surgical history and problem list.    Review of Systems   Constitutional: Negative.    HENT: Negative.    Eyes: Negative.    Respiratory: Negative.    Cardiovascular: Negative.  Negative for chest pain, palpitations and leg swelling.   Gastrointestinal: Negative.    Endocrine: Negative.    Genitourinary: Negative.    Musculoskeletal: Negative.    Skin: Negative.    Allergic/Immunologic: Negative.    Neurological: Negative.    Hematological: Negative.     Psychiatric/Behavioral: Negative.    All other systems reviewed and are negative.      Assessment     Physical Exam  Vitals reviewed.   Constitutional:       Appearance: Normal appearance. She is not ill-appearing.   HENT:      Head: Normocephalic.      Right Ear: Decreased hearing (wearing hearing aides) noted.      Left Ear: Decreased hearing (wearing hearing aides) noted.   Cardiovascular:      Rate and Rhythm: Normal rate and regular rhythm.      Pulses: Normal pulses.      Heart sounds: Normal heart sounds. No murmur heard.  Pulmonary:      Effort: Pulmonary effort is normal. No respiratory distress.      Breath sounds: Normal breath sounds. No stridor.   Musculoskeletal:      Cervical back: Neck supple.      Right lower leg: No edema.      Left lower leg: No edema.   Neurological:      General: No focal deficit present.      Mental Status: She is alert and oriented to person, place, and time.   Psychiatric:         Mood and Affect: Mood normal.         Behavior: Behavior normal.         Thought Content: Thought content normal.         Plan     Her fasting labs were reviewed with the patient from last week.     Diagnoses and all orders for this visit:    1. Controlled type 2 diabetes mellitus without complication, without long-term current use of insulin (HCC) (Primary)  Comments:  Hgb A1c goall < 7%, at goal. Improved with diet and Metformin XR 500mg daily  Orders:  -     Comprehensive Metabolic Panel; Future  -     CBC & Differential; Future  -     Lipid Panel With / Chol / HDL Ratio; Future  -     Hemoglobin A1c; Future    2. Primary hypertension  Comments:  BP at goal on current meds  Orders:  -     Comprehensive Metabolic Panel; Future  -     CBC & Differential; Future  -     Lipid Panel With / Chol / HDL Ratio; Future  -     Hemoglobin A1c; Future    3. Mixed hyperlipidemia  Comments:  LDL goal < 100. On statin therapy  Orders:  -     Comprehensive Metabolic Panel; Future  -     CBC & Differential;  Future  -     Lipid Panel With / Chol / HDL Ratio; Future    4. PAF (paroxysmal atrial fibrillation) (HCC)  -     Comprehensive Metabolic Panel; Future  -     CBC & Differential; Future  -     Lipid Panel With / Chol / HDL Ratio; Future    5. Vitamin D deficiency  -     Vitamin D,25-Hydroxy; Future    6. FH: ovarian cancer  Comments:  Discussed [facility]      Follow up in 6 months w labs

## 2023-02-24 ENCOUNTER — LAB (OUTPATIENT)
Dept: LAB | Facility: HOSPITAL | Age: 73
End: 2023-02-24
Payer: MEDICARE

## 2023-02-24 DIAGNOSIS — E11.9 CONTROLLED TYPE 2 DIABETES MELLITUS WITHOUT COMPLICATION, WITHOUT LONG-TERM CURRENT USE OF INSULIN: ICD-10-CM

## 2023-02-28 RX ORDER — LANCETS 33 GAUGE
EACH MISCELLANEOUS
Qty: 100 EACH | Refills: 12 | Status: SHIPPED | OUTPATIENT
Start: 2023-02-28

## 2023-03-02 DIAGNOSIS — E11.65 UNCONTROLLED TYPE 2 DIABETES MELLITUS WITH HYPERGLYCEMIA: ICD-10-CM

## 2023-03-02 RX ORDER — METFORMIN HYDROCHLORIDE 500 MG/1
TABLET, EXTENDED RELEASE ORAL
Qty: 90 TABLET | Refills: 1 | Status: SHIPPED | OUTPATIENT
Start: 2023-03-02

## 2023-03-31 ENCOUNTER — TELEPHONE (OUTPATIENT)
Dept: INTERNAL MEDICINE | Facility: CLINIC | Age: 73
End: 2023-03-31

## 2023-03-31 NOTE — TELEPHONE ENCOUNTER
Caller: Debbie Cheng    Relationship: Self    Best call back number: [phone]    What was the call regarding: PATIENT IS CALLING TO SEE IF THERE HAS BEEN A RESPONSE TO HER MESSAGE    PLEASE CALL AND ADVISE     Do you require a callback: YES

## 2023-03-31 NOTE — TELEPHONE ENCOUNTER
Called and spoke with patient and informed her that she would need an appointment and since we did not have any openings I advised that she go to the UC if she gets worse. She said she would call back on Monday if she did not feel any better.

## 2023-03-31 NOTE — TELEPHONE ENCOUNTER
Caller: Debbie Cheng    Relationship: Self    Best call back number:[phone]    What medication are you requesting: COUGH MEDICATION    What are your current symptoms:INTERMITTENT COUGHING,HAD WHEEZING BEFORE,COVID NEGATIVE     How long have you been experiencing symptoms: ABOUT A WEEK    Have you had these symptoms before:    [x] Yes  [] No    Have you been treated for these symptoms before:   [x] Yes  [] No    If a prescription is needed, what is your preferred pharmacy and phone number: [facility]/PHARMACY #[id number] - [place], [place] - [address] [facility] [phone] [facility] [phone]      Additional notes:PATIENT STATES THAT SHE HAS A HISTORY OF BRONCHITIS ND IS WONDERING IF IT MAKE BE BRONCHITIS. PATIENT IS REQUESTING IF SOME MEDICATION COULD BE CALLED IN OR IF SHE NEEDS TO COME IN FOR AN APPOINTMENT TO GIVE A CALLBACK TO LET HER KNOW.

## 2023-08-02 DIAGNOSIS — I10 PRIMARY HYPERTENSION: ICD-10-CM

## 2023-08-02 DIAGNOSIS — E55.9 VITAMIN D DEFICIENCY: ICD-10-CM

## 2023-08-02 DIAGNOSIS — I48.0 PAF (PAROXYSMAL ATRIAL FIBRILLATION): ICD-10-CM

## 2023-08-02 DIAGNOSIS — E78.2 MIXED HYPERLIPIDEMIA: ICD-10-CM

## 2023-08-02 DIAGNOSIS — E11.9 CONTROLLED TYPE 2 DIABETES MELLITUS WITHOUT COMPLICATION, WITHOUT LONG-TERM CURRENT USE OF INSULIN: ICD-10-CM

## 2023-08-11 LAB
25(OH)D3+25(OH)D2 SERPL-MCNC: 73.3 NG/ML (ref 30–100)
ALBUMIN SERPL-MCNC: 4.4 G/DL (ref 3.5–5.2)
ALBUMIN/GLOB SERPL: 1.8 G/DL
ALP SERPL-CCNC: 66 U/L (ref 39–117)
ALT SERPL-CCNC: 19 U/L (ref 1–33)
AST SERPL-CCNC: 19 U/L (ref 1–32)
BASOPHILS # BLD AUTO: 0.02 10*3/MM3 (ref 0–0.2)
BASOPHILS NFR BLD AUTO: 0.4 % (ref 0–1.5)
BILIRUB SERPL-MCNC: 0.9 MG/DL (ref 0–1.2)
BUN SERPL-MCNC: 21 MG/DL (ref 8–23)
BUN/CREAT SERPL: 22.8 (ref 7–25)
CALCIUM SERPL-MCNC: 9.9 MG/DL (ref 8.6–10.5)
CHLORIDE SERPL-SCNC: 102 MMOL/L (ref 98–107)
CHOLEST SERPL-MCNC: 160 MG/DL (ref 0–200)
CHOLEST/HDLC SERPL: 2.91 {RATIO}
CO2 SERPL-SCNC: 28.3 MMOL/L (ref 22–29)
CREAT SERPL-MCNC: 0.92 MG/DL (ref 0.57–1)
EGFRCR SERPLBLD CKD-EPI 2021: 65.9 ML/MIN/1.73
EOSINOPHIL # BLD AUTO: 0.1 10*3/MM3 (ref 0–0.4)
EOSINOPHIL NFR BLD AUTO: 2.2 % (ref 0.3–6.2)
ERYTHROCYTE [DISTWIDTH] IN BLOOD BY AUTOMATED COUNT: 12.6 % (ref 12.3–15.4)
GLOBULIN SER CALC-MCNC: 2.4 GM/DL
GLUCOSE SERPL-MCNC: 114 MG/DL (ref 65–99)
HBA1C MFR BLD: 6.5 % (ref 4.8–5.6)
HCT VFR BLD AUTO: 39.5 % (ref 34–46.6)
HDLC SERPL-MCNC: 55 MG/DL (ref 40–60)
HGB BLD-MCNC: 13.3 G/DL (ref 12–15.9)
IMM GRANULOCYTES # BLD AUTO: 0 10*3/MM3 (ref 0–0.05)
IMM GRANULOCYTES NFR BLD AUTO: 0 % (ref 0–0.5)
LDLC SERPL CALC-MCNC: 82 MG/DL (ref 0–100)
LYMPHOCYTES # BLD AUTO: 1.51 10*3/MM3 (ref 0.7–3.1)
LYMPHOCYTES NFR BLD AUTO: 33.9 % (ref 19.6–45.3)
MCH RBC QN AUTO: 31.4 PG (ref 26.6–33)
MCHC RBC AUTO-ENTMCNC: 33.7 G/DL (ref 31.5–35.7)
MCV RBC AUTO: 93.2 FL (ref 79–97)
MONOCYTES # BLD AUTO: 0.64 10*3/MM3 (ref 0.1–0.9)
MONOCYTES NFR BLD AUTO: 14.3 % (ref 5–12)
NEUTROPHILS # BLD AUTO: 2.19 10*3/MM3 (ref 1.7–7)
NEUTROPHILS NFR BLD AUTO: 49.2 % (ref 42.7–76)
NRBC BLD AUTO-RTO: 0 /100 WBC (ref 0–0.2)
PLATELET # BLD AUTO: 237 10*3/MM3 (ref 140–450)
POTASSIUM SERPL-SCNC: 4 MMOL/L (ref 3.5–5.2)
PROT SERPL-MCNC: 6.8 G/DL (ref 6–8.5)
RBC # BLD AUTO: 4.24 10*6/MM3 (ref 3.77–5.28)
SODIUM SERPL-SCNC: 145 MMOL/L (ref 136–145)
TRIGL SERPL-MCNC: 131 MG/DL (ref 0–150)
VLDLC SERPL CALC-MCNC: 23 MG/DL (ref 5–40)
WBC # BLD AUTO: 4.46 10*3/MM3 (ref 3.4–10.8)

## 2023-08-21 ENCOUNTER — OFFICE VISIT (OUTPATIENT)
Dept: INTERNAL MEDICINE | Facility: CLINIC | Age: 73
End: 2023-08-21
Payer: MEDICARE

## 2023-08-21 VITALS
DIASTOLIC BLOOD PRESSURE: 68 MMHG | BODY MASS INDEX: 29.83 KG/M2 | HEIGHT: 61 IN | OXYGEN SATURATION: 97 % | HEART RATE: 59 BPM | TEMPERATURE: 97.7 F | WEIGHT: 158 LBS | SYSTOLIC BLOOD PRESSURE: 118 MMHG

## 2023-08-21 DIAGNOSIS — E78.2 MIXED HYPERLIPIDEMIA: ICD-10-CM

## 2023-08-21 DIAGNOSIS — I48.0 PAF (PAROXYSMAL ATRIAL FIBRILLATION): ICD-10-CM

## 2023-08-21 DIAGNOSIS — E55.9 VITAMIN D DEFICIENCY: ICD-10-CM

## 2023-08-21 DIAGNOSIS — E11.9 CONTROLLED TYPE 2 DIABETES MELLITUS WITHOUT COMPLICATION, WITHOUT LONG-TERM CURRENT USE OF INSULIN: Primary | ICD-10-CM

## 2023-08-21 DIAGNOSIS — I10 PRIMARY HYPERTENSION: ICD-10-CM

## 2023-08-21 PROCEDURE — 3078F DIAST BP <80 MM HG: CPT | Performed by: NURSE PRACTITIONER

## 2023-08-21 PROCEDURE — 3074F SYST BP LT 130 MM HG: CPT | Performed by: NURSE PRACTITIONER

## 2023-08-21 PROCEDURE — 3044F HG A1C LEVEL LT 7.0%: CPT | Performed by: NURSE PRACTITIONER

## 2023-08-21 PROCEDURE — 99214 OFFICE O/P EST MOD 30 MIN: CPT | Performed by: NURSE PRACTITIONER

## 2023-08-21 RX ORDER — MULTIPLE VITAMINS W/ MINERALS TAB 9MG-400MCG
1 TAB ORAL DAILY
COMMUNITY

## 2023-08-21 NOTE — PROGRESS NOTES
Chief Complaint   Patient presents with    Diabetes Mellitus     F/u and lab results       Subjective     Debbie Cheng is a 72 y.o. female being seen for a follow up appointment today regarding Type 2 DM, PAF, HTN, Hyperlipidemia, Vit D def, and IFG. She is followed by Johnathan Ervin for PAF. She is on Xarelto 20mg daily.     She is currently on  Metformin XR 500mg daily for DM2. She has been checking her glucose fasting, running 110-120s. She has reduced portions and reduced carbs. She has lost 12 pounds. She is having some stool urgency form Metformin, but it is tolerable.      She is on Diovan /12.5 mg and Lopressor 25mg BID for HTN and rate control. She is tolerating it well. BP at home 110s/80s. She has been doing [employer], living well classes, and walking for exercise.      She is on Zocor 40mg nightly for cholesterol. She tolerates this well.      She has been having seasonal allergies. She is on Xyzal 5mg, Singulair 10mg and Flonase.    She has Vit D def, and takes a weekly supplement.     She has been having some pain in right buttocks after stepping up on a trailer and wearing some new shoes.     Diabetes Mellitus  Associated symptoms include myalgias. Pertinent negatives include no chest pain.      Allergies   Allergen Reactions    Penicillins Anaphylaxis and Swelling    Erythromycin Other (See Comments)     JOINT PAIN AND SWELLING    Astelin [Azelastine] Cough         Current Outpatient Medications:     fluticasone (FLONASE) 50 MCG/ACT nasal spray, 2 sprays into the nostril(s) as directed by provider Daily. Administer 2 sprays in each nostril for each dose., Disp: , Rfl:     glucose blood (OneTouch Ultra) test strip, 1 each by Other route Daily. Use as instructed, Disp: 100 each, Rfl: 3    glucose monitor monitoring kit, Use glucose meter to check fasting glucose once daily for diabetes monitoring. E11.9, Disp: 1 each, Rfl: 0    levocetirizine (XYZAL) 5 MG tablet, Take 1 tablet by mouth Every Morning.,  Disp: , Rfl:     magnesium oxide (MAGOX) 400 (241.3 Mg) MG tablet tablet, Take 1 tablet by mouth Daily., Disp: , Rfl:     metFORMIN ER (GLUCOPHAGE-XR) 500 MG 24 hr tablet, TAKE 1 TABLET BY MOUTH EVERY DAY WITH BREAKFAST, Disp: 90 tablet, Rfl: 1    metoprolol tartrate (LOPRESSOR) 25 MG tablet, TAKE 1 TABLET BY MOUTH TWICE A DAY, Disp: 180 tablet, Rfl: 1    montelukast (SINGULAIR) 10 MG tablet, TAKE 1 TABLET BY MOUTH EVERY DAY AT NIGHT, Disp: 90 tablet, Rfl: 3    multivitamin with minerals (CENTRUM SILVER PO), Take 1 tablet by mouth Daily., Disp: , Rfl:     OneTouch Delica Lancets 33G misc, USE ONE LANCET DAILY TO CHECK FASTING GLUCOSE FOR DIABETES MONITORING. E11.9, Disp: 100 each, Rfl: 12    simvastatin (ZOCOR) 40 MG tablet, TAKE 1 TABLET BY MOUTH EVERY DAY AT NIGHT, Disp: 90 tablet, Rfl: 3    valsartan-hydrochlorothiazide (DIOVAN-HCT) 320-12.5 MG per tablet, TAKE 1 TABLET BY MOUTH EVERY DAY, Disp: 90 tablet, Rfl: 3    VENTOLIN  (90 Base) MCG/ACT inhaler, USE 2 PUFFS EVERY 4-6 HOURS AS NEEDED FOR COUGHING ,WHEEZING OR SHORTNESS OF BREATH., Disp: , Rfl: 3    vitamin D (ERGOCALCIFEROL) 1.25 MG (20361 UT) capsule capsule, TAKE 1 CAPSULE BY MOUTH ONE TIME PER WEEK, Disp: 12 capsule, Rfl: 3    Xarelto 20 MG tablet, TAKE 1 TABLET BY MOUTH EVERY DAY, Disp: 90 tablet, Rfl: 3    The following portions of the patient's history were reviewed and updated as appropriate: allergies, current medications, past family history, past medical history, past social history, past surgical history, and problem list.    Review of Systems   Constitutional: Negative.    Eyes: Negative.    Respiratory: Negative.     Cardiovascular: Negative.  Negative for chest pain, palpitations and leg swelling.   Gastrointestinal: Negative.    Genitourinary: Negative.    Musculoskeletal:  Positive for myalgias.   Skin: Negative.    Allergic/Immunologic: Positive for environmental allergies.   Hematological: Negative.    Psychiatric/Behavioral:  Negative.     All other systems reviewed and are negative.    Assessment     Physical Exam  Vitals reviewed.   Constitutional:       Appearance: Normal appearance.   Cardiovascular:      Rate and Rhythm: Normal rate and regular rhythm.      Pulses: Normal pulses.      Heart sounds: Normal heart sounds. No murmur heard.  Pulmonary:      Effort: Pulmonary effort is normal. No respiratory distress.      Breath sounds: Normal breath sounds. No stridor.   Musculoskeletal:      Right lower leg: No edema.      Left lower leg: No edema.   Skin:     General: Skin is warm and dry.   Neurological:      Mental Status: She is alert and oriented to person, place, and time.   Psychiatric:         Mood and Affect: Mood normal.         Behavior: Behavior normal.         Thought Content: Thought content normal.       Plan     Her fasting labs were reviewed with the patient from last week.     Diagnoses and all orders for this visit:    1. Controlled type 2 diabetes mellitus without complication, without long-term current use of insulin (Primary)  Comments:  Hgb A1c improved. hgb A1C 6.5%  Orders:  -     Comprehensive Metabolic Panel; Future  -     Hemoglobin A1c; Future    2. PAF (paroxysmal atrial fibrillation)  Comments:  Rate controlled and no recent episodes. She will get a Smart Watch to trend. On xarelto 20mg daily    3. Primary hypertension  Comments:  BP at goal on current meds  Orders:  -     CBC & Differential; Future  -     Comprehensive Metabolic Panel; Future  -     Lipid Panel With / Chol / HDL Ratio; Future    4. Vitamin D deficiency  -     Vitamin D,25-Hydroxy; Future    5. 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

## 2023-08-22 ENCOUNTER — HOSPITAL ENCOUNTER (EMERGENCY)
Facility: HOSPITAL | Age: 73
Discharge: HOME | End: 2023-08-22
Attending: EMERGENCY MEDICINE | Admitting: EMERGENCY MEDICINE
Payer: MEDICARE

## 2023-08-22 ENCOUNTER — APPOINTMENT (OUTPATIENT)
Dept: CT IMAGING | Facility: HOSPITAL | Age: 73
End: 2023-08-22
Payer: MEDICARE

## 2023-08-22 VITALS
HEART RATE: 67 BPM | TEMPERATURE: 98.3 F | RESPIRATION RATE: 16 BRPM | HEIGHT: 61 IN | DIASTOLIC BLOOD PRESSURE: 61 MMHG | BODY MASS INDEX: 29.45 KG/M2 | WEIGHT: 156 LBS | OXYGEN SATURATION: 94 % | SYSTOLIC BLOOD PRESSURE: 128 MMHG

## 2023-08-22 DIAGNOSIS — S01.01XA SCALP LACERATION, INITIAL ENCOUNTER: Primary | ICD-10-CM

## 2023-08-22 PROCEDURE — 72125 CT NECK SPINE W/O DYE: CPT

## 2023-08-22 PROCEDURE — 99284 EMERGENCY DEPT VISIT MOD MDM: CPT

## 2023-08-22 PROCEDURE — 70450 CT HEAD/BRAIN W/O DYE: CPT

## 2023-08-22 NOTE — ED PROVIDER NOTES
"Subjective   History of Present Illness  72-year-old female past medical history significant for paroxysmal atrial fibrillation on anticoagulation who presents emergency room with complaint of mechanical fall in garage today while trying to get around all of the \"junk \"that her  had stored in the garage.  Patient fell backwards onto her head hitting the concrete with no loss of consciousness but does have a laceration on the back of her scalp.  Patient reports no other complaints.  Patient states her tetanus is up-to-date    Review of Systems   Constitutional:  Negative for activity change, appetite change, chills, diaphoresis, fatigue and fever.   HENT:  Negative for congestion, rhinorrhea and sore throat.    Eyes:  Negative for photophobia and visual disturbance.   Respiratory:  Negative for cough and shortness of breath.    Cardiovascular:  Negative for chest pain, palpitations and leg swelling.   Gastrointestinal:  Negative for abdominal distention, abdominal pain, diarrhea, nausea and vomiting.   Genitourinary:  Negative for dysuria and flank pain.   Musculoskeletal:  Negative for arthralgias and back pain.   Skin:  Positive for wound. Negative for rash.   Neurological:  Negative for dizziness, weakness and headaches.   Psychiatric/Behavioral:  Negative for agitation and behavioral problems.      Past Medical History:   Diagnosis Date    Acute bacterial conjunctivitis of left eye 10/20/2016    Allergic rhinitis     Asthma     Bronchitis     Colon polyp     Hyperlipidemia     Hypertension     PAF (paroxysmal atrial fibrillation)     one episode, 5/2015; was briefly treated with Xarelto    Pneumonia     Premature atrial complexes 5/24/2022    PVCs (premature ventricular contractions) 5/24/2022    Rotator cuff tendonitis     Type 2 diabetes mellitus     UTI (urinary tract infection)     Vitamin D deficiency        Allergies   Allergen Reactions    Penicillins Anaphylaxis and Swelling    Erythromycin Other " (See Comments)     JOINT PAIN AND SWELLING    Astelin [Azelastine] Cough       Past Surgical History:   Procedure Laterality Date     SECTION      CHOLECYSTECTOMY      COLONOSCOPY      EYE SURGERY Bilateral     cataract surgery    GUM SURGERY  2017    HYSTERECTOMY      KNEE SURGERY         Family History   Problem Relation Age of Onset    Stroke Mother     Hypertension Mother     Heart disease Father     Hypertension Sister     Colon polyps Sister     Heart disease Brother     Cancer Paternal Grandmother         breast    Breast cancer Paternal Grandmother     Heart disease Paternal Grandfather     Diabetes Paternal Grandfather        Social History     Socioeconomic History    Marital status:    Tobacco Use    Smoking status: Never     Passive exposure: Never    Smokeless tobacco: Never    Tobacco comments:     CAFFEINE daily    Vaping Use    Vaping Use: Never used   Substance and Sexual Activity    Alcohol use: No    Drug use: No    Sexual activity: Not Currently           Objective   Physical Exam  Vitals and nursing note reviewed.   Constitutional:       General: She is not in acute distress.     Appearance: Normal appearance. She is not ill-appearing, toxic-appearing or diaphoretic.      Comments: Pleasant 72-year-old female in no apparent distress   HENT:      Head: Normocephalic and atraumatic. No raccoon eyes or Castrejon's sign. Hair is normal.        Comments: Patient is a 1 cm laceration to posterior scalp as seen on diagram.  There is no active bleeding and Galea is not visualized.     Nose: Nose normal.      Mouth/Throat:      Mouth: Mucous membranes are moist.   Eyes:      Conjunctiva/sclera: Conjunctivae normal.   Neck:      Vascular: No carotid bruit.   Cardiovascular:      Rate and Rhythm: Normal rate.      Pulses: Normal pulses.   Pulmonary:      Effort: Pulmonary effort is normal.   Abdominal:      General: Abdomen is flat. There is no distension.      Tenderness: There is no  abdominal tenderness.   Musculoskeletal:         General: No swelling. Normal range of motion.      Cervical back: Normal range of motion and neck supple. No rigidity or tenderness.   Lymphadenopathy:      Cervical: No cervical adenopathy.   Skin:     General: Skin is warm and dry.   Neurological:      General: No focal deficit present.      Mental Status: She is alert and oriented to person, place, and time.   Psychiatric:         Mood and Affect: Mood normal.       Laceration Repair    Date/Time: 8/22/2023 7:13 PM  Performed by: Steve Dean MD  Authorized by: Steve Dean MD     Consent:     Consent obtained:  Verbal    Risks discussed:  Infection and pain  Universal protocol:     Patient identity confirmed:  Verbally with patient  Anesthesia:     Anesthesia method:  None  Laceration details:     Location:  Scalp    Scalp location:  Occipital    Length (cm):  1  Treatment:     Area cleansed with:  Shur-Clens    Amount of cleaning:  Standard  Skin repair:     Repair method:  Staples    Number of staples:  2  Approximation:     Approximation:  Close  Repair type:     Repair type:  Simple  Post-procedure details:     Dressing:  Antibiotic ointment    Procedure completion:  Tolerated well, no immediate complications           ED Course  ED Course as of 08/22/23 1914   Tue Aug 22, 2023   1818 Patient had minor fall with small laceration but patient is 72 years old and on anticoagulation.  We will proceed with CT of head and cervical spine and will repair laceration with staples. []   1904 CT C-spine:  IMPRESSION:  No acute radiographic findings. Cervical spondylotic change as  described.   []   1904 CT head:  IMPRESSION:  No acute intracranial findings. Mild aging changes of the brain.        This report was finalized on 8/22/2023 6:52 PM by Dr. Juan Guzman MD.   []      ED Course User Index  [] Steve Dean MD                                           Medical Decision Making  My  differential diagnosis includes but is not limited to cerebral contusion, cervical strain, concussion with LOC, concussion without LOC, contusion, fracture of the skull, orbits or mandible, hematoma, intracranial hemorrhage including subdural, epidural, subarachnoid and intracerebral, laceration and postconcussion syndrome     Amount and/or Complexity of Data Reviewed  Radiology: ordered. Decision-making details documented in ED Course.        Final diagnoses:   Scalp laceration, initial encounter       ED Disposition  ED Disposition       ED Disposition   Discharge    Condition   Stable    Comment   --               Terra Reddy, APRN  [address]  [address]  [place] [id number] [phone]    In 2 weeks  For suture removal    [facility] EMERGENCY DEPARTMENT  [address]  [place] [address] [phone]  Go to   As needed         Medication List      No changes were made to your prescriptions during this visit.            Steve Dean MD  08/22/23 0326

## 2023-08-24 ENCOUNTER — PATIENT OUTREACH (OUTPATIENT)
Dept: CASE MANAGEMENT | Facility: OTHER | Age: 73
End: 2023-08-24
Payer: MEDICARE

## 2023-08-24 NOTE — OUTREACH NOTE
AMBULATORY CASE MANAGEMENT NOTE    Name and Relationship of Patient/Support Person: Debbie Cheng T - Ramiro    Patient Outreach  RN-ACM outreach with patient. Discussed 8/22/23 ED visit regarding scalp laceration.with fall. Patient treated and discharged home. Patient states to be compliant with ED recommendations; scalp staples intact and has 9/5/23 PCP appointment scheduled for staple removal. Patient states no difficulty with redness; warmth; drainage to scalp laceration and no difficulty with headache; nausea; vomiting or dizziness. Patient states no difficulty with chest pain; SOB; appetite or sleeping. Patient states to be compliant with medications; medical appointments and monitoring of blood sugars. Reviewed with patient ED AVS recommendations; education; role of RN-ACM and HRCM case management services. Patient verbalized understanding. Patient states to appreciate outreach. No further questions voiced at this time.   Adult Patient Profile  Questions/Answers      Flowsheet Row Most Recent Value   Symptoms/Conditions Managed at Home skin, diabetes, type 2, cardiovascular   Cardiovascular Symptoms/Conditions hypertension   Cardiovascular Management Strategies medication therapy, routine screening   Diabetes Management Strategies blood glucose testing, medication therapy, routine screenings   Skin Symptoms/Conditions wound   Skin Management Strategies --  [Staples to scalp laceration,  physician follow up for staple removal.]   Barriers to Taking Medication as Prescribed none   Equipment Currently Used at Home bp cuff, glucometer        Social Work Assessment  Questions/Answers      Flowsheet Row Most Recent Value   People in Home spouse   Functional Status Comments Patient states to be independent with ADL's,  meal preparation,  transportation and ambulates without assitive device.   Equipment Currently Used at Home bp cuff, glucometer          Send Education  Questions/Answers      Flowsheet Row Most Recent  Value   Annual Wellness Visit:  Patient Has Completed   Other Patient Education/Resources  24/7 [facility] Nurse Call Line   24/7 Nurse Call Line Education Method Verbal        SDOH updated and reviewed with the patient during this program:  Food Insecurity: No Food Insecurity    Worried About Running Out of Food in the Last Year: Never true    Ran Out of Food in the Last Year: Never true      Transportation Needs: No Transportation Needs    Lack of Transportation (Medical): No    Lack of Transportation (Non-Medical): No     Education Documentation  Bleeding, taught by Diandra Johnson RN at 8/24/2023 12:28 PM.  Learner: Patient  Readiness: Acceptance  Method: Explanation  Response: Verbalizes Understanding    Unresolved/Worsening Symptoms, taught by Diandra Johnson RN at 8/24/2023 12:20 PM.  Learner: Patient  Readiness: Acceptance  Method: Explanation  Response: Verbalizes Understanding    Safety, taught by Diandra Johnson RN at 8/24/2023 12:20 PM.  Learner: Patient  Readiness: Acceptance  Method: Explanation  Response: Verbalizes Understanding    Unresolved/Worsening Symptoms, taught by Diandra Johnson RN at 8/24/2023 12:20 PM.  Learner: Patient  Readiness: Acceptance  Method: Explanation  Response: Verbalizes Understanding    Site Care, taught by Diandra Johnson RN at 8/24/2023 12:20 PM.  Learner: Patient  Readiness: Acceptance  Method: Explanation  Response: Verbalizes Understanding    Provider Follow-Up, taught by Diandra Johnson RN at 8/24/2023 12:20 PM.  Learner: Patient  Readiness: Acceptance  Method: Explanation  Response: Verbalizes Understanding    Blood Glucose Monitoring, taught by Diandra Johnson RN at 8/24/2023 12:20 PM.  Learner: Patient  Readiness: Acceptance  Method: Explanation  Response: Verbalizes Understanding          Diandra SERRANO  Ambulatory Case Management    8/24/2023, 12:28 EDT

## 2023-09-05 ENCOUNTER — OFFICE VISIT (OUTPATIENT)
Dept: INTERNAL MEDICINE | Facility: CLINIC | Age: 73
End: 2023-09-05
Payer: MEDICARE

## 2023-09-05 VITALS
BODY MASS INDEX: 29.79 KG/M2 | SYSTOLIC BLOOD PRESSURE: 140 MMHG | WEIGHT: 157.8 LBS | HEART RATE: 67 BPM | TEMPERATURE: 97.1 F | DIASTOLIC BLOOD PRESSURE: 80 MMHG | HEIGHT: 61 IN | OXYGEN SATURATION: 95 %

## 2023-09-05 DIAGNOSIS — Z09 ENCOUNTER FOR EXAMINATION FOLLOWING TREATMENT AT HOSPITAL: Primary | ICD-10-CM

## 2023-09-05 DIAGNOSIS — S01.01XA LACERATION OF SCALP, INITIAL ENCOUNTER: ICD-10-CM

## 2023-09-05 NOTE — PROGRESS NOTES
"Debbie Cheng is a 72 y.o. female presenting today for   Chief Complaint   Patient presents with    Suture / Staple Removal       Subjective    Suture / Staple Removal       ED with Steve Dean MD (08/22/2023)   72-year-old female past medical history significant for paroxysmal atrial fibrillation on anticoagulation who presents emergency room with complaint of mechanical fall in garage today while trying to get around all of the \"junk \"that her  had stored in the garage. Patient fell backwards onto her head hitting the concrete with no loss of consciousness but does have a laceration on the back of her scalp. Patient reports no other complaints. Patient states her tetanus is up-to-date     Laceration Repair  Date/Time: 8/22/2023 7:13 PM  Performed by: Steve Dean MD  Authorized by: Steve Dean MD   Consent:     Consent obtained:  Verbal    Risks discussed:  Infection and pain  Universal protocol:     Patient identity confirmed:  Verbally with patient  Anesthesia:     Anesthesia method:  None  Laceration details:     Location:  Scalp    Scalp location:  Occipital    Length (cm):  1  Treatment:     Area cleansed with:  Shur-Clens    Amount of cleaning:  Standard  Skin repair:     Repair method:  Staples    Number of staples:  2  Approximation:     Approximation:  Close  Repair type:     Repair type:  Simple  Post-procedure details:     Dressing:  Antibiotic ointment    Procedure completion:  Tolerated well, no immediate complications       The following portions of the patient's history were reviewed and updated as appropriate: allergies, current medications, problem list, past medical history, past surgical history, family history, and social history.    Review of Systems   Constitutional:  Negative for chills and fever.   Skin:  Positive for wound.       Objective    Vitals:    09/05/23 0819   BP: 140/80   BP Location: Left arm   Patient Position: Sitting   Cuff Size: Adult " "  Pulse: 67   Temp: 97.1 °F (36.2 °C)   TempSrc: Infrared   SpO2: 95%   Weight: 71.6 kg (157 lb 12.8 oz)   Height: 154.9 cm (60.98\")     Body mass index is 29.83 kg/m².  Nursing notes and vitals reviewed.    Physical Exam  Constitutional:       General: She is not in acute distress.     Appearance: She is well-developed.   Pulmonary:      Effort: Pulmonary effort is normal.   Skin:         Neurological:      Mental Status: She is alert.   Psychiatric:         Attention and Perception: She is attentive.         Speech: Speech normal.         Assessment and Plan    Diagnoses and all orders for this visit:    1. Encounter for examination following treatment at hospital (Primary)    2. Laceration of scalp, initial encounter        Suture Removal    Date/Time: 9/5/2023 8:38 AM  Performed by: Alpa Alfaro APRN  Authorized by: Alpa Alfaro APRN   Consent: Verbal consent obtained.  Risks and benefits: risks, benefits and alternatives were discussed  Consent given by: patient  Patient understanding: patient states understanding of the procedure being performed  Body area: head/neck  Location details: scalp  Staples Removed: 2  Patient tolerance: patient tolerated the procedure well with no immediate complications          Medications, including side effects, were discussed with the patient. Patient verbalized understanding.  The plan of care was discussed. All questions were answered. Patient verbalized understanding.        Return if symptoms worsen or fail to improve.  "

## 2023-09-22 ENCOUNTER — TELEPHONE (OUTPATIENT)
Dept: INTERNAL MEDICINE | Facility: CLINIC | Age: 73
End: 2023-09-22

## 2023-09-22 NOTE — TELEPHONE ENCOUNTER
Provider: LOUIS WILLIS    Caller: JENNIFER CHRISTINE    Relationship to Patient: SELF    Pharmacy: [facility]/pharmacy #[id number] - [place], [place] - [address] [phone] [facility] [phone] FX     Phone Number: [phone]     Reason for Call: PATIENT IS HAVING TROUBLE WITH HER STOMACH AND IS WONDERING IF SHE SHOULD BACK OFF OF THE metFORMIN ER (GLUCOPHAGE-XR) 500 MG 24 hr tablet FOR AWHILE TO SEE IF THIS HELPS. SHE IS ALSO PRESCRIBED magnesium oxide (MAGOX) 400 (241.3 Mg) MG tablet tablet WHICH CAN CAUSE DIARRHEA AS WELL, BUT SHE DID NOT TAKE EITHER MEDICATION THIS MORNING. 09/22/23.     PLEASE CALL TO DISCUSS AND ADVISE PATIENT ON NEXT STEP.

## 2023-10-02 ENCOUNTER — TELEPHONE (OUTPATIENT)
Dept: INTERNAL MEDICINE | Facility: CLINIC | Age: 73
End: 2023-10-02

## 2023-10-02 NOTE — TELEPHONE ENCOUNTER
Caller: Debbie Cheng    Relationship to patient: Self    Best call back number: [phone]    Patient is needing: PATIENT STOPPED TAKING METFORMIN, AND DIARRHEA STOPPED. PATIENT HAS STARTED BACK TAKING METFORMIN AND THE DIARRHEA HAS STARTED BACK. PATIENT ASKS IF SHE CAN STOP TAKING THE METFORMIN PERMANENTLY?  PLEASE ADVISE

## 2023-10-04 NOTE — TELEPHONE ENCOUNTER
Yes, hold Metformin due to diarrhea, but set up fasting labs in 2 months and f/u to discuss Diabetes, in case she needs a different medication.

## 2023-10-30 DIAGNOSIS — E11.65 UNCONTROLLED TYPE 2 DIABETES MELLITUS WITH HYPERGLYCEMIA: ICD-10-CM

## 2023-10-30 DIAGNOSIS — E78.2 MIXED HYPERLIPIDEMIA: ICD-10-CM

## 2023-10-30 RX ORDER — METFORMIN HYDROCHLORIDE 500 MG/1
TABLET, EXTENDED RELEASE ORAL
Qty: 90 TABLET | Refills: 1 | Status: SHIPPED | OUTPATIENT
Start: 2023-10-30

## 2023-10-30 RX ORDER — SIMVASTATIN 40 MG
TABLET ORAL
Qty: 90 TABLET | Refills: 3 | Status: SHIPPED | OUTPATIENT
Start: 2023-10-30

## 2023-11-15 ENCOUNTER — TRANSCRIBE ORDERS (OUTPATIENT)
Dept: ADMINISTRATIVE | Facility: HOSPITAL | Age: 73
End: 2023-11-15
Payer: MEDICARE

## 2023-11-15 DIAGNOSIS — E55.9 VITAMIN D DEFICIENCY: ICD-10-CM

## 2023-11-15 DIAGNOSIS — I10 PRIMARY HYPERTENSION: ICD-10-CM

## 2023-11-15 DIAGNOSIS — Z12.31 SCREENING MAMMOGRAM FOR BREAST CANCER: Primary | ICD-10-CM

## 2023-11-15 DIAGNOSIS — E78.2 MIXED HYPERLIPIDEMIA: ICD-10-CM

## 2023-11-15 DIAGNOSIS — E11.9 CONTROLLED TYPE 2 DIABETES MELLITUS WITHOUT COMPLICATION, WITHOUT LONG-TERM CURRENT USE OF INSULIN: ICD-10-CM

## 2023-11-28 ENCOUNTER — OFFICE VISIT (OUTPATIENT)
Dept: CARDIOLOGY | Facility: CLINIC | Age: 73
End: 2023-11-28
Payer: MEDICARE

## 2023-11-28 VITALS
BODY MASS INDEX: 28.7 KG/M2 | HEIGHT: 61 IN | OXYGEN SATURATION: 94 % | SYSTOLIC BLOOD PRESSURE: 118 MMHG | HEART RATE: 59 BPM | WEIGHT: 152 LBS | DIASTOLIC BLOOD PRESSURE: 74 MMHG

## 2023-11-28 DIAGNOSIS — I49.3 PVCS (PREMATURE VENTRICULAR CONTRACTIONS): ICD-10-CM

## 2023-11-28 DIAGNOSIS — I10 PRIMARY HYPERTENSION: ICD-10-CM

## 2023-11-28 DIAGNOSIS — I48.0 PAF (PAROXYSMAL ATRIAL FIBRILLATION): Primary | ICD-10-CM

## 2023-11-28 DIAGNOSIS — I49.1 PREMATURE ATRIAL COMPLEXES: ICD-10-CM

## 2023-11-28 PROBLEM — U07.1 COVID-19 VIRUS INFECTION: Status: RESOLVED | Noted: 2022-11-14 | Resolved: 2023-11-28

## 2023-11-28 LAB
25(OH)D3+25(OH)D2 SERPL-MCNC: 69.8 NG/ML (ref 30–100)
ALBUMIN SERPL-MCNC: 4.5 G/DL (ref 3.5–5.2)
ALBUMIN/GLOB SERPL: 2.3 G/DL
ALP SERPL-CCNC: 70 U/L (ref 39–117)
ALT SERPL-CCNC: 18 U/L (ref 1–33)
AST SERPL-CCNC: 22 U/L (ref 1–32)
BASOPHILS # BLD AUTO: 0.01 10*3/MM3 (ref 0–0.2)
BASOPHILS NFR BLD AUTO: 0.2 % (ref 0–1.5)
BILIRUB SERPL-MCNC: 0.9 MG/DL (ref 0–1.2)
BUN SERPL-MCNC: 19 MG/DL (ref 8–23)
BUN/CREAT SERPL: 23.8 (ref 7–25)
CALCIUM SERPL-MCNC: 9.4 MG/DL (ref 8.6–10.5)
CHLORIDE SERPL-SCNC: 102 MMOL/L (ref 98–107)
CHOLEST SERPL-MCNC: 144 MG/DL (ref 0–200)
CHOLEST/HDLC SERPL: 2.82 {RATIO}
CO2 SERPL-SCNC: 27.4 MMOL/L (ref 22–29)
CREAT SERPL-MCNC: 0.8 MG/DL (ref 0.57–1)
EGFRCR SERPLBLD CKD-EPI 2021: 77.4 ML/MIN/1.73
EOSINOPHIL # BLD AUTO: 0.06 10*3/MM3 (ref 0–0.4)
EOSINOPHIL NFR BLD AUTO: 1.4 % (ref 0.3–6.2)
ERYTHROCYTE [DISTWIDTH] IN BLOOD BY AUTOMATED COUNT: 12.8 % (ref 12.3–15.4)
GLOBULIN SER CALC-MCNC: 2 GM/DL
GLUCOSE SERPL-MCNC: 110 MG/DL (ref 65–99)
HBA1C MFR BLD: 6.5 % (ref 4.8–5.6)
HCT VFR BLD AUTO: 38 % (ref 34–46.6)
HDLC SERPL-MCNC: 51 MG/DL (ref 40–60)
HGB BLD-MCNC: 13.1 G/DL (ref 12–15.9)
IMM GRANULOCYTES # BLD AUTO: 0 10*3/MM3 (ref 0–0.05)
IMM GRANULOCYTES NFR BLD AUTO: 0 % (ref 0–0.5)
LDLC SERPL CALC-MCNC: 72 MG/DL (ref 0–100)
LYMPHOCYTES # BLD AUTO: 1.29 10*3/MM3 (ref 0.7–3.1)
LYMPHOCYTES NFR BLD AUTO: 30.4 % (ref 19.6–45.3)
MCH RBC QN AUTO: 31.1 PG (ref 26.6–33)
MCHC RBC AUTO-ENTMCNC: 34.5 G/DL (ref 31.5–35.7)
MCV RBC AUTO: 90.3 FL (ref 79–97)
MONOCYTES # BLD AUTO: 0.61 10*3/MM3 (ref 0.1–0.9)
MONOCYTES NFR BLD AUTO: 14.4 % (ref 5–12)
NEUTROPHILS # BLD AUTO: 2.28 10*3/MM3 (ref 1.7–7)
NEUTROPHILS NFR BLD AUTO: 53.6 % (ref 42.7–76)
NRBC BLD AUTO-RTO: 0 /100 WBC (ref 0–0.2)
PLATELET # BLD AUTO: 213 10*3/MM3 (ref 140–450)
POTASSIUM SERPL-SCNC: 3.8 MMOL/L (ref 3.5–5.2)
PROT SERPL-MCNC: 6.5 G/DL (ref 6–8.5)
RBC # BLD AUTO: 4.21 10*6/MM3 (ref 3.77–5.28)
SODIUM SERPL-SCNC: 138 MMOL/L (ref 136–145)
TRIGL SERPL-MCNC: 120 MG/DL (ref 0–150)
VLDLC SERPL CALC-MCNC: 21 MG/DL (ref 5–40)
WBC # BLD AUTO: 4.25 10*3/MM3 (ref 3.4–10.8)

## 2023-11-28 PROCEDURE — 99213 OFFICE O/P EST LOW 20 MIN: CPT | Performed by: INTERNAL MEDICINE

## 2023-11-28 PROCEDURE — 3078F DIAST BP <80 MM HG: CPT | Performed by: INTERNAL MEDICINE

## 2023-11-28 PROCEDURE — 3074F SYST BP LT 130 MM HG: CPT | Performed by: INTERNAL MEDICINE

## 2023-11-28 PROCEDURE — 1160F RVW MEDS BY RX/DR IN RCRD: CPT | Performed by: INTERNAL MEDICINE

## 2023-11-28 PROCEDURE — 1159F MED LIST DOCD IN RCRD: CPT | Performed by: INTERNAL MEDICINE

## 2023-11-28 PROCEDURE — 93000 ELECTROCARDIOGRAM COMPLETE: CPT | Performed by: INTERNAL MEDICINE

## 2023-11-28 NOTE — PROGRESS NOTES
"Date of Office Visit: 2023  Encounter Provider: Keon Mann MD  Place of Service: [facility] CARDIOLOGY  Patient Name: Debbie Cheng  :1950    Chief Complaint   Patient presents with    PAF (paroxysmal atrial fibrillation)   :     HPI: Debbie Cheng is a 73 y.o. female who presents today to followup. She is an extremely pleasant lady who had one episode of highly symptomatic atrial fibrillation in May 2015. She converted on her own. She was started on rivaroxaban and metoprolol. In 2015, I stopped the NOAC when it was clear that she had experienced no recurrence.     In 2021, she reported palpitations that sounded like premature ectopics; a Holter showed a 4% burden of of PACs and a 4% burden of PVCs. An echo showed normal LV size and systolic function, mild-moderate LAE, and mild-moderate MR.     In , she reported an increased frequency of palpitations; they were not consistent with her previous PAF. She felt skips and a pause. She went to the ED, and was noted to have PVCs. She was reassured; her symptoms have improved. She was told that her \"heart was enlarged.\" I reviewed her CXR and did not appreciate significant cardiomegalky.     She has not chest pain or dyspnea. She has no pedal edema or orthopnea. She has had no syncope. Her palpitations are stable and mild.     Past Medical History:   Diagnosis Date    Acute bacterial conjunctivitis of left eye 10/20/2016    Allergic rhinitis     Arrhythmia     Asthma     Bronchitis     Cancer     Basel Cell and Squamous cell    Colon polyp     Coronary artery disease Afib    Hyperlipidemia     Hypertension     PAF (paroxysmal atrial fibrillation)     one episode, 2015; was briefly treated with Xarelto    Pneumonia     Premature atrial complexes 2022    PVCs (premature ventricular contractions) 2022    Rotator cuff tendonitis     Type 2 diabetes mellitus     UTI (urinary tract infection)     " Vitamin D deficiency        Past Surgical History:   Procedure Laterality Date     SECTION      CHOLECYSTECTOMY      COLONOSCOPY      EYE SURGERY Bilateral     cataract surgery    GUM SURGERY  2017    HYSTERECTOMY      KNEE SURGERY         Social History     Socioeconomic History    Marital status:    Tobacco Use    Smoking status: Never     Passive exposure: Never    Smokeless tobacco: Never    Tobacco comments:     Father was a geavy smoker   Vaping Use    Vaping Use: Never used   Substance and Sexual Activity    Alcohol use: No    Drug use: No    Sexual activity: Yes     Partners: Male       Family History   Problem Relation Age of Onset    Stroke Mother     Hypertension Mother     Heart disease Father     Heart attack Father     Hypertension Sister     Colon polyps Sister     Heart disease Brother     Cancer Paternal Grandmother         breast    Breast cancer Paternal Grandmother     Heart disease Paternal Grandfather     Diabetes Paternal Grandfather        Review of Systems   Constitutional: Negative for malaise/fatigue.   Cardiovascular:  Positive for palpitations. Negative for chest pain.   Respiratory:  Negative for shortness of breath.    Musculoskeletal:  Positive for joint pain.   Neurological:  Negative for light-headedness.   All other systems reviewed and are negative.      Allergies   Allergen Reactions    Penicillins Anaphylaxis and Swelling    Erythromycin Other (See Comments)     JOINT PAIN AND SWELLING    Astelin [Azelastine] Cough         Current Outpatient Medications:     fluticasone (FLONASE) 50 MCG/ACT nasal spray, 2 sprays into the nostril(s) as directed by provider Daily. Administer 2 sprays in each nostril for each dose., Disp: , Rfl:     glucose blood (OneTouch Ultra) test strip, 1 each by Other route Daily. Use as instructed, Disp: 100 each, Rfl: 3    glucose monitor monitoring kit, Use glucose meter to check fasting glucose once daily for diabetes monitoring. E11.9,  "Disp: 1 each, Rfl: 0    levocetirizine (XYZAL) 5 MG tablet, Take 1 tablet by mouth Every Morning., Disp: , Rfl:     magnesium oxide (MAGOX) 400 (241.3 Mg) MG tablet tablet, Take 1 tablet by mouth Daily., Disp: , Rfl:     metoprolol tartrate (LOPRESSOR) 25 MG tablet, TAKE 1 TABLET BY MOUTH TWICE A DAY, Disp: 180 tablet, Rfl: 1    montelukast (SINGULAIR) 10 MG tablet, TAKE 1 TABLET BY MOUTH EVERY DAY AT NIGHT, Disp: 90 tablet, Rfl: 3    multivitamin with minerals tablet tablet, Take 1 tablet by mouth Daily., Disp: , Rfl:     OneTouch Delica Lancets 33G misc, USE ONE LANCET DAILY TO CHECK FASTING GLUCOSE FOR DIABETES MONITORING. E11.9, Disp: 100 each, Rfl: 12    simvastatin (ZOCOR) 40 MG tablet, TAKE 1 TABLET BY MOUTH EVERY DAY AT NIGHT, Disp: 90 tablet, Rfl: 3    valsartan-hydrochlorothiazide (DIOVAN-HCT) 320-12.5 MG per tablet, TAKE 1 TABLET BY MOUTH EVERY DAY, Disp: 90 tablet, Rfl: 3    VENTOLIN  (90 Base) MCG/ACT inhaler, USE 2 PUFFS EVERY 4-6 HOURS AS NEEDED FOR COUGHING ,WHEEZING OR SHORTNESS OF BREATH., Disp: , Rfl: 3    vitamin D (ERGOCALCIFEROL) 1.25 MG (18435 UT) capsule capsule, TAKE 1 CAPSULE BY MOUTH ONE TIME PER WEEK, Disp: 12 capsule, Rfl: 3    Xarelto 20 MG tablet, TAKE 1 TABLET BY MOUTH EVERY DAY, Disp: 90 tablet, Rfl: 3    metFORMIN ER (GLUCOPHAGE-XR) 500 MG 24 hr tablet, TAKE 1 TABLET BY MOUTH EVERY DAY WITH BREAKFAST (Patient not taking: Reported on 11/28/2023), Disp: 90 tablet, Rfl: 1     Objective:     Vitals:    11/28/23 1008   BP: 118/74   BP Location: Right arm   Patient Position: Sitting   Cuff Size: Adult   Pulse: 59   SpO2: 94%   Weight: 68.9 kg (152 lb)   Height: 154.9 cm (61\")       Body mass index is 28.72 kg/m².    Physical Exam  Vitals reviewed.   Constitutional:       Appearance: She is well-developed.   HENT:      Head: Normocephalic.      Nose: Nose normal.      Mouth/Throat:      Pharynx: Oropharynx is clear.   Eyes:      Conjunctiva/sclera: Conjunctivae normal.   Neck:      " Vascular: No JVD.   Cardiovascular:      Rate and Rhythm: Normal rate and regular rhythm.      Pulses: Normal pulses and intact distal pulses.      Heart sounds: Normal heart sounds. No murmur heard.  Pulmonary:      Effort: Pulmonary effort is normal.      Breath sounds: Normal breath sounds.   Abdominal:      Palpations: Abdomen is soft.      Tenderness: There is no abdominal tenderness.   Musculoskeletal:         General: No swelling. Normal range of motion.      Cervical back: Normal range of motion.   Lymphadenopathy:      Cervical: No cervical adenopathy.   Skin:     General: Skin is warm and dry.      Findings: No rash.   Neurological:      General: No focal deficit present.      Mental Status: She is alert and oriented to person, place, and time.      Cranial Nerves: No cranial nerve deficit.   Psychiatric:         Mood and Affect: Mood normal.         Behavior: Behavior normal.         Thought Content: Thought content normal.           ECG 12 Lead    Date/Time: 11/28/2023 10:16 AM  Performed by: Keon Mann MD    Authorized by: Keon Mann MD  Comparison: compared with previous ECG   Similar to previous ECG  Rhythm: sinus rhythm  Conduction: conduction normal  ST Segments: ST segments normal  T Waves: T waves normal  QRS axis: normal  Other: no other findings    Clinical impression: normal ECG            Assessment:       Diagnosis Plan   1. PAF (paroxysmal atrial fibrillation)        2. Premature atrial complexes        3. PVCs (premature ventricular contractions)        4. Primary hypertension                 Plan:       1.  She had one highly symptomatic AF episode and has had no evidence of recurrence.  She will remain on metoprolol.  As her left atrium is dilated and her CHADSVASC is 3, I have recommended she remain on AC.     2/3.  A monitor in 2021 showed a 4% burden (H) of premature atrial and premature ventricular contractions.  Her palpitations are consistent with PVCs.  She can definitely  tell the difference between these and atrial fibrillation.  If these were to happen again, she could take an extra dose of metoprolol or magnesium, and I encouraged her to get up and move around, as increasing her heart rate will often make them go away.  We talked about the generally benign nature of these.    4. Her BP is well controlled.     4.  Her BP is within goal.    5.  I personally reviewed her x-ray and I feel that her heart is normal in size on that study.  She also had an echo last year that revealed no evidence of left ventricular enlargement or LVH.  She did have a little bit of left atrial dilation but it was not severe.    Sincerely,       Keon Mann MD

## 2023-12-03 DIAGNOSIS — E55.9 VITAMIN D DEFICIENCY: ICD-10-CM

## 2023-12-04 ENCOUNTER — OFFICE VISIT (OUTPATIENT)
Dept: INTERNAL MEDICINE | Facility: CLINIC | Age: 73
End: 2023-12-04
Payer: MEDICARE

## 2023-12-04 VITALS
OXYGEN SATURATION: 96 % | BODY MASS INDEX: 29.23 KG/M2 | HEIGHT: 61 IN | SYSTOLIC BLOOD PRESSURE: 118 MMHG | HEART RATE: 56 BPM | TEMPERATURE: 98 F | DIASTOLIC BLOOD PRESSURE: 68 MMHG | WEIGHT: 154.8 LBS

## 2023-12-04 DIAGNOSIS — I10 ESSENTIAL HYPERTENSION: ICD-10-CM

## 2023-12-04 DIAGNOSIS — E11.9 CONTROLLED TYPE 2 DIABETES MELLITUS WITHOUT COMPLICATION, WITHOUT LONG-TERM CURRENT USE OF INSULIN: Primary | ICD-10-CM

## 2023-12-04 DIAGNOSIS — I48.0 PAF (PAROXYSMAL ATRIAL FIBRILLATION): ICD-10-CM

## 2023-12-04 DIAGNOSIS — E78.2 MIXED HYPERLIPIDEMIA: ICD-10-CM

## 2023-12-04 DIAGNOSIS — I10 PRIMARY HYPERTENSION: ICD-10-CM

## 2023-12-04 RX ORDER — ERGOCALCIFEROL 1.25 MG/1
CAPSULE ORAL
Qty: 12 CAPSULE | Refills: 3 | Status: SHIPPED | OUTPATIENT
Start: 2023-12-04

## 2023-12-04 NOTE — PROGRESS NOTES
Chief Complaint   Patient presents with    Diabetes     Follow up       Subjective     Debbie Cheng is a 73 y.o. female being seen for a follow up appointment today regarding Type 2 DM, PAF, HTN, Hyperlipidemia, Vit D def, and IFG. She is followed by Johnathan Ervin for PAF. She is on Xarelto 20mg daily and lopressor 25mg 2 times daily      She is currently on Metformin XR 500mg daily for DM2. She has been checking her glucose fasting and post prandial, running 100-134. She has reduced portions and reduced carbs. She has been exercising by walking daily, [employer] and senior exercise class. She was having stool urgency form Metformin, so she stopped it. She has lost 12 pounds.      She is on Diovan /12.5 mg and Lopressor 25mg BID for HTN and rate control. She is tolerating it well. BP at home 110s/80s. She has been doing [employer], living well classes, and walking for exercise.      She is on Zocor 40mg nightly for cholesterol. She tolerates this well.      She has been having seasonal allergies. She is on Xyzal 5mg, Singulair 10mg and Flonase.     She has Vit D def, and takes a weekly supplement.          Diabetes  Pertinent negatives for hypoglycemia include no confusion, headaches, speech difficulty or tremors. Pertinent negatives for diabetes include no chest pain, no polydipsia and no polyuria.        Allergies   Allergen Reactions    Penicillins Anaphylaxis and Swelling    Erythromycin Other (See Comments)     JOINT PAIN AND SWELLING    Astelin [Azelastine] Cough         Current Outpatient Medications:     fluticasone (FLONASE) 50 MCG/ACT nasal spray, 2 sprays into the nostril(s) as directed by provider Daily. Administer 2 sprays in each nostril for each dose., Disp: , Rfl:     glucose blood (OneTouch Ultra) test strip, 1 each by Other route Daily. Use as instructed, Disp: 100 each, Rfl: 3    glucose monitor monitoring kit, Use glucose meter to check fasting glucose once daily for diabetes monitoring. E11.9, Disp:  1 each, Rfl: 0    levocetirizine (XYZAL) 5 MG tablet, Take 1 tablet by mouth Every Morning., Disp: , Rfl:     magnesium oxide (MAGOX) 400 (241.3 Mg) MG tablet tablet, Take 1 tablet by mouth Daily., Disp: , Rfl:     metoprolol tartrate (LOPRESSOR) 25 MG tablet, TAKE 1 TABLET BY MOUTH TWICE A DAY, Disp: 180 tablet, Rfl: 1    montelukast (SINGULAIR) 10 MG tablet, TAKE 1 TABLET BY MOUTH EVERY DAY AT NIGHT, Disp: 90 tablet, Rfl: 3    multivitamin with minerals tablet tablet, Take 1 tablet by mouth Daily., Disp: , Rfl:     OneTouch Delica Lancets 33G misc, USE ONE LANCET DAILY TO CHECK FASTING GLUCOSE FOR DIABETES MONITORING. E11.9, Disp: 100 each, Rfl: 12    simvastatin (ZOCOR) 40 MG tablet, TAKE 1 TABLET BY MOUTH EVERY DAY AT NIGHT, Disp: 90 tablet, Rfl: 3    valsartan-hydrochlorothiazide (DIOVAN-HCT) 320-12.5 MG per tablet, TAKE 1 TABLET BY MOUTH EVERY DAY, Disp: 90 tablet, Rfl: 3    VENTOLIN  (90 Base) MCG/ACT inhaler, USE 2 PUFFS EVERY 4-6 HOURS AS NEEDED FOR COUGHING ,WHEEZING OR SHORTNESS OF BREATH., Disp: , Rfl: 3    vitamin D (ERGOCALCIFEROL) 1.25 MG (44877 UT) capsule capsule, TAKE 1 CAPSULE BY MOUTH ONE TIME PER WEEK, Disp: 12 capsule, Rfl: 3    Xarelto 20 MG tablet, TAKE 1 TABLET BY MOUTH EVERY DAY, Disp: 90 tablet, Rfl: 3    metFORMIN ER (GLUCOPHAGE-XR) 500 MG 24 hr tablet, TAKE 1 TABLET BY MOUTH EVERY DAY WITH BREAKFAST (Patient not taking: Reported on 12/4/2023), Disp: 90 tablet, Rfl: 1    The following portions of the patient's history were reviewed and updated as appropriate: allergies, current medications, past family history, past medical history, past social history, past surgical history, and problem list.    Review of Systems   Constitutional: Negative.    HENT: Negative.     Respiratory: Negative.     Cardiovascular: Negative.  Negative for chest pain, palpitations and leg swelling.   Endocrine: Negative for polydipsia and polyuria.   Musculoskeletal: Negative.    Neurological:  Negative for  tremors, speech difficulty and headaches.   Hematological: Negative.    Psychiatric/Behavioral: Negative.  Negative for confusion.    All other systems reviewed and are negative.      Assessment     Physical Exam  Vitals reviewed.   Constitutional:       Appearance: Normal appearance. She is not ill-appearing.   HENT:      Right Ear: Decreased hearing noted.      Left Ear: Decreased hearing noted.      Ears:      Comments: Wearing hearing aides  Cardiovascular:      Rate and Rhythm: Normal rate and regular rhythm.      Pulses: Normal pulses.      Heart sounds: Normal heart sounds. No murmur heard.  Pulmonary:      Effort: Pulmonary effort is normal. No respiratory distress.      Breath sounds: Normal breath sounds. No stridor.   Musculoskeletal:      Right lower leg: No edema.      Left lower leg: No edema.   Skin:     General: Skin is warm and dry.   Neurological:      General: No focal deficit present.      Mental Status: She is alert and oriented to person, place, and time.   Psychiatric:         Mood and Affect: Mood normal.         Behavior: Behavior normal.         Thought Content: Thought content normal.         Plan     Her fasting labs were reviewed with the patient from last week.     Diagnoses and all orders for this visit:    1. Controlled type 2 diabetes mellitus without complication, without long-term current use of insulin (Primary)  Comments:  Hgb A1c at goal with diet. Stop Metformin  Orders:  -     CBC & Differential; Future  -     Comprehensive Metabolic Panel; Future  -     Lipid Panel With / Chol / HDL Ratio; Future  -     Hemoglobin A1c; Future    2. PAF (paroxysmal atrial fibrillation)  Comments:  On Lopressor and Xarelto    3. Primary hypertension  Comments:  Bp at goal  Orders:  -     CBC & Differential; Future  -     Comprehensive Metabolic Panel; Future  -     Lipid Panel With / Chol / HDL Ratio; Future    4. Mixed hyperlipidemia  Comments:  LDL at goal on Zocor 40mg nightly  Orders:  -      CBC & Differential; Future  -     Comprehensive Metabolic Panel; Future  -     Lipid Panel With / Chol / HDL Ratio; Future        F/U as scheduled

## 2023-12-06 RX ORDER — VALSARTAN AND HYDROCHLOROTHIAZIDE 320; 12.5 MG/1; MG/1
TABLET, FILM COATED ORAL
Qty: 90 TABLET | Refills: 3 | Status: SHIPPED | OUTPATIENT
Start: 2023-12-06

## 2023-12-07 ENCOUNTER — HOSPITAL ENCOUNTER (OUTPATIENT)
Dept: MAMMOGRAPHY | Facility: HOSPITAL | Age: 73
Discharge: HOME | End: 2023-12-07
Admitting: NURSE PRACTITIONER
Payer: MEDICARE

## 2023-12-07 DIAGNOSIS — Z12.31 SCREENING MAMMOGRAM FOR BREAST CANCER: ICD-10-CM

## 2023-12-07 PROCEDURE — 77067 SCR MAMMO BI INCL CAD: CPT

## 2023-12-07 PROCEDURE — 77063 BREAST TOMOSYNTHESIS BI: CPT

## 2024-01-17 RX ORDER — RIVAROXABAN 20 MG/1
TABLET, FILM COATED ORAL
Qty: 90 TABLET | Refills: 3 | Status: SHIPPED | OUTPATIENT
Start: 2024-01-17

## 2024-01-19 DIAGNOSIS — E11.65 UNCONTROLLED TYPE 2 DIABETES MELLITUS WITH HYPERGLYCEMIA: ICD-10-CM

## 2024-01-19 RX ORDER — BLOOD SUGAR DIAGNOSTIC
STRIP MISCELLANEOUS
Qty: 100 EACH | Refills: 3 | Status: SHIPPED | OUTPATIENT
Start: 2024-01-19

## 2024-02-07 DIAGNOSIS — E78.2 MIXED HYPERLIPIDEMIA: ICD-10-CM

## 2024-02-07 DIAGNOSIS — I10 PRIMARY HYPERTENSION: ICD-10-CM

## 2024-02-07 DIAGNOSIS — E11.9 CONTROLLED TYPE 2 DIABETES MELLITUS WITHOUT COMPLICATION, WITHOUT LONG-TERM CURRENT USE OF INSULIN: ICD-10-CM

## 2024-02-14 LAB
ALBUMIN SERPL-MCNC: 4.3 G/DL (ref 3.8–4.8)
ALBUMIN/GLOB SERPL: 1.8 {RATIO} (ref 1.2–2.2)
ALP SERPL-CCNC: 68 IU/L (ref 44–121)
ALT SERPL-CCNC: 18 IU/L (ref 0–32)
AST SERPL-CCNC: 20 IU/L (ref 0–40)
BASOPHILS # BLD AUTO: 0 X10E3/UL (ref 0–0.2)
BASOPHILS NFR BLD AUTO: 0 %
BILIRUB SERPL-MCNC: 0.9 MG/DL (ref 0–1.2)
BUN SERPL-MCNC: 25 MG/DL (ref 8–27)
BUN/CREAT SERPL: 27 (ref 12–28)
CALCIUM SERPL-MCNC: 9.2 MG/DL (ref 8.7–10.3)
CHLORIDE SERPL-SCNC: 102 MMOL/L (ref 96–106)
CHOLEST SERPL-MCNC: 156 MG/DL (ref 100–199)
CHOLEST/HDLC SERPL: 2.8 RATIO (ref 0–4.4)
CO2 SERPL-SCNC: 23 MMOL/L (ref 20–29)
CREAT SERPL-MCNC: 0.92 MG/DL (ref 0.57–1)
EGFRCR SERPLBLD CKD-EPI 2021: 65 ML/MIN/1.73
EOSINOPHIL # BLD AUTO: 0.1 X10E3/UL (ref 0–0.4)
EOSINOPHIL NFR BLD AUTO: 2 %
ERYTHROCYTE [DISTWIDTH] IN BLOOD BY AUTOMATED COUNT: 13.1 % (ref 11.7–15.4)
GLOBULIN SER CALC-MCNC: 2.4 G/DL (ref 1.5–4.5)
GLUCOSE SERPL-MCNC: 108 MG/DL (ref 70–99)
HBA1C MFR BLD: 6.5 % (ref 4.8–5.6)
HCT VFR BLD AUTO: 39.4 % (ref 34–46.6)
HDLC SERPL-MCNC: 55 MG/DL
HGB BLD-MCNC: 13.1 G/DL (ref 11.1–15.9)
IMM GRANULOCYTES # BLD AUTO: 0 X10E3/UL (ref 0–0.1)
IMM GRANULOCYTES NFR BLD AUTO: 0 %
LDLC SERPL CALC-MCNC: 80 MG/DL (ref 0–99)
LYMPHOCYTES # BLD AUTO: 1.3 X10E3/UL (ref 0.7–3.1)
LYMPHOCYTES NFR BLD AUTO: 28 %
MCH RBC QN AUTO: 30.9 PG (ref 26.6–33)
MCHC RBC AUTO-ENTMCNC: 33.2 G/DL (ref 31.5–35.7)
MCV RBC AUTO: 93 FL (ref 79–97)
MONOCYTES # BLD AUTO: 0.7 X10E3/UL (ref 0.1–0.9)
MONOCYTES NFR BLD AUTO: 15 %
NEUTROPHILS # BLD AUTO: 2.6 X10E3/UL (ref 1.4–7)
NEUTROPHILS NFR BLD AUTO: 55 %
PLATELET # BLD AUTO: 225 X10E3/UL (ref 150–450)
POTASSIUM SERPL-SCNC: 4 MMOL/L (ref 3.5–5.2)
PROT SERPL-MCNC: 6.7 G/DL (ref 6–8.5)
RBC # BLD AUTO: 4.24 X10E6/UL (ref 3.77–5.28)
SODIUM SERPL-SCNC: 141 MMOL/L (ref 134–144)
TRIGL SERPL-MCNC: 116 MG/DL (ref 0–149)
VLDLC SERPL CALC-MCNC: 21 MG/DL (ref 5–40)
WBC # BLD AUTO: 4.7 X10E3/UL (ref 3.4–10.8)

## 2024-02-21 ENCOUNTER — OFFICE VISIT (OUTPATIENT)
Dept: INTERNAL MEDICINE | Facility: CLINIC | Age: 74
End: 2024-02-21
Payer: MEDICARE

## 2024-02-21 VITALS
WEIGHT: 154.4 LBS | HEART RATE: 59 BPM | OXYGEN SATURATION: 96 % | DIASTOLIC BLOOD PRESSURE: 78 MMHG | BODY MASS INDEX: 29.15 KG/M2 | HEIGHT: 61 IN | TEMPERATURE: 97.7 F | SYSTOLIC BLOOD PRESSURE: 134 MMHG

## 2024-02-21 DIAGNOSIS — Z00.00 ENCOUNTER FOR SUBSEQUENT ANNUAL WELLNESS VISIT (AWV) IN MEDICARE PATIENT: Primary | ICD-10-CM

## 2024-02-21 DIAGNOSIS — E78.2 MIXED HYPERLIPIDEMIA: ICD-10-CM

## 2024-02-21 DIAGNOSIS — I10 PRIMARY HYPERTENSION: ICD-10-CM

## 2024-02-21 DIAGNOSIS — Z78.0 ENCOUNTER FOR OSTEOPOROSIS SCREENING IN ASYMPTOMATIC POSTMENOPAUSAL PATIENT: ICD-10-CM

## 2024-02-21 DIAGNOSIS — Z13.820 ENCOUNTER FOR OSTEOPOROSIS SCREENING IN ASYMPTOMATIC POSTMENOPAUSAL PATIENT: ICD-10-CM

## 2024-02-21 DIAGNOSIS — E11.9 CONTROLLED TYPE 2 DIABETES MELLITUS WITHOUT COMPLICATION, WITHOUT LONG-TERM CURRENT USE OF INSULIN: ICD-10-CM

## 2024-02-21 DIAGNOSIS — I48.0 PAF (PAROXYSMAL ATRIAL FIBRILLATION): ICD-10-CM

## 2024-02-21 DIAGNOSIS — I48.0 PAROXYSMAL ATRIAL FIBRILLATION: ICD-10-CM

## 2024-02-21 LAB
EXPIRATION DATE: NORMAL
Lab: NORMAL
POC CREATININE URINE: NORMAL
POC MICROALBUMIN URINE: NORMAL

## 2024-02-21 RX ORDER — ROSUVASTATIN CALCIUM 20 MG/1
20 TABLET, COATED ORAL DAILY
Qty: 90 TABLET | Refills: 1 | Status: SHIPPED | OUTPATIENT
Start: 2024-02-21

## 2024-02-21 NOTE — PROGRESS NOTES
The ABCs of the Annual Wellness Visit  Subsequent Medicare Wellness Visit    Subjective    Debbie Cheng is a 73 y.o. female who presents for a Subsequent Medicare Wellness Visit.    The following portions of the patient's history were reviewed and   updated as appropriate: allergies, current medications, past family history, past medical history, past social history, past surgical history, and problem list.    Compared to one year ago, the patient feels her physical   health is better.    Compared to one year ago, the patient feels her mental   health is better.    Recent Hospitalizations:  She was not admitted to the hospital during the last year.       Current Medical Providers:  Patient Care Team:  Terra Reddy APRN as PCP - General (Family Medicine)  Trace Onofre MD as Consulting Physician (Ophthalmology)  Keon Mann MD as Consulting Physician (Cardiology)  Parrish Oliver MD as Consulting Physician (Orthopedic Surgery)  Emily Aranda MD as Consulting Physician (Dermatology)    Outpatient Medications Prior to Visit   Medication Sig Dispense Refill    fluticasone (FLONASE) 50 MCG/ACT nasal spray 2 sprays into the nostril(s) as directed by provider Daily. Administer 2 sprays in each nostril for each dose.      glucose blood (OneTouch Ultra) test strip USE TO CHECK BLOOD SUGAR DAILY. USE AS INSTRUCTED.(E11.65,KS) 100 each 3    glucose monitor monitoring kit Use glucose meter to check fasting glucose once daily for diabetes monitoring. E11.9 1 each 0    levocetirizine (XYZAL) 5 MG tablet Take 1 tablet by mouth Every Morning.      magnesium oxide (MAGOX) 400 (241.3 Mg) MG tablet tablet Take 1 tablet by mouth Daily.      metoprolol tartrate (LOPRESSOR) 25 MG tablet TAKE 1 TABLET BY MOUTH TWICE A  tablet 1    montelukast (SINGULAIR) 10 MG tablet TAKE 1 TABLET BY MOUTH EVERY DAY AT NIGHT 90 tablet 3    multivitamin with minerals tablet tablet Take 1 tablet by mouth Daily.       OneTouch Delica Lancets 33G misc USE ONE LANCET DAILY TO CHECK FASTING GLUCOSE FOR DIABETES MONITORING. E11.9 100 each 12    simvastatin (ZOCOR) 40 MG tablet TAKE 1 TABLET BY MOUTH EVERY DAY AT NIGHT 90 tablet 3    valsartan-hydrochlorothiazide (DIOVAN-HCT) 320-12.5 MG per tablet TAKE 1 TABLET BY MOUTH EVERY DAY 90 tablet 3    VENTOLIN  (90 Base) MCG/ACT inhaler USE 2 PUFFS EVERY 4-6 HOURS AS NEEDED FOR COUGHING ,WHEEZING OR SHORTNESS OF BREATH.  3    vitamin D (ERGOCALCIFEROL) 1.25 MG (05357 UT) capsule capsule TAKE 1 CAPSULE BY MOUTH ONE TIME PER WEEK 12 capsule 3    Xarelto 20 MG tablet TAKE 1 TABLET BY MOUTH EVERY DAY 90 tablet 3     No facility-administered medications prior to visit.       No opioid medication identified on active medication list. I have reviewed chart for other potential  high risk medication/s and harmful drug interactions in the elderly.        Aspirin is not on active medication list.  Aspirin use is contraindicated for this patient due to: current use of Xarelto.  .    Patient Active Problem List   Diagnosis    PAF (paroxysmal atrial fibrillation)    Hypertension    Vitamin D deficiency    Controlled type 2 diabetes mellitus without complication, without long-term current use of insulin    Encounter for subsequent annual wellness visit (AWV) in Medicare patient    Hyperlipidemia    Eczema    Anemia of unknown etiology    Postmenopausal    Varicose veins of both lower extremities with pain    Family history of renal cancer    Squamous cell carcinoma in situ (SCCIS) of skin of left upper arm    Environmental allergies    Onychia of toe of left foot    PVCs (premature ventricular contractions)    Premature atrial complexes    FH: ovarian cancer     Advance Care Planning   Advance Care Planning     Advance Directive is not on file.  ACP discussion was held with the patient during this visit. Patient has an advance directive (not in EMR), copy requested.     Objective    Vitals:     "24 1019   BP: 134/78   BP Location: Left arm   Patient Position: Sitting   Cuff Size: Adult   Pulse: 59   Temp: 97.7 °F (36.5 °C)   TempSrc: Infrared   SpO2: 96%   Weight: 70 kg (154 lb 6.4 oz)   Height: 154.9 cm (61\")   PainSc: 0-No pain     Estimated body mass index is 29.17 kg/m² as calculated from the following:    Height as of this encounter: 154.9 cm (61\").    Weight as of this encounter: 70 kg (154 lb 6.4 oz).    BMI is >= 25 and <30. (Overweight) The following options were offered after discussion;: exercise counseling/recommendations and nutrition counseling/recommendations      Does the patient have evidence of cognitive impairment? No    Lab Results   Component Value Date    CHLPL 156 2024    TRIG 116 2024    HDL 55 2024    LDL 80 2024    VLDL 21 2024    HGBA1C 6.5 (H) 2024        HEALTH RISK ASSESSMENT    Smoking Status:  Social History     Tobacco Use   Smoking Status Never    Passive exposure: Never   Smokeless Tobacco Never   Tobacco Comments    Father was a [employer] smoker     Alcohol Consumption:  Social History     Substance and Sexual Activity   Alcohol Use No     Fall Risk Screen:    STEADI Fall Risk Assessment was completed, and patient is at LOW risk for falls.Assessment completed on:2024    Depression Screenin/21/2024    10:18 AM   PHQ-2/PHQ-9 Depression Screening   Little Interest or Pleasure in Doing Things 0-->not at all   Feeling Down, Depressed or Hopeless 0-->not at all   PHQ-9: Brief Depression Severity Measure Score 0       Health Habits and Functional and Cognitive Screenin/21/2024    10:16 AM   Functional & Cognitive Status   Do you have difficulty preparing food and eating? No   Do you have difficulty bathing yourself, getting dressed or grooming yourself? No   Do you have difficulty using the toilet? No   Do you have difficulty moving around from place to place? No   Do you have trouble with steps or getting out of a bed " or a chair? No   Current Diet Well Balanced Diet   Dental Exam Up to date   Eye Exam Up to date   Exercise (times per week) 7 times per week   Current Exercises Include Walking   Do you need help using the phone?  No   Are you deaf or do you have serious difficulty hearing?  No   Do you need help to go to places out of walking distance? No   Do you need help shopping? No   Do you need help preparing meals?  No   Do you need help with housework?  No   Do you need help with laundry? No   Do you need help taking your medications? No   Do you need help managing money? No   Do you ever drive or ride in a car without wearing a seat belt? No   Have you felt unusual stress, anger or loneliness in the last month? Yes   Who do you live with? Spouse   If you need help, do you have trouble finding someone available to you? No   Do you have difficulty concentrating, remembering or making decisions? No       Age-appropriate Screening Schedule:  Refer to the list below for future screening recommendations based on patient's age, sex and/or medical conditions. Orders for these recommended tests are listed in the plan section. The patient has been provided with a written plan.    Health Maintenance   Topic Date Due    RSV Vaccine - Adults (1 - 1-dose 60+ series) Never done    DXA SCAN  10/25/2023    ANNUAL WELLNESS VISIT  11/18/2023    URINE MICROALBUMIN  11/18/2023    BMI FOLLOWUP  11/18/2023    COVID-19 Vaccine (7 - 2023-24 season) 05/12/2024 (Originally 11/16/2023)    DIABETIC EYE EXAM  07/10/2024    HEMOGLOBIN A1C  08/13/2024    COLORECTAL CANCER SCREENING  11/04/2024    LIPID PANEL  02/13/2025    MAMMOGRAM  12/07/2025    TDAP/TD VACCINES (4 - Td or Tdap) 08/26/2026    HEPATITIS C SCREENING  Completed    INFLUENZA VACCINE  Completed    Pneumococcal Vaccine 65+  Completed    ZOSTER VACCINE  Completed    DIABETIC FOOT EXAM  Discontinued                  CMS Preventative Services Quick Reference  Risk Factors Identified During  "Encounter  Hearing Problem:  has hearing aides  Immunizations Discussed/Encouraged: RSV (Respiratory Syncytial Virus)  The above risks/problems have been discussed with the patient.  Pertinent information has been shared with the patient in the After Visit Summary.  An After Visit Summary and PPPS were made available to the patient.    Follow Up:   Next Medicare Wellness visit to be scheduled in 1 year.       Additional E&M Note during same encounter follows:  Patient has multiple medical problems which are significant and separately identifiable that require additional work above and beyond the Medicare Wellness Visit.      Chief Complaint  Medicare Wellness-subsequent    Subjective        HPI  Debbie Cheng is also being seen today for Type 2 DM, PAF, HTN, Hyperlipidemia, Vit D def, and IFG. She is followed by Johnathan Ervin for PAF. She is on Xarelto 20mg daily and lopressor 25mg 2 times daily      She is was on Metformin XR 500mg daily for DM2. She has been checking her glucose fasting and post prandial, running 100-134. She has reduced portions and reduced carbs. She has been exercising by walking daily, [employer] and senior exercise class. She was having stool urgency form Metformin, so she stopped it. She has lost 12 pounds.      She is on Diovan /12.5 mg and Lopressor 25mg BID for HTN and rate control. She is tolerating it well. BP at home 120-130s/80s. She has been doing [employer], living well classes, and walking for exercise.      She is on Zocor 40mg nightly for cholesterol. She tolerates this well.      She has been having seasonal allergies. She is on Xyzal 5mg, Singulair 10mg and Flonase.     She has Vit D def, and takes a weekly supplement.          Objective   Vital Signs:  /78 (BP Location: Left arm, Patient Position: Sitting, Cuff Size: Adult)   Pulse 59   Temp 97.7 °F (36.5 °C) (Infrared)   Ht 154.9 cm (61\")   Wt 70 kg (154 lb 6.4 oz)   SpO2 96%   BMI 29.17 kg/m²     Physical Exam  Vitals " reviewed.   Constitutional:       Appearance: Normal appearance. She is not ill-appearing.   HENT:      Right Ear: Decreased hearing noted.      Left Ear: Decreased hearing noted.      Nose: Rhinorrhea present.   Cardiovascular:      Rate and Rhythm: Normal rate and regular rhythm.      Pulses: Normal pulses.      Heart sounds: Normal heart sounds. No murmur heard.  Pulmonary:      Effort: Pulmonary effort is normal. No respiratory distress.      Breath sounds: Normal breath sounds. No stridor.   Musculoskeletal:      Cervical back: Neck supple.      Right lower leg: No edema.      Left lower leg: No edema.   Skin:     General: Skin is warm and dry.   Neurological:      General: No focal deficit present.      Mental Status: She is alert and oriented to person, place, and time.   Psychiatric:         Mood and Affect: Mood normal.         Behavior: Behavior normal.         Thought Content: Thought content normal.          The following data was reviewed by: LOUIS Coles on 02/21/2024:  CMP          8/10/2023    07:56 11/27/2023    10:17 2/13/2024    08:39   CMP   Glucose 114  110  108    BUN 21  19  25    Creatinine 0.92  0.80  0.92    Sodium 145  138  141    Potassium 4.0  3.8  4.0    Chloride 102  102  102    Calcium 9.9  9.4  9.2    Total Protein 6.8  6.5  6.7    Albumin 4.4  4.5  4.3    Globulin 2.4  2.0  2.4    Total Bilirubin 0.9  0.9  0.9    Alkaline Phosphatase 66  70  68    AST (SGOT) 19  22  20    ALT (SGPT) 19  18  18    BUN/Creatinine Ratio 22.8  23.8  27      CBC          8/10/2023    07:56 11/27/2023    10:17 2/13/2024    08:39   CBC   WBC 4.46  4.25  4.7    RBC 4.24  4.21  4.24    Hemoglobin 13.3  13.1  13.1    Hematocrit 39.5  38.0  39.4    MCV 93.2  90.3  93    MCH 31.4  31.1  30.9    MCHC 33.7  34.5  33.2    RDW 12.6  12.8  13.1    Platelets 237  213  225      Lipid Panel          8/10/2023    07:56 11/27/2023    10:17 2/13/2024    08:39   Lipid Panel   Total Cholesterol 160  144  156     Triglycerides 131  120  116    HDL Cholesterol 55  51  55    VLDL Cholesterol 23  21  21    LDL Cholesterol  82  72  80      A1C Last 3 Results          8/10/2023    07:56 11/27/2023    10:17 2/13/2024    08:39   HGBA1C Last 3 Results   Hemoglobin A1C 6.50  6.50  6.5        Data reviewed : Radiologic studies DXA and mammogram, Cardiology studies ECG, Consultant notes Cardio, and GI studies colonoscopy           Assessment and Plan      Diagnosis Plan   1. Encounter for subsequent annual wellness visit (AWV) in Medicare patient        2. Controlled type 2 diabetes mellitus without complication, without long-term current use of insulin  POCT microalbumin    CT Cardiac Calcium Score Without Dye    Vascular Screening (Bundle) CAR    CBC & Differential    Comprehensive Metabolic Panel    Lipid Panel With / Chol / HDL Ratio    Hemoglobin A1c    Hgb A1c at goal 6.5%      3. Primary hypertension  Vascular Screening (Bundle) CAR    Comprehensive Metabolic Panel    Hemoglobin A1c    BP at goal on current meds      4. Mixed hyperlipidemia  CT Cardiac Calcium Score Without Dye    Vascular Screening (Bundle) CAR    CBC & Differential    Comprehensive Metabolic Panel    Lipid Panel With / Chol / HDL Ratio    LDL goal < 70. Switch from Zocor 40mg to Crestor 20mg daily      5. PAF (paroxysmal atrial fibrillation)      HR Regular today      6. Paroxysmal atrial fibrillation  Vascular Screening (Bundle) CAR      7. Encounter for osteoporosis screening in asymptomatic postmenopausal patient  dexa bone density axial            I spent 45 minutes caring for Debbie on this date of service. This time includes time spent by me in the following activities:preparing for the visit, reviewing tests, obtaining and/or reviewing a separately obtained history, performing a medically appropriate examination and/or evaluation , counseling and educating the patient/family/caregiver, ordering medications, tests, or procedures, referring and  communicating with other health care professionals , documenting information in the medical record, and independently interpreting results and communicating that information with the patient/family/caregiver  Follow Up     6 months w labs      Patient was given instructions and counseling regarding her condition or for health maintenance advice. Please see specific information pulled into the AVS if appropriate.

## 2024-03-05 ENCOUNTER — TELEPHONE (OUTPATIENT)
Dept: INTERNAL MEDICINE | Facility: CLINIC | Age: 74
End: 2024-03-05
Payer: MEDICARE

## 2024-03-05 NOTE — TELEPHONE ENCOUNTER
NEMESIO  Terra got a note from urgent care from 3-4-2024. She said that she does not recommend that she take a steroid pack due to risk of atrial fibrillation being triggered. She can treat herself with OTC Zyrtec and flonase for middle ear infusion.

## 2024-03-06 ENCOUNTER — HOSPITAL ENCOUNTER (INPATIENT)
Facility: HOSPITAL | Age: 74
LOS: 3 days | Discharge: HOME | DRG: 206 | End: 2024-03-09
Attending: HOSPITALIST | Admitting: HOSPITALIST
Payer: MEDICARE

## 2024-03-06 ENCOUNTER — APPOINTMENT (OUTPATIENT)
Dept: CT IMAGING | Facility: HOSPITAL | Age: 74
DRG: 206 | End: 2024-03-06
Payer: MEDICARE

## 2024-03-06 ENCOUNTER — NURSE TRIAGE (OUTPATIENT)
Dept: CALL CENTER | Facility: HOSPITAL | Age: 74
End: 2024-03-06
Payer: MEDICARE

## 2024-03-06 ENCOUNTER — APPOINTMENT (OUTPATIENT)
Dept: GENERAL RADIOLOGY | Facility: HOSPITAL | Age: 74
DRG: 206 | End: 2024-03-06
Payer: MEDICARE

## 2024-03-06 ENCOUNTER — HOSPITAL ENCOUNTER (EMERGENCY)
Facility: HOSPITAL | Age: 74
Discharge: ANOTHER HOSPITAL | DRG: 206 | End: 2024-03-06
Attending: EMERGENCY MEDICINE | Admitting: EMERGENCY MEDICINE
Payer: MEDICARE

## 2024-03-06 VITALS
TEMPERATURE: 99.1 F | RESPIRATION RATE: 15 BRPM | BODY MASS INDEX: 28.7 KG/M2 | WEIGHT: 152 LBS | DIASTOLIC BLOOD PRESSURE: 65 MMHG | SYSTOLIC BLOOD PRESSURE: 123 MMHG | OXYGEN SATURATION: 92 % | HEART RATE: 85 BPM | HEIGHT: 61 IN

## 2024-03-06 DIAGNOSIS — R91.8 PULMONARY MASS: Primary | ICD-10-CM

## 2024-03-06 DIAGNOSIS — R91.8 LUNG MASS: Primary | ICD-10-CM

## 2024-03-06 PROBLEM — R10.13 EPIGASTRIC PAIN: Status: ACTIVE | Noted: 2024-03-06

## 2024-03-06 LAB
ALBUMIN SERPL-MCNC: 4 G/DL (ref 3.5–5.2)
ALBUMIN/GLOB SERPL: 1.4 G/DL
ALP SERPL-CCNC: 71 U/L (ref 39–117)
ALT SERPL W P-5'-P-CCNC: 15 U/L (ref 1–33)
ANION GAP SERPL CALCULATED.3IONS-SCNC: 14.1 MMOL/L (ref 5–15)
AST SERPL-CCNC: 14 U/L (ref 1–32)
BASOPHILS # BLD AUTO: 0.04 10*3/MM3 (ref 0–0.2)
BASOPHILS NFR BLD AUTO: 0.3 % (ref 0–1.5)
BILIRUB SERPL-MCNC: 0.5 MG/DL (ref 0–1.2)
BILIRUB UR QL STRIP: NEGATIVE
BUN SERPL-MCNC: 32 MG/DL (ref 8–23)
BUN/CREAT SERPL: 32.3 (ref 7–25)
CALCIUM SPEC-SCNC: 9.1 MG/DL (ref 8.6–10.5)
CHLORIDE SERPL-SCNC: 101 MMOL/L (ref 98–107)
CLARITY UR: CLEAR
CO2 SERPL-SCNC: 22.9 MMOL/L (ref 22–29)
COLOR UR: YELLOW
CREAT SERPL-MCNC: 0.99 MG/DL (ref 0.57–1)
DEPRECATED RDW RBC AUTO: 46.5 FL (ref 37–54)
EGFRCR SERPLBLD CKD-EPI 2021: 60 ML/MIN/1.73
EOSINOPHIL # BLD AUTO: 0.05 10*3/MM3 (ref 0–0.4)
EOSINOPHIL NFR BLD AUTO: 0.3 % (ref 0.3–6.2)
ERYTHROCYTE [DISTWIDTH] IN BLOOD BY AUTOMATED COUNT: 13.6 % (ref 12.3–15.4)
FLUAV SUBTYP SPEC NAA+PROBE: NOT DETECTED
FLUBV RNA ISLT QL NAA+PROBE: NOT DETECTED
GEN 5 2HR TROPONIN T REFLEX: 14 NG/L
GLOBULIN UR ELPH-MCNC: 2.8 GM/DL
GLUCOSE SERPL-MCNC: 113 MG/DL (ref 65–99)
GLUCOSE UR STRIP-MCNC: NEGATIVE MG/DL
HCT VFR BLD AUTO: 39.5 % (ref 34–46.6)
HGB BLD-MCNC: 13.2 G/DL (ref 12–15.9)
HGB UR QL STRIP.AUTO: NEGATIVE
IMM GRANULOCYTES # BLD AUTO: 0.07 10*3/MM3 (ref 0–0.05)
IMM GRANULOCYTES NFR BLD AUTO: 0.5 % (ref 0–0.5)
KETONES UR QL STRIP: NEGATIVE
LEUKOCYTE ESTERASE UR QL STRIP.AUTO: NEGATIVE
LIPASE SERPL-CCNC: 26 U/L (ref 13–60)
LYMPHOCYTES # BLD AUTO: 2.47 10*3/MM3 (ref 0.7–3.1)
LYMPHOCYTES NFR BLD AUTO: 17 % (ref 19.6–45.3)
MCH RBC QN AUTO: 31.4 PG (ref 26.6–33)
MCHC RBC AUTO-ENTMCNC: 33.4 G/DL (ref 31.5–35.7)
MCV RBC AUTO: 93.8 FL (ref 79–97)
MONOCYTES # BLD AUTO: 1.89 10*3/MM3 (ref 0.1–0.9)
MONOCYTES NFR BLD AUTO: 13 % (ref 5–12)
NEUTROPHILS NFR BLD AUTO: 10 10*3/MM3 (ref 1.7–7)
NEUTROPHILS NFR BLD AUTO: 68.9 % (ref 42.7–76)
NITRITE UR QL STRIP: NEGATIVE
NRBC BLD AUTO-RTO: 0 /100 WBC (ref 0–0.2)
PH UR STRIP.AUTO: 5.5 [PH] (ref 4.5–8)
PLATELET # BLD AUTO: 258 10*3/MM3 (ref 140–450)
PMV BLD AUTO: 10.2 FL (ref 6–12)
POTASSIUM SERPL-SCNC: 3.8 MMOL/L (ref 3.5–5.2)
PROT SERPL-MCNC: 6.8 G/DL (ref 6–8.5)
PROT UR QL STRIP: NEGATIVE
QT INTERVAL: 384 MS
QTC INTERVAL: 471 MS
RBC # BLD AUTO: 4.21 10*6/MM3 (ref 3.77–5.28)
SARS-COV-2 RNA RESP QL NAA+PROBE: NOT DETECTED
SODIUM SERPL-SCNC: 138 MMOL/L (ref 136–145)
SP GR UR STRIP: 1.01 (ref 1–1.03)
TROPONIN T DELTA: 0 NG/L
TROPONIN T SERPL HS-MCNC: 14 NG/L
UROBILINOGEN UR QL STRIP: NORMAL
WBC NRBC COR # BLD AUTO: 14.52 10*3/MM3 (ref 3.4–10.8)

## 2024-03-06 PROCEDURE — 83690 ASSAY OF LIPASE: CPT

## 2024-03-06 PROCEDURE — 80053 COMPREHEN METABOLIC PANEL: CPT

## 2024-03-06 PROCEDURE — 25510000001 IOPAMIDOL PER 1 ML: Performed by: EMERGENCY MEDICINE

## 2024-03-06 PROCEDURE — 84484 ASSAY OF TROPONIN QUANT: CPT

## 2024-03-06 PROCEDURE — 85025 COMPLETE CBC W/AUTO DIFF WBC: CPT

## 2024-03-06 PROCEDURE — 99285 EMERGENCY DEPT VISIT HI MDM: CPT

## 2024-03-06 PROCEDURE — 87636 SARSCOV2 & INF A&B AMP PRB: CPT

## 2024-03-06 PROCEDURE — 84145 PROCALCITONIN (PCT): CPT | Performed by: INTERNAL MEDICINE

## 2024-03-06 PROCEDURE — 71275 CT ANGIOGRAPHY CHEST: CPT

## 2024-03-06 PROCEDURE — 36415 COLL VENOUS BLD VENIPUNCTURE: CPT

## 2024-03-06 PROCEDURE — 93005 ELECTROCARDIOGRAM TRACING: CPT

## 2024-03-06 PROCEDURE — 93010 ELECTROCARDIOGRAM REPORT: CPT | Performed by: INTERNAL MEDICINE

## 2024-03-06 PROCEDURE — 74177 CT ABD & PELVIS W/CONTRAST: CPT

## 2024-03-06 PROCEDURE — 71045 X-RAY EXAM CHEST 1 VIEW: CPT

## 2024-03-06 PROCEDURE — 81003 URINALYSIS AUTO W/O SCOPE: CPT

## 2024-03-06 RX ORDER — SODIUM CHLORIDE 0.9 % (FLUSH) 0.9 %
10 SYRINGE (ML) INJECTION EVERY 12 HOURS SCHEDULED
Status: DISCONTINUED | OUTPATIENT
Start: 2024-03-07 | End: 2024-03-09 | Stop reason: HOSPADM

## 2024-03-06 RX ORDER — BISACODYL 5 MG/1
5 TABLET, DELAYED RELEASE ORAL DAILY PRN
Status: DISCONTINUED | OUTPATIENT
Start: 2024-03-06 | End: 2024-03-09 | Stop reason: HOSPADM

## 2024-03-06 RX ORDER — SIMVASTATIN 40 MG
40 TABLET ORAL NIGHTLY
COMMUNITY
End: 2024-03-09 | Stop reason: HOSPADM

## 2024-03-06 RX ORDER — CALCIUM CARBONATE 500 MG/1
2 TABLET, CHEWABLE ORAL 2 TIMES DAILY PRN
Status: DISCONTINUED | OUTPATIENT
Start: 2024-03-06 | End: 2024-03-09 | Stop reason: HOSPADM

## 2024-03-06 RX ORDER — AMOXICILLIN 250 MG
2 CAPSULE ORAL 2 TIMES DAILY PRN
Status: DISCONTINUED | OUTPATIENT
Start: 2024-03-06 | End: 2024-03-09 | Stop reason: HOSPADM

## 2024-03-06 RX ORDER — BISACODYL 10 MG
10 SUPPOSITORY, RECTAL RECTAL DAILY PRN
Status: DISCONTINUED | OUTPATIENT
Start: 2024-03-06 | End: 2024-03-09 | Stop reason: HOSPADM

## 2024-03-06 RX ORDER — ACETAMINOPHEN 160 MG/5ML
650 SOLUTION ORAL EVERY 4 HOURS PRN
Status: DISCONTINUED | OUTPATIENT
Start: 2024-03-06 | End: 2024-03-09 | Stop reason: HOSPADM

## 2024-03-06 RX ORDER — SODIUM CHLORIDE 0.9 % (FLUSH) 0.9 %
10 SYRINGE (ML) INJECTION AS NEEDED
Status: DISCONTINUED | OUTPATIENT
Start: 2024-03-06 | End: 2024-03-06 | Stop reason: HOSPADM

## 2024-03-06 RX ORDER — SODIUM CHLORIDE 0.9 % (FLUSH) 0.9 %
10 SYRINGE (ML) INJECTION AS NEEDED
Status: DISCONTINUED | OUTPATIENT
Start: 2024-03-06 | End: 2024-03-09 | Stop reason: HOSPADM

## 2024-03-06 RX ORDER — NITROGLYCERIN 0.4 MG/1
0.4 TABLET SUBLINGUAL
Status: DISCONTINUED | OUTPATIENT
Start: 2024-03-06 | End: 2024-03-09 | Stop reason: HOSPADM

## 2024-03-06 RX ORDER — ONDANSETRON 2 MG/ML
4 INJECTION INTRAMUSCULAR; INTRAVENOUS EVERY 6 HOURS PRN
Status: DISCONTINUED | OUTPATIENT
Start: 2024-03-06 | End: 2024-03-09 | Stop reason: HOSPADM

## 2024-03-06 RX ORDER — ONDANSETRON 4 MG/1
4 TABLET, ORALLY DISINTEGRATING ORAL EVERY 6 HOURS PRN
Status: DISCONTINUED | OUTPATIENT
Start: 2024-03-06 | End: 2024-03-09 | Stop reason: HOSPADM

## 2024-03-06 RX ORDER — ACETAMINOPHEN 325 MG/1
650 TABLET ORAL EVERY 4 HOURS PRN
Status: DISCONTINUED | OUTPATIENT
Start: 2024-03-06 | End: 2024-03-09 | Stop reason: HOSPADM

## 2024-03-06 RX ORDER — SODIUM CHLORIDE 9 MG/ML
40 INJECTION, SOLUTION INTRAVENOUS AS NEEDED
Status: DISCONTINUED | OUTPATIENT
Start: 2024-03-06 | End: 2024-03-09 | Stop reason: HOSPADM

## 2024-03-06 RX ORDER — POLYETHYLENE GLYCOL 3350 17 G/17G
17 POWDER, FOR SOLUTION ORAL DAILY PRN
Status: DISCONTINUED | OUTPATIENT
Start: 2024-03-06 | End: 2024-03-09 | Stop reason: HOSPADM

## 2024-03-06 RX ORDER — ACETAMINOPHEN 650 MG/1
650 SUPPOSITORY RECTAL EVERY 4 HOURS PRN
Status: DISCONTINUED | OUTPATIENT
Start: 2024-03-06 | End: 2024-03-09 | Stop reason: HOSPADM

## 2024-03-06 RX ORDER — ASPIRIN 325 MG
325 TABLET ORAL ONCE
Status: DISCONTINUED | OUTPATIENT
Start: 2024-03-06 | End: 2024-03-06

## 2024-03-06 RX ADMIN — IOPAMIDOL 100 ML: 755 INJECTION, SOLUTION INTRAVENOUS at 17:56

## 2024-03-06 NOTE — TELEPHONE ENCOUNTER
"Relay     \"LVM for pt. Pt can use OTC zyrtec and flonase nasal spray to aid in symptom relief\"                  "

## 2024-03-06 NOTE — ED PROVIDER NOTES
Subjective   History of Present Illness  73-year-old female presents emergency room complaining of sudden onset of epigastric abdominal pain.  Patient states she was driving home from the pharmacy where she had been to  cough medicine for her upper respiratory infection when she had a sudden onset of abdominal pain in the epigastric area radiating into her back.  She states it is worse when she takes breaths and then has been constant since it occurred proxy 1 to 2 hours ago.  Patient denies headache visual changes fever neck pain jaw pain sore throat diaphoresis shortness of breath vomiting diarrhea or dysuria.  Patient has a history of diabetes but is on no medications at present secondary to diet control.  Patient states she has had a upper respiratory infection for the last several days and just saw her primary care physician for such today but reports no similar abdominal pain complaints previously.      Review of Systems   Constitutional:  Negative for activity change, appetite change, chills, diaphoresis, fatigue and fever.   HENT:  Negative for congestion, rhinorrhea and sore throat.    Eyes:  Negative for photophobia and visual disturbance.   Respiratory:  Negative for cough and shortness of breath.    Cardiovascular:  Negative for chest pain, palpitations and leg swelling.   Gastrointestinal:  Positive for abdominal pain. Negative for abdominal distention, diarrhea, nausea and vomiting.   Genitourinary:  Negative for dysuria and flank pain.   Musculoskeletal:  Positive for back pain. Negative for arthralgias.   Skin:  Negative for rash.   Neurological:  Negative for dizziness, weakness and headaches.   Psychiatric/Behavioral:  Negative for agitation and behavioral problems.        Past Medical History:   Diagnosis Date    Acute bacterial conjunctivitis of left eye 10/20/2016    Allergic rhinitis     Arrhythmia     Asthma     Bronchitis     Cancer     Basel Cell and Squamous cell    Colon polyp      Coronary artery disease Afib    GERD (gastroesophageal reflux disease)     Heart murmur     HL (hearing loss)     Hyperlipidemia     Hypertension     PAF (paroxysmal atrial fibrillation)     one episode, 2015; was briefly treated with Xarelto    Pneumonia     Premature atrial complexes 2022    PVCs (premature ventricular contractions) 2022    Rotator cuff tendonitis     Type 2 diabetes mellitus     UTI (urinary tract infection)     Vitamin D deficiency        Allergies   Allergen Reactions    Penicillins Anaphylaxis and Swelling    Erythromycin Other (See Comments)     JOINT PAIN AND SWELLING    Astelin [Azelastine] Cough       Past Surgical History:   Procedure Laterality Date    CARDIAC CATHETERIZATION      Cardiologist takes care of     SECTION      CHOLECYSTECTOMY      COLONOSCOPY      EYE SURGERY Bilateral     cataract surgery    GUM SURGERY  2017    HYSTERECTOMY      KNEE SURGERY         Family History   Problem Relation Age of Onset    Stroke Mother     Hypertension Mother     Heart disease Father     Heart attack Father     Alcohol abuse Father         Ptsd    Hypertension Sister     Colon polyps Sister     Heart disease Brother     Cancer Paternal Grandmother             Breast cancer Paternal Grandmother     Heart disease Paternal Grandfather     Diabetes Paternal Grandfather     Kidney disease Sister         Jacks2       Social History     Socioeconomic History    Marital status:    Tobacco Use    Smoking status: Never     Passive exposure: Never    Smokeless tobacco: Never    Tobacco comments:     Father was a [employer] smoker   Vaping Use    Vaping status: Never Used   Substance and Sexual Activity    Alcohol use: No    Drug use: No    Sexual activity: Yes     Partners: Male           Objective   Physical Exam  Vitals and nursing note reviewed.   Constitutional:       General: She is not in acute distress.     Appearance: Normal appearance. She is not  ill-appearing, toxic-appearing or diaphoretic.      Comments: Pleasant 73-year-old female in no acute distress   HENT:      Head: Normocephalic and atraumatic.      Nose: Nose normal.      Mouth/Throat:      Mouth: Mucous membranes are moist.   Eyes:      Conjunctiva/sclera: Conjunctivae normal.   Cardiovascular:      Rate and Rhythm: Normal rate and regular rhythm.      Pulses: Normal pulses.   Pulmonary:      Effort: Pulmonary effort is normal.      Breath sounds: Normal breath sounds.      Comments: Patient has pain with inspiration and expiration in the area she describes.  She states this reproduces the pain she is complaining of  Abdominal:      General: Abdomen is flat. There is no distension.      Tenderness: There is abdominal tenderness.      Comments: Patient has some mild epigastric tenderness to palpation with no pain in her back.   Musculoskeletal:         General: No swelling. Normal range of motion.      Cervical back: Normal range of motion and neck supple.   Skin:     General: Skin is warm and dry.   Neurological:      General: No focal deficit present.      Mental Status: She is alert and oriented to person, place, and time.   Psychiatric:         Mood and Affect: Mood normal.         Procedures           ED Course  ED Course as of 03/06/24 2002   Wed Mar 06, 2024   1841 COVID19: Not Detected [BH]   1841 Influenza A PCR: Not Detected []   1841 Influenza B PCR: Not Detected []   1841 Glucose(!): 113 [BH]   1841 BUN(!): 32 [BH]   1841 Creatinine: 0.99 [BH]   1841 Sodium: 138 [BH]   1841 Potassium: 3.8 [BH]   1841 Chloride: 101 []   1841 Lipase: 26 [BH]   1841 WBC(!): 14.52 [BH]   1841 Hemoglobin: 13.2 [BH]   1841 Hematocrit: 39.5 [BH]   1841 Platelets: 258 [BH]   1843 CT angiogram chest:  IMPRESSION:     1. There appears to be a right hilar mass with surrounding atelectasis. Right-sided pulmonary nodules are noted. Differential considerations for this finding include an area of atelectasis  within the right middle lobe and juxtahilar region versus   atypical infectious process though these are considered less likely. Transbronchial biopsy recommended. No definite evidence of distant metastatic disease.     2. No pulmonary embolus.     3. No acute abdominal or pelvic pathology.        Electronically Signed: Juan Bajwa MD    3/6/2024 6:27 PM EST    Workstation ID: [id number]   []   1959 Spoke with Dr. Stephen pulmonology who after hearing patient's history physical lab and radiologic findings and he also reviewed the CT scan himself submitted the patient should probably be transferred to [facility] where they can consult and achieve bronchoscopy with biopsy.  I spoke with the patient concerning her findings and the need to follow-up with her outpatient Marisol or impatiently and patient preferred inpatient route. []   2000 I spoke with Tamika who is on-call for Dr. Dejesus who is a hospitalist at [facility] who accepted transfer to their service. []   2000 I have not started patient on antibiotics at this time as there is no clear etiology for infection although patient's white blood cell count is mildly elevated. []      ED Course User Index  [] Steve Dean MD                                             Medical Decision Making  My differential diagnosis for abdominal pain includes but is not limited to:  Gastritis, gastroenteritis, peptic ulcer disease, GERD, esophageal perforation, acute appendicitis, mesenteric adenitis, Meckel's diverticulum, epiploic appendagitis, diverticulitis, colon cancer, ulcerative colitis, Crohn's disease, intussusception, small bowel obstruction, adhesions, ischemic bowel, perforated viscus, ileus, obstipation, biliary colic, cholecystitis, cholelithiasis, Juventino-Isaias Bryon, hepatitis, pancreatitis, common bile duct obstruction, cholangitis, bile leak, splenic trauma, splenic rupture, splenic infarction, splenic abscess, abdominal wall hematoma,  abdominal wall abscess, intra-abdominal abscess, ascites, spontaneous bacterial peritonitis, hernia, UTI, cystitis, prostatitis, ureterolithiasis, urinary obstruction, AAA, myocardial infarction, pneumonia, cancer, porphyria, DKA, medications, sickle cell, viral syndrome, zoster     Amount and/or Complexity of Data Reviewed  Radiology: ordered. Decision-making details documented in ED Course.        Final diagnoses:   Pulmonary mass       ED Disposition  ED Disposition       ED Disposition   Transfer to Another Facility     Condition   --    Comment   --               No follow-up provider specified.       Medication List      No changes were made to your prescriptions during this visit.            Steve Dean MD  03/06/24 2002

## 2024-03-06 NOTE — TELEPHONE ENCOUNTER
Best Callback Number: [phone]    HUB PROVIDED THE RELAY MESSAGE FROM THE OFFICE   PATIENT HAS FURTHER QUESTIONS AND WOULD LIKE A CALL BACK AT THE FOLLOWING PHONE NUMBER [phone]    ADDITIONAL INFORMATION: PATIENT WOULD LIKE TO DISCUSS WHAT OTHER OPTIONS SHE HAS.

## 2024-03-06 NOTE — TELEPHONE ENCOUNTER
She should add flonase with Xyzal/Zyrtec. She can take Robitussin OTC as needed and her inhaler as needed.

## 2024-03-06 NOTE — TELEPHONE ENCOUNTER
Name: Debbie Cheng    Relationship: Self    Best Callback Number: [phone] (HER CELL IS NOT RINGING WHEN THE OFFICE CALLS BACK)    HUB PROVIDED THE RELAY MESSAGE FROM THE OFFICE    PATIENT: HAS FURTHER QUESTIONS AND WOULD LIKE A CALL BACK AT THE FOLLOWING PHONE NUMBER  [phone]     ADDITIONAL INFORMATION: PATIENT ALREADY TAKES XYZAL 5 MG, IS THIS ZYRTEC WITH SOMETHING ADDED TO IT? SHE HAS BEEN TAKING THIS FOR A FEW YEARS.     CAN THE PATIENT TAKE ROBITUSSIN DM FOR WHEN SHE STARTS COUGHING? PLEASE ADVISE.     IS IT OKAY TO USE HER INHALER WHEN SHE STARTS COUGHING?

## 2024-03-06 NOTE — TELEPHONE ENCOUNTER
"Patient is concerned about chest pain, that radiates into her back.   Pain 7/10 under her breast started after after taking her inhaler.   Stopped taking the steroid after 2 days because she was told by her PCP. She did not taper the medication down.   Now she is anxious, she describes her breathing as heavy and rapid.   Triage completed and patient advised to call 911. She said that it will take EMS forever to get to her house and she will just have her  take her to the ED now. I told her that if anything changes in route, that they need to call 911. She said that she will follow this advice.   Reason for Disposition   [1] Chest pain lasts > 5 minutes AND [2] age > 44    Additional Information   Negative: SEVERE difficulty breathing (e.g., struggling for each breath, speaks in single words)   Negative: Difficult to awaken or acting confused (e.g., disoriented, slurred speech)   Negative: Shock suspected (e.g., cold/pale/clammy skin, too weak to stand, low BP, rapid pulse)   Negative: Passed out (i.e., lost consciousness, collapsed and was not responding)    Answer Assessment - Initial Assessment Questions  1. LOCATION: \"Where does it hurt?\"        [place] pain under both breast, more under the left, in the center of her chest   2. RADIATION: \"Does the pain go anywhere else?\" (e.g., into neck, jaw, arms, back)      Back   3. ONSET: \"When did the chest pain begin?\" (Minutes, hours or days)       1 hour ago   4. PATTERN: \"Does the pain come and go, or has it been constant since it started?\"  \"Does it get worse with exertion?\"       Constant, getting worse   5. DURATION: \"How long does it last\" (e.g., seconds, minutes, hours)      1 hours   6. SEVERITY: \"How bad is the pain?\"  (e.g., Scale 1-10; mild, moderate, or severe)     - MILD (1-3): doesn't interfere with normal activities      - MODERATE (4-7): interferes with normal activities or awakens from sleep     - SEVERE (8-10): excruciating pain, unable to do any " "normal activities        Moderate   7. CARDIAC RISK FACTORS: \"Do you have any history of heart problems or risk factors for heart disease?\" (e.g., angina, prior heart attack; diabetes, high blood pressure, high cholesterol, smoker, or strong family history of heart disease)  A-fib, HTN,   8. PULMONARY RISK FACTORS: \"Do you have any history of lung disease?\"  (e.g., blood clots in lung, asthma, emphysema, birth control pills)      Reactive airway disease- inhaler   9. CAUSE: \"What do you think is causing the chest pain?\"      Not sure   10. OTHER SYMPTOMS: \"Do you have any other symptoms?\" (e.g., dizziness, nausea, vomiting, sweating, fever, difficulty breathing, cough)      Anxious, no appetite, rapid and heavy breathing   11. PREGNANCY: \"Is there any chance you are pregnant?\" \"When was your last menstrual period?\"        no    Protocols used: Chest Pain-ADULT-AH    "

## 2024-03-07 ENCOUNTER — HOSPITAL ENCOUNTER (OUTPATIENT)
Dept: CT IMAGING | Facility: HOSPITAL | Age: 74
Discharge: HOME | End: 2024-03-07
Payer: MEDICARE

## 2024-03-07 LAB
ANION GAP SERPL CALCULATED.3IONS-SCNC: 12.6 MMOL/L (ref 5–15)
B PARAPERT DNA SPEC QL NAA+PROBE: NOT DETECTED
B PERT DNA SPEC QL NAA+PROBE: NOT DETECTED
BUN SERPL-MCNC: 22 MG/DL (ref 8–23)
BUN/CREAT SERPL: 27.8 (ref 7–25)
C PNEUM DNA NPH QL NAA+NON-PROBE: NOT DETECTED
CALCIUM SPEC-SCNC: 8.6 MG/DL (ref 8.6–10.5)
CHLORIDE SERPL-SCNC: 101 MMOL/L (ref 98–107)
CO2 SERPL-SCNC: 24.4 MMOL/L (ref 22–29)
CREAT SERPL-MCNC: 0.79 MG/DL (ref 0.57–1)
DEPRECATED RDW RBC AUTO: 40.1 FL (ref 37–54)
EGFRCR SERPLBLD CKD-EPI 2021: 78.6 ML/MIN/1.73
ERYTHROCYTE [DISTWIDTH] IN BLOOD BY AUTOMATED COUNT: 12.5 % (ref 12.3–15.4)
FLUAV SUBTYP SPEC NAA+PROBE: NOT DETECTED
FLUBV RNA ISLT QL NAA+PROBE: NOT DETECTED
GLUCOSE BLDC GLUCOMTR-MCNC: 151 MG/DL (ref 70–130)
GLUCOSE BLDC GLUCOMTR-MCNC: 156 MG/DL (ref 70–130)
GLUCOSE SERPL-MCNC: 155 MG/DL (ref 65–99)
HADV DNA SPEC NAA+PROBE: NOT DETECTED
HCOV 229E RNA SPEC QL NAA+PROBE: NOT DETECTED
HCOV HKU1 RNA SPEC QL NAA+PROBE: NOT DETECTED
HCOV NL63 RNA SPEC QL NAA+PROBE: NOT DETECTED
HCOV OC43 RNA SPEC QL NAA+PROBE: NOT DETECTED
HCT VFR BLD AUTO: 34.5 % (ref 34–46.6)
HGB BLD-MCNC: 11.5 G/DL (ref 12–15.9)
HMPV RNA NPH QL NAA+NON-PROBE: NOT DETECTED
HPIV1 RNA ISLT QL NAA+PROBE: NOT DETECTED
HPIV2 RNA SPEC QL NAA+PROBE: NOT DETECTED
HPIV3 RNA NPH QL NAA+PROBE: NOT DETECTED
HPIV4 P GENE NPH QL NAA+PROBE: NOT DETECTED
M PNEUMO IGG SER IA-ACNC: NOT DETECTED
MCH RBC QN AUTO: 29.9 PG (ref 26.6–33)
MCHC RBC AUTO-ENTMCNC: 33.3 G/DL (ref 31.5–35.7)
MCV RBC AUTO: 89.8 FL (ref 79–97)
PLATELET # BLD AUTO: 234 10*3/MM3 (ref 140–450)
PMV BLD AUTO: 10 FL (ref 6–12)
POTASSIUM SERPL-SCNC: 3.9 MMOL/L (ref 3.5–5.2)
PROCALCITONIN SERPL-MCNC: <0.02 NG/ML (ref 0–0.25)
QT INTERVAL: 379 MS
QTC INTERVAL: 415 MS
RBC # BLD AUTO: 3.84 10*6/MM3 (ref 3.77–5.28)
RHINOVIRUS RNA SPEC NAA+PROBE: NOT DETECTED
RSV RNA NPH QL NAA+NON-PROBE: NOT DETECTED
SARS-COV-2 RNA NPH QL NAA+NON-PROBE: NOT DETECTED
SODIUM SERPL-SCNC: 138 MMOL/L (ref 136–145)
TROPONIN T SERPL HS-MCNC: 20 NG/L
WBC NRBC COR # BLD AUTO: 10.95 10*3/MM3 (ref 3.4–10.8)

## 2024-03-07 PROCEDURE — 82948 REAGENT STRIP/BLOOD GLUCOSE: CPT

## 2024-03-07 PROCEDURE — 0202U NFCT DS 22 TRGT SARS-COV-2: CPT | Performed by: INTERNAL MEDICINE

## 2024-03-07 PROCEDURE — 84484 ASSAY OF TROPONIN QUANT: CPT | Performed by: NURSE PRACTITIONER

## 2024-03-07 PROCEDURE — 80048 BASIC METABOLIC PNL TOTAL CA: CPT | Performed by: NURSE PRACTITIONER

## 2024-03-07 PROCEDURE — 93005 ELECTROCARDIOGRAM TRACING: CPT | Performed by: NURSE PRACTITIONER

## 2024-03-07 PROCEDURE — 85027 COMPLETE CBC AUTOMATED: CPT | Performed by: NURSE PRACTITIONER

## 2024-03-07 PROCEDURE — 93010 ELECTROCARDIOGRAM REPORT: CPT | Performed by: INTERNAL MEDICINE

## 2024-03-07 PROCEDURE — 63710000001 INSULIN LISPRO (HUMAN) PER 5 UNITS: Performed by: HOSPITALIST

## 2024-03-07 RX ORDER — VALSARTAN 320 MG/1
320 TABLET ORAL
Status: DISCONTINUED | OUTPATIENT
Start: 2024-03-07 | End: 2024-03-09 | Stop reason: HOSPADM

## 2024-03-07 RX ORDER — IBUPROFEN 600 MG/1
1 TABLET ORAL
Status: DISCONTINUED | OUTPATIENT
Start: 2024-03-07 | End: 2024-03-09 | Stop reason: HOSPADM

## 2024-03-07 RX ORDER — FLUTICASONE PROPIONATE 50 MCG
2 SPRAY, SUSPENSION (ML) NASAL DAILY
Status: DISCONTINUED | OUTPATIENT
Start: 2024-03-07 | End: 2024-03-09 | Stop reason: HOSPADM

## 2024-03-07 RX ORDER — MONTELUKAST SODIUM 10 MG/1
10 TABLET ORAL NIGHTLY
Status: DISCONTINUED | OUTPATIENT
Start: 2024-03-07 | End: 2024-03-09 | Stop reason: HOSPADM

## 2024-03-07 RX ORDER — ROSUVASTATIN CALCIUM 20 MG/1
20 TABLET, COATED ORAL DAILY
Status: DISCONTINUED | OUTPATIENT
Start: 2024-03-07 | End: 2024-03-09 | Stop reason: HOSPADM

## 2024-03-07 RX ORDER — DEXTROSE MONOHYDRATE 25 G/50ML
25 INJECTION, SOLUTION INTRAVENOUS
Status: DISCONTINUED | OUTPATIENT
Start: 2024-03-07 | End: 2024-03-09 | Stop reason: HOSPADM

## 2024-03-07 RX ORDER — ALBUTEROL SULFATE 2.5 MG/3ML
2.5 SOLUTION RESPIRATORY (INHALATION) EVERY 6 HOURS PRN
Status: DISCONTINUED | OUTPATIENT
Start: 2024-03-07 | End: 2024-03-09 | Stop reason: HOSPADM

## 2024-03-07 RX ORDER — INSULIN LISPRO 100 [IU]/ML
2-9 INJECTION, SOLUTION INTRAVENOUS; SUBCUTANEOUS
Status: DISCONTINUED | OUTPATIENT
Start: 2024-03-07 | End: 2024-03-09 | Stop reason: HOSPADM

## 2024-03-07 RX ORDER — CETIRIZINE HYDROCHLORIDE 10 MG/1
10 TABLET ORAL DAILY
Status: DISCONTINUED | OUTPATIENT
Start: 2024-03-07 | End: 2024-03-09 | Stop reason: HOSPADM

## 2024-03-07 RX ORDER — NICOTINE POLACRILEX 4 MG
15 LOZENGE BUCCAL
Status: DISCONTINUED | OUTPATIENT
Start: 2024-03-07 | End: 2024-03-09 | Stop reason: HOSPADM

## 2024-03-07 RX ORDER — IPRATROPIUM BROMIDE AND ALBUTEROL SULFATE 2.5; .5 MG/3ML; MG/3ML
3 SOLUTION RESPIRATORY (INHALATION) EVERY 4 HOURS PRN
Status: DISCONTINUED | OUTPATIENT
Start: 2024-03-07 | End: 2024-03-09 | Stop reason: HOSPADM

## 2024-03-07 RX ORDER — GUAIFENESIN 200 MG/10ML
200 LIQUID ORAL EVERY 4 HOURS PRN
Status: DISCONTINUED | OUTPATIENT
Start: 2024-03-07 | End: 2024-03-09 | Stop reason: HOSPADM

## 2024-03-07 RX ORDER — HYDROCHLOROTHIAZIDE 12.5 MG/1
12.5 TABLET ORAL
Status: DISCONTINUED | OUTPATIENT
Start: 2024-03-07 | End: 2024-03-09 | Stop reason: HOSPADM

## 2024-03-07 RX ADMIN — Medication 10 ML: at 20:26

## 2024-03-07 RX ADMIN — VALSARTAN 320 MG: 320 TABLET, FILM COATED ORAL at 09:16

## 2024-03-07 RX ADMIN — Medication 10 ML: at 01:45

## 2024-03-07 RX ADMIN — CETIRIZINE HYDROCHLORIDE 10 MG: 10 TABLET ORAL at 09:16

## 2024-03-07 RX ADMIN — FLUTICASONE PROPIONATE 2 SPRAY: 50 SPRAY, METERED NASAL at 09:16

## 2024-03-07 RX ADMIN — METOPROLOL TARTRATE 25 MG: 25 TABLET, FILM COATED ORAL at 20:26

## 2024-03-07 RX ADMIN — INSULIN LISPRO 2 UNITS: 100 INJECTION, SOLUTION INTRAVENOUS; SUBCUTANEOUS at 21:10

## 2024-03-07 RX ADMIN — MONTELUKAST SODIUM 10 MG: 10 TABLET, FILM COATED ORAL at 20:26

## 2024-03-07 RX ADMIN — INSULIN LISPRO 2 UNITS: 100 INJECTION, SOLUTION INTRAVENOUS; SUBCUTANEOUS at 18:06

## 2024-03-07 RX ADMIN — METOPROLOL TARTRATE 25 MG: 25 TABLET, FILM COATED ORAL at 09:16

## 2024-03-07 RX ADMIN — HYDROCHLOROTHIAZIDE 12.5 MG: 12.5 TABLET ORAL at 09:16

## 2024-03-07 RX ADMIN — METOPROLOL TARTRATE 25 MG: 25 TABLET, FILM COATED ORAL at 01:45

## 2024-03-07 RX ADMIN — Medication 10 ML: at 09:18

## 2024-03-07 NOTE — PROGRESS NOTES
Nutrition Services    Patient Name:  Debbie Cheng  YOB: 1950  MRN: [id number]  Admit Date:  3/6/2024    Assessment Date:  03/07/24    NUTRITION SCREENING      Reason for Encounter MST score 2+   Diagnosis/Problem 72 y/o female who presented c/o chest pain. Pt has a hx of cancer, HTN, HLD, T2DM, a fib, GERD, CAD.    Pt to undergo bronchoscopy procedure.        PO Diet Diet: Regular/House; Fluid Consistency: Thin (IDDSI 0)  NPO Diet NPO Type: Strict NPO   Supplements    PO Intake % Pt reported usual intake of % of 3 meals per day. Pt reported decreased appetite x 2-3 days but feels like it is getting better today. Pt ate 100% of breakfast this morning        Medications MAR reviewed by RD   Labs  Listed below, reviewed   Physical Findings Awake, alert, oriented,    GI Function WDL, Last BM 3/6   Skin Status WDL       Height  Weight  BMI  Weight Trend        Weight: 69.7 kg (153 lb 11.2 oz) (03/06/24 2240)  Body mass index is 29.04 kg/m².  Stable       Nutrition Problem (PES) No nutrition diagnosis at this time.       Intervention/Plan Pt requested low NA diet. RD to accomodate this request and continue following.     RD to follow up per protocol.     Results from last 7 days   Lab Units 03/07/24  0305 03/06/24  1713   SODIUM mmol/L 138 138   POTASSIUM mmol/L 3.9 3.8   CHLORIDE mmol/L 101 101   CO2 mmol/L 24.4 22.9   BUN mg/dL 22 32*   CREATININE mg/dL 0.79 0.99   CALCIUM mg/dL 8.6 9.1   BILIRUBIN mg/dL  --  0.5   ALK PHOS U/L  --  71   ALT (SGPT) U/L  --  15   AST (SGOT) U/L  --  14   GLUCOSE mg/dL 155* 113*     Results from last 7 days   Lab Units 03/07/24  0305   HEMOGLOBIN g/dL 11.5*   HEMATOCRIT % 34.5     Lab Results   Component Value Date    HGBA1C 6.5 (H) 02/13/2024         Electronically signed by:  Bhanu Roque  03/07/24 09:32 EST

## 2024-03-07 NOTE — ED NOTES
Call placed to [facility] transfer center. Awaiting call back from hospitalist at [facility].

## 2024-03-07 NOTE — H&P
Patient Name:  Debbie Cheng  YOB: 1950  MRN:  [id number]  Admit Date:  3/6/2024  Patient Care Team:  Terra Reddy APRN as PCP - General (Family Medicine)  Trace Onofre MD as Consulting Physician (Ophthalmology)  Keon Mann MD as Consulting Physician (Cardiology)  Parrish Oliver MD as Consulting Physician (Orthopedic Surgery)  Emily Aranda MD as Consulting Physician (Dermatology)      Subjective   History Present Illness     Chief Complaint: Chest Pain    Ms. Cheng is a 73 y.o. non-smoker with a history of paroxysmal atrial fibrillation, hypertension, hyperlipidemia, type 2 diabetes mellitus, coronary artery disease, and GERD that presents to [facility] complaining of chest pain.  She reports a sudden onset of substernal chest pain yesterday afternoon.  She describes the pain as epigastric, constant, and sharp in nature.  She states the pain radiated through to her mid-back.  She denies shortness of breath, fever, and palpitations.  She states she has had a cough for several days and was treated with a few days of steroids before her PCP discontinued the medication.  She also reports chills but is unsure if she had a fever.  She initially presented to the ED at [facility] where a CT angiogram of the chest showed a right hilar mass with surrounding atelectasis and right-sided pulmonary nodules.  There was no pulmonary embolism present.  Lab work showed WBC 14.52, troponin 14, and repeat 14.  She was transferred to [facility] for further evaluation.      History of Present Illness  Review of Systems   Constitutional:  Positive for chills. Negative for fever.   HENT:  Negative for congestion and sore throat.    Eyes:  Negative for photophobia and visual disturbance.   Respiratory:  Positive for cough. Negative for chest tightness, shortness of breath and wheezing.    Cardiovascular:  Positive for chest pain. Negative for  palpitations and leg swelling.   Gastrointestinal:  Negative for abdominal pain, nausea and vomiting.   Genitourinary:  Negative for decreased urine volume, dysuria, flank pain, frequency, hematuria and urgency.   Musculoskeletal:  Positive for back pain and myalgias.   Neurological:  Negative for dizziness, weakness, light-headedness, numbness and headaches.        Personal History     Past Medical History:   Diagnosis Date    Acute bacterial conjunctivitis of left eye 10/20/2016    Allergic rhinitis     Arrhythmia     Asthma     Bronchitis     Cancer     Basel Cell and Squamous cell    Colon polyp     Coronary artery disease Afib    GERD (gastroesophageal reflux disease)     Heart murmur     HL (hearing loss)     Hyperlipidemia     Hypertension     PAF (paroxysmal atrial fibrillation)     one episode, 2015; was briefly treated with Xarelto    Pneumonia     Premature atrial complexes 2022    PVCs (premature ventricular contractions) 2022    Rotator cuff tendonitis     Type 2 diabetes mellitus     UTI (urinary tract infection)     Vitamin D deficiency      Past Surgical History:   Procedure Laterality Date    CARDIAC CATHETERIZATION      Cardiologist takes care of     SECTION      CHOLECYSTECTOMY      COLONOSCOPY      EYE SURGERY Bilateral     cataract surgery    GUM SURGERY  2017    HYSTERECTOMY      KNEE SURGERY       Family History   Problem Relation Age of Onset    Stroke Mother     Hypertension Mother     Heart disease Father     Heart attack Father     Alcohol abuse Father         Ptsd    Hypertension Sister     Colon polyps Sister     Heart disease Brother     Cancer Paternal Grandmother             Breast cancer Paternal Grandmother     Heart disease Paternal Grandfather     Diabetes Paternal Grandfather     Kidney disease Sister         Jacks2     Social History     Tobacco Use    Smoking status: Never     Passive exposure: Never    Smokeless tobacco: Never    Tobacco  comments:     Father was a josé manuel smoker   Vaping Use    Vaping status: Never Used   Substance Use Topics    Alcohol use: No    Drug use: No     Medications Prior to Admission   Medication Sig Dispense Refill Last Dose    fluticasone (FLONASE) 50 MCG/ACT nasal spray 2 sprays into the nostril(s) as directed by provider Daily. Administer 2 sprays in each nostril for each dose.   3/6/2024    glucose blood (OneTouch Ultra) test strip USE TO CHECK BLOOD SUGAR DAILY. USE AS INSTRUCTED.(E11.65,KS) 100 each 3     glucose monitor monitoring kit Use glucose meter to check fasting glucose once daily for diabetes monitoring. E11.9 1 each 0     levocetirizine (XYZAL) 5 MG tablet Take 1 tablet by mouth Every Morning.   3/6/2024    magnesium oxide (MAGOX) 400 (241.3 Mg) MG tablet tablet Take 1 tablet by mouth Daily.   3/6/2024    metoprolol tartrate (LOPRESSOR) 25 MG tablet TAKE 1 TABLET BY MOUTH TWICE A  tablet 1 3/6/2024    montelukast (SINGULAIR) 10 MG tablet TAKE 1 TABLET BY MOUTH EVERY DAY AT NIGHT 90 tablet 3     multivitamin with minerals tablet tablet Take 1 tablet by mouth Daily.   3/5/2024    OneTouch Delica Lancets 33G misc USE ONE LANCET DAILY TO CHECK FASTING GLUCOSE FOR DIABETES MONITORING. E11.9 100 each 12     valsartan-hydrochlorothiazide (DIOVAN-HCT) 320-12.5 MG per tablet TAKE 1 TABLET BY MOUTH EVERY DAY 90 tablet 3 3/6/2024    VENTOLIN  (90 Base) MCG/ACT inhaler USE 2 PUFFS EVERY 4-6 HOURS AS NEEDED FOR COUGHING ,WHEEZING OR SHORTNESS OF BREATH.  3 3/6/2024    vitamin D (ERGOCALCIFEROL) 1.25 MG (53200 UT) capsule capsule TAKE 1 CAPSULE BY MOUTH ONE TIME PER WEEK 12 capsule 3 Past Week    Xarelto 20 MG tablet TAKE 1 TABLET BY MOUTH EVERY DAY 90 tablet 3 3/5/2024    rosuvastatin (Crestor) 20 MG tablet Take 1 tablet by mouth Daily. 90 tablet 1     simvastatin (ZOCOR) 40 MG tablet Take 1 tablet by mouth Every Night.        Allergies:    Allergies   Allergen Reactions    Penicillins Anaphylaxis and  Swelling    Erythromycin Other (See Comments)     JOINT PAIN AND SWELLING    Astelin [Azelastine] Cough       Objective    Objective     Vital Signs  Temp:  [97.7 °F (36.5 °C)-99.6 °F (37.6 °C)] 99.6 °F (37.6 °C)  Heart Rate:  [83-92] 83  Resp:  [15-16] 16  BP: (123-167)/(64-84) 129/64  SpO2:  [91 %-98 %] 93 %  on   ;   Device (Oxygen Therapy): room air  Body mass index is 29.04 kg/m².    Physical Exam  Vitals and nursing note reviewed.   Constitutional:       Appearance: Normal appearance.   HENT:      Head: Normocephalic and atraumatic.      Nose: Nose normal.   Eyes:      Extraocular Movements: Extraocular movements intact.      Conjunctiva/sclera: Conjunctivae normal.   Cardiovascular:      Rate and Rhythm: Normal rate and regular rhythm.      Pulses: Normal pulses.      Heart sounds: Normal heart sounds.   Pulmonary:      Effort: Pulmonary effort is normal.      Breath sounds: Normal breath sounds. Examination of the right-lower field reveals decreased breath sounds. Examination of the left-lower field reveals decreased breath sounds.   Abdominal:      General: Bowel sounds are normal.      Palpations: Abdomen is soft.   Musculoskeletal:         General: No swelling. Normal range of motion.      Cervical back: Normal range of motion and neck supple.   Skin:     General: Skin is warm and dry.   Neurological:      General: No focal deficit present.      Mental Status: She is alert and oriented to person, place, and time.   Psychiatric:         Mood and Affect: Mood normal.         Behavior: Behavior normal.         Results Review:  I reviewed the patient's new clinical results.  I reviewed the patient's new imaging results and agree with the interpretation.  I reviewed the patient's other test results and agree with the interpretation  I personally viewed and interpreted the patient's EKG/Telemetry data  Discussed with ED provider.    Lab Results (last 24 hours)       Procedure Component Value Units Date/Time     CBC & Differential [[id number]]  (Abnormal) Collected: 03/06/24 1713    Specimen: Blood Updated: 03/06/24 1724    Narrative:      The following orders were created for panel order CBC & Differential.  Procedure                               Abnormality         Status                     ---------                               -----------         ------                     CBC Auto Differential[[id number]]        Abnormal            Final result                 Please view results for these tests on the individual orders.    Comprehensive Metabolic Panel [[id number]]  (Abnormal) Collected: 03/06/24 1713    Specimen: Blood Updated: 03/06/24 1738     Glucose 113 mg/dL      BUN 32 mg/dL      Creatinine 0.99 mg/dL      Sodium 138 mmol/L      Potassium 3.8 mmol/L      Chloride 101 mmol/L      CO2 22.9 mmol/L      Calcium 9.1 mg/dL      Total Protein 6.8 g/dL      Albumin 4.0 g/dL      ALT (SGPT) 15 U/L      AST (SGOT) 14 U/L      Alkaline Phosphatase 71 U/L      Total Bilirubin 0.5 mg/dL      Globulin 2.8 gm/dL      A/G Ratio 1.4 g/dL      BUN/Creatinine Ratio 32.3     Anion Gap 14.1 mmol/L      eGFR 60.0 mL/min/1.73     Narrative:      GFR Normal >60  Chronic Kidney Disease <60  Kidney Failure <15    The GFR formula is only valid for adults with stable renal function between ages 18 and 70.    High Sensitivity Troponin T [[id number]]  (Abnormal) Collected: 03/06/24 1713    Specimen: Blood Updated: 03/06/24 1741     HS Troponin T 14 ng/L     Narrative:      High Sensitive Troponin T Reference Range:  <14.0 ng/L- Negative Female for AMI  <22.0 ng/L- Negative Male for AMI  >=14 - Abnormal Female indicating possible myocardial injury.  >=22 - Abnormal Male indicating possible myocardial injury.   Clinicians would have to utilize clinical acumen, EKG, Troponin, and serial changes to determine if it is an Acute Myocardial Infarction or myocardial injury due to an underlying chronic condition.         CBC Auto Differential  [[id number]]  (Abnormal) Collected: 03/06/24 1713    Specimen: Blood Updated: 03/06/24 1724     WBC 14.52 10*3/mm3      RBC 4.21 10*6/mm3      Hemoglobin 13.2 g/dL      Hematocrit 39.5 %      MCV 93.8 fL      MCH 31.4 pg      MCHC 33.4 g/dL      RDW 13.6 %      RDW-SD 46.5 fl      MPV 10.2 fL      Platelets 258 10*3/mm3      Neutrophil % 68.9 %      Lymphocyte % 17.0 %      Monocyte % 13.0 %      Eosinophil % 0.3 %      Basophil % 0.3 %      Immature Grans % 0.5 %      Neutrophils, Absolute 10.00 10*3/mm3      Lymphocytes, Absolute 2.47 10*3/mm3      Monocytes, Absolute 1.89 10*3/mm3      Eosinophils, Absolute 0.05 10*3/mm3      Basophils, Absolute 0.04 10*3/mm3      Immature Grans, Absolute 0.07 10*3/mm3      nRBC 0.0 /100 WBC     Lipase [[id number]]  (Normal) Collected: 03/06/24 1713    Specimen: Blood Updated: 03/06/24 1738     Lipase 26 U/L     COVID PRE-OP / PRE-PROCEDURE SCREENING ORDER (NO ISOLATION) - Swab, Nasopharynx [[id number]]  (Normal) Collected: 03/06/24 1715    Specimen: Swab from Nasopharynx Updated: 03/06/24 1742    Narrative:      The following orders were created for panel order COVID PRE-OP / PRE-PROCEDURE SCREENING ORDER (NO ISOLATION) - Swab, Nasopharynx.  Procedure                               Abnormality         Status                     ---------                               -----------         ------                     COVID-19 and FLU A/B PCR...[[id number]]  Normal              Final result                 Please view results for these tests on the individual orders.    COVID-19 and FLU A/B PCR, 1 HR TAT - Swab, Nasopharynx [[id number]]  (Normal) Collected: 03/06/24 1715    Specimen: Swab from Nasopharynx Updated: 03/06/24 1742     COVID19 Not Detected     Influenza A PCR Not Detected     Influenza B PCR Not Detected    Narrative:      Fact sheet for providers: https://www.fda.gov/media/610892/download    Fact sheet for patients: https://www.fda.gov/media/238585/download    Test performed  by PCR.    Urinalysis With Microscopic If Indicated (No Culture) - Urine, Clean Catch [[id number]]  (Normal) Collected: 03/06/24 1904    Specimen: Urine, Clean Catch Updated: 03/06/24 1930     Color, UA Yellow     Appearance, UA Clear     pH, UA 5.5     Specific Gravity, UA 1.010     Glucose, UA Negative     Ketones, UA Negative     Bilirubin, UA Negative     Blood, UA Negative     Protein, UA Negative     Leuk Esterase, UA Negative     Nitrite, UA Negative     Urobilinogen, UA 0.2 E.U./dL    Narrative:      Urine microscopic not indicated.    High Sensitivity Troponin T 2Hr [[id number]]  (Abnormal) Collected: 03/06/24 1907    Specimen: Blood Updated: 03/06/24 1953     HS Troponin T 14 ng/L      Troponin T Delta 0 ng/L     Narrative:      High Sensitive Troponin T Reference Range:  <14.0 ng/L- Negative Female for AMI  <22.0 ng/L- Negative Male for AMI  >=14 - Abnormal Female indicating possible myocardial injury.  >=22 - Abnormal Male indicating possible myocardial injury.   Clinicians would have to utilize clinical acumen, EKG, Troponin, and serial changes to determine if it is an Acute Myocardial Infarction or myocardial injury due to an underlying chronic condition.                 Imaging Results (Last 24 Hours)       ** No results found for the last 24 hours. **            Results for orders placed during the hospital encounter of 07/22/21    Adult Transthoracic Echo Complete W/ Cont if Necessary Per Protocol    Interpretation Summary  · Calculated left ventricular EF = 56% Estimated left ventricular EF was in agreement with the calculated left ventricular EF. Left ventricular systolic function is normal.  · Normal right ventricular cavity size and systolic function noted.  · The left atrial cavity is mild to moderately dilated.  · Mild to moderate mitral valve regurgitation is present.  · Mild tricuspid valve regurgitation is present.  · Calculated right ventricular systolic pressure from tricuspid regurgitation  is 37 mmHg.  · There is no evidence of pericardial effusion      No orders to display        Assessment/Plan     Active Hospital Problems    Diagnosis  POA    **Lung mass [R91.8]  Unknown    Controlled type 2 diabetes mellitus without complication, without long-term current use of insulin [E11.9]  Yes    Hyperlipidemia [E78.5]  Yes    Hypertension [I10]  Yes    Paroxysmal atrial fibrillation [I48.0]  Yes     one episode, 5/2015; was briefly treated with Xarelto         Lung Mass  -Admit to a telemetry unit for monitoring  -Pulmonology consult  -She is currently sating 93% on room air. Supplemental oxygen as needed to keep sats greater than or equal to 92%  -Continuous pulse oximetry  -IS  -She has some leukocytosis but she has been afebrile. Most likely related to recent steroid use. Check labs in AM  -She states her chest pain has improved. Troponin was elevated but flat. EKG without ischemic changes. Repeat troponin and EKG in Am  -PRN analgesia    Hypertension  -Blood pressures stable. Continue Metoprolol and Valsartan-HCTZ  -Monitor    Paroxysmal Atrial Fibrillation  -Rate is controlled, continue Metoprolol  -Hold Xarelto in anticipation of bronchoscopy    Type 2 Diabetes Mellitus  -Hold oral diabetic medications  -Initiate correctional factor insulin  -Monitor blood sugars ACHS  -Check hgb A1C    Hyperlipidemia/Coronary Artery Disease  -Continue statin    -I discussed the patients findings and my recommendations with patient.    VTE Prophylaxis - SCDs.  Code Status - Full code.       LOUIS Almodovar  [facility]  03/07/24  00:17 EST

## 2024-03-07 NOTE — PROGRESS NOTES
Consult Daily Progress Note  [address]  9/23/2023    Patient Name:  Debbie Cheng  MRN:  [id number]   YOB: 1950  Age: 73 y.o.  Sex: female  LOS: 1    Reason for Consult:  Right hilar mass    Hospital Course:   73-year-old female with a history of asthma, allergic rhinitis, coronary disease, atrial fibrillation who presented with midepigastric pain with CT revealing right hilar mass.    Interval History:  Admitted overnight  Epigastric pain is improved  No shortness of breath or chest pain  No nausea or vomiting  Previously worked as a nurse  Amenable to bronchoscopy    Physical Exam:  Vitals:    03/07/24 0835   BP: 126/60   Pulse: 67   Resp:    Temp:    SpO2:        Intake/Output    Intake/Output Summary (Last 24 hours) at 3/7/2024 0839  Last data filed at 3/7/2024 0700  Gross per 24 hour   Intake 0 ml   Output --   Net 0 ml       General: Alert, nontoxic, NAD  HEENT: NC/AT, EOMI, MMM  Neck: Supple, trachea midline  Cardiac: RRR, no murmur, gallops, rubs  Pulmonary: Clear to auscultation bilaterally, no adventitious breath sounds, normal respiratory effort  GI: Soft, non-tender, non-distended, normal bowel sounds  Extremities: Warm, well perfused, no LE edema  Skin: no visible rash  Neuro: CN II - XII grossly intact  Psychiatry: Normal mood and affect      Data Review:  Results from last 7 days   Lab Units 03/07/24  0305 03/06/24  1713   WBC 10*3/mm3 10.95* 14.52*   HEMOGLOBIN g/dL 11.5* 13.2   PLATELETS 10*3/mm3 234 258     Results from last 7 days   Lab Units 03/07/24  0305 03/06/24  1713   SODIUM mmol/L 138 138   POTASSIUM mmol/L 3.9 3.8   CHLORIDE mmol/L 101 101   CO2 mmol/L 24.4 22.9   BUN mg/dL 22 32*   CREATININE mg/dL 0.79 0.99   GLUCOSE mg/dL 155* 113*   CALCIUM mg/dL 8.6 9.1   Estimated Creatinine Clearance: 55.8 mL/min (by C-G formula based on SCr of 0.79 mg/dL).    Results from last 7 days   Lab Units 03/07/24  0305 03/06/24  1907 03/06/24  1713   AST (SGOT) U/L  --    --  14   ALT (SGPT) U/L  --   --  15   PROCALCITONIN ng/mL  --  <0.02  --    PLATELETS 10*3/mm3 234  --  258               Imaging:  Reviewed chest images personally from past 3 days    ASSESSMENT  /  PLAN:    Right hilar mass  Right upper lobe groundglass pulmonary nodule  Reactive airways disease without acute exacerbation  Cough  Diabetes  Hypertension  Hyperlipidemia  Midepigastric pain  Leukocytosis    -CT chest demonstrates right hilar mass which would be amenable to bronchoscopy and EBUS for diagnosis.  Right upper lobe groundglass nodule is distal in nature and will need to be monitored.  -Plan for bronchoscopy, EBUS/TBNA tomorrow at 2 PM.  -N.p.o. at midnight    Discussed bronchoscopy with EBUS, bronchoalveolar lavage, transbronchial biopsy, endobronchial brushing, airway inspection.  Risks and benefits of the procedure were discussed in detail.  Alternatives and options were reviewed.  Risks including pneumothorax, pneumonia, bleeding, respiratory depression and that it could be fatal were all reviewed.  Patient understands and is agreeable for the procedure.     All issues new to me today.  Prior hospital course, labs and imaging reviewed.    Thank you for allowing us to participate in this patients care. Pulmonary will continue to follow.     Mauro Galloway MD  [facility]  Pulmonary and Critical Care Medicine, Interventional Pulmonology    Parts of this note may be an electronic transcription/translation of spoken language to printed text using the Dragon dictation system.

## 2024-03-07 NOTE — ED NOTES
"Nursing report ED to floor  Debbie Cheng  73 y.o.  female    HPI :   Chief Complaint   Patient presents with    Abdominal Pain       Admitting doctor:   No admitting provider for patient encounter.    Admitting diagnosis:   The encounter diagnosis was Pulmonary mass.    Code status:   Current Code Status       Date Active Code Status Order ID Comments User Context       Not on file            Allergies:   Penicillins, Erythromycin, and Astelin [azelastine]    Isolation:   No active isolations    Intake and Output  No intake or output data in the 24 hours ending 03/06/24 2027    Weight:       03/06/24 1707   Weight: 68.9 kg (152 lb)       Most recent vitals:   Vitals:    03/06/24 1707 03/06/24 1832 03/06/24 1930   BP: 167/84 144/68    BP Location: Right arm Right arm    Patient Position: Lying Lying    Pulse: 87 92 86   Resp: 15 16    Temp: 97.7 °F (36.5 °C)     TempSrc: Oral     SpO2: 98% 91% 91%   Weight: 68.9 kg (152 lb)     Height: 154.9 cm (61\")         Active LDAs/IV Access:   Lines, Drains & Airways       Active LDAs       Name Placement date Placement time Site Days    Peripheral IV 03/06/24 1714 Right Antecubital 03/06/24 1714  Antecubital  less than 1                    Labs (abnormal labs have a star):   Labs Reviewed   COMPREHENSIVE METABOLIC PANEL - Abnormal; Notable for the following components:       Result Value    Glucose 113 (*)     BUN 32 (*)     BUN/Creatinine Ratio 32.3 (*)     eGFR 60.0 (*)     All other components within normal limits    Narrative:     GFR Normal >60  Chronic Kidney Disease <60  Kidney Failure <15    The GFR formula is only valid for adults with stable renal function between ages 18 and 70.   TROPONIN - Abnormal; Notable for the following components:    HS Troponin T 14 (*)     All other components within normal limits    Narrative:     High Sensitive Troponin T Reference Range:  <14.0 ng/L- Negative Female for AMI  <22.0 ng/L- Negative Male for AMI  >=14 - Abnormal Female " indicating possible myocardial injury.  >=22 - Abnormal Male indicating possible myocardial injury.   Clinicians would have to utilize clinical acumen, EKG, Troponin, and serial changes to determine if it is an Acute Myocardial Infarction or myocardial injury due to an underlying chronic condition.        CBC WITH AUTO DIFFERENTIAL - Abnormal; Notable for the following components:    WBC 14.52 (*)     Lymphocyte % 17.0 (*)     Monocyte % 13.0 (*)     Neutrophils, Absolute 10.00 (*)     Monocytes, Absolute 1.89 (*)     Immature Grans, Absolute 0.07 (*)     All other components within normal limits   HIGH SENSITIVITIY TROPONIN T 2HR - Abnormal; Notable for the following components:    HS Troponin T 14 (*)     All other components within normal limits    Narrative:     High Sensitive Troponin T Reference Range:  <14.0 ng/L- Negative Female for AMI  <22.0 ng/L- Negative Male for AMI  >=14 - Abnormal Female indicating possible myocardial injury.  >=22 - Abnormal Male indicating possible myocardial injury.   Clinicians would have to utilize clinical acumen, EKG, Troponin, and serial changes to determine if it is an Acute Myocardial Infarction or myocardial injury due to an underlying chronic condition.        COVID-19 AND FLU A/B, NP SWAB IN TRANSPORT MEDIA 1 HR TAT - Normal    Narrative:     Fact sheet for providers: https://www.fda.gov/media/703858/download    Fact sheet for patients: https://www.fda.gov/media/596127/download    Test performed by PCR.   LIPASE - Normal   URINALYSIS W/ MICROSCOPIC IF INDICATED (NO CULTURE) - Normal    Narrative:     Urine microscopic not indicated.   COVID PRE-OP / PRE-PROCEDURE SCREENING ORDER (NO ISOLATION)    Narrative:     The following orders were created for panel order COVID PRE-OP / PRE-PROCEDURE SCREENING ORDER (NO ISOLATION) - Swab, Nasopharynx.  Procedure                               Abnormality         Status                     ---------                                -----------         ------                     COVID-19 and FLU A/B PCR...[[id number]]  Normal              Final result                 Please view results for these tests on the individual orders.   CBC AND DIFFERENTIAL    Narrative:     The following orders were created for panel order CBC & Differential.  Procedure                               Abnormality         Status                     ---------                               -----------         ------                     CBC Auto Differential[[id number]]        Abnormal            Final result                 Please view results for these tests on the individual orders.       Results       Procedure Component Value Ref Range Date/Time    High Sensitivity Troponin T 2Hr [[id number]]  (Abnormal) Collected: 03/06/24 1907    Order Status: Completed Specimen: Blood Updated: 03/06/24 1953     HS Troponin T 14 <14 ng/L      Troponin T Delta 0 >=-4 - <+4 ng/L     Narrative:      High Sensitive Troponin T Reference Range:  <14.0 ng/L- Negative Female for AMI  <22.0 ng/L- Negative Male for AMI  >=14 - Abnormal Female indicating possible myocardial injury.  >=22 - Abnormal Male indicating possible myocardial injury.   Clinicians would have to utilize clinical acumen, EKG, Troponin, and serial changes to determine if it is an Acute Myocardial Infarction or myocardial injury due to an underlying chronic condition.         Urinalysis With Microscopic If Indicated (No Culture) - Urine, Clean Catch [[id number]]  (Normal) Collected: 03/06/24 1904    Order Status: Completed Specimen: Urine, Clean Catch Updated: 03/06/24 1930     Color, UA Yellow Yellow, Straw      Appearance, UA Clear Clear      pH, UA 5.5 4.5 - 8.0      Specific Gravity, UA 1.010 1.003 - 1.030      Glucose, UA Negative Negative      Ketones, UA Negative Negative      Bilirubin, UA Negative Negative      Blood, UA Negative Negative      Protein, UA Negative Negative      Leuk Esterase, UA Negative Negative       Nitrite, UA Negative Negative      Urobilinogen, UA 0.2 E.U./dL 0.2 - 1.0 E.U./dL     Narrative:      Urine microscopic not indicated.    COVID PRE-OP / PRE-PROCEDURE SCREENING ORDER (NO ISOLATION) - Swab, Nasopharynx [[id number]]  (Normal) Collected: 03/06/24 1715    Order Status: Completed Specimen: Swab from Nasopharynx Updated: 03/06/24 1742    Narrative:      The following orders were created for panel order COVID PRE-OP / PRE-PROCEDURE SCREENING ORDER (NO ISOLATION) - Swab, Nasopharynx.  Procedure                               Abnormality         Status                     ---------                               -----------         ------                     COVID-19 and FLU A/B PCR...[[id number]]  Normal              Final result                 Please view results for these tests on the individual orders.    COVID-19 and FLU A/B PCR, 1 HR TAT - Swab, Nasopharynx [[id number]]  (Normal) Collected: 03/06/24 1715    Order Status: Completed Specimen: Swab from Nasopharynx Updated: 03/06/24 1742     COVID19 Not Detected Not Detected - Ref. Range      Influenza A PCR Not Detected Not Detected      Influenza B PCR Not Detected Not Detected     Narrative:      Fact sheet for providers: https://www.fda.gov/media/833925/download    Fact sheet for patients: https://www.fda.gov/media/705534/download    Test performed by PCR.    High Sensitivity Troponin T [[id number]]  (Abnormal) Collected: 03/06/24 1713    Order Status: Completed Specimen: Blood Updated: 03/06/24 1741     HS Troponin T 14 <14 ng/L     Narrative:      High Sensitive Troponin T Reference Range:  <14.0 ng/L- Negative Female for AMI  <22.0 ng/L- Negative Male for AMI  >=14 - Abnormal Female indicating possible myocardial injury.  >=22 - Abnormal Male indicating possible myocardial injury.   Clinicians would have to utilize clinical acumen, EKG, Troponin, and serial changes to determine if it is an Acute Myocardial Infarction or myocardial injury due to an  underlying chronic condition.         Comprehensive Metabolic Panel [[id number]]  (Abnormal) Collected: 03/06/24 1713    Order Status: Completed Specimen: Blood Updated: 03/06/24 1738     Glucose 113 65 - 99 mg/dL      BUN 32 8 - 23 mg/dL      Creatinine 0.99 0.57 - 1.00 mg/dL      Sodium 138 136 - 145 mmol/L      Potassium 3.8 3.5 - 5.2 mmol/L      Chloride 101 98 - 107 mmol/L      CO2 22.9 22.0 - 29.0 mmol/L      Calcium 9.1 8.6 - 10.5 mg/dL      Total Protein 6.8 6.0 - 8.5 g/dL      Albumin 4.0 3.5 - 5.2 g/dL      ALT (SGPT) 15 1 - 33 U/L      AST (SGOT) 14 1 - 32 U/L      Alkaline Phosphatase 71 39 - 117 U/L      Total Bilirubin 0.5 0.0 - 1.2 mg/dL      Globulin 2.8 gm/dL      A/G Ratio 1.4 g/dL      BUN/Creatinine Ratio 32.3 7.0 - 25.0      Anion Gap 14.1 5.0 - 15.0 mmol/L      eGFR 60.0 >60.0 mL/min/1.73     Narrative:      GFR Normal >60  Chronic Kidney Disease <60  Kidney Failure <15    The GFR formula is only valid for adults with stable renal function between ages 18 and 70.    Lipase [[id number]]  (Normal) Collected: 03/06/24 1713    Order Status: Completed Specimen: Blood Updated: 03/06/24 1738     Lipase 26 13 - 60 U/L     CBC Auto Differential [[id number]]  (Abnormal) Collected: 03/06/24 1713    Order Status: Completed Specimen: Blood Updated: 03/06/24 1724     WBC 14.52 3.40 - 10.80 10*3/mm3      RBC 4.21 3.77 - 5.28 10*6/mm3      Hemoglobin 13.2 12.0 - 15.9 g/dL      Hematocrit 39.5 34.0 - 46.6 %      MCV 93.8 79.0 - 97.0 fL      MCH 31.4 26.6 - 33.0 pg      MCHC 33.4 31.5 - 35.7 g/dL      RDW 13.6 12.3 - 15.4 %      RDW-SD 46.5 37.0 - 54.0 fl      MPV 10.2 6.0 - 12.0 fL      Platelets 258 140 - 450 10*3/mm3      Neutrophil % 68.9 42.7 - 76.0 %      Lymphocyte % 17.0 19.6 - 45.3 %      Monocyte % 13.0 5.0 - 12.0 %      Eosinophil % 0.3 0.3 - 6.2 %      Basophil % 0.3 0.0 - 1.5 %      Immature Grans % 0.5 0.0 - 0.5 %      Neutrophils, Absolute 10.00 1.70 - 7.00 10*3/mm3      Lymphocytes, Absolute 2.47  0.70 - 3.10 10*3/mm3      Monocytes, Absolute 1.89 0.10 - 0.90 10*3/mm3      Eosinophils, Absolute 0.05 0.00 - 0.40 10*3/mm3      Basophils, Absolute 0.04 0.00 - 0.20 10*3/mm3      Immature Grans, Absolute 0.07 0.00 - 0.05 10*3/mm3      nRBC 0.0 0.0 - 0.2 /100 WBC              EKG:   ECG 12 Lead ED Triage Standing Order; Chest Pain   Final Result   HEART RATE= 90  bpm   RR Interval= 664  ms   MO Interval= 182  ms   P Horizontal Axis= 16  deg   P Front Axis= 54  deg   QRSD Interval= 102  ms   QT Interval= 384  ms   QTcB= 471  ms   QRS Axis= -26  deg   T Wave Axis= 67  deg   - OTHERWISE NORMAL ECG -   Sinus rhythm   Borderline left axis deviation   When compared with ECG of 14-May-2022 22:12:35,   Significant rate increase otherwise no change   Electronically Signed By: Slava Cerda ([facility]) 06-Mar-2024 17:42:13   Date and Time of Study: 2024-03-06 17:08:32          Meds given in ED:   Medications   sodium chloride 0.9 % flush 10 mL (has no administration in time range)   iopamidol (ISOVUE-370) 76 % injection 100 mL (100 mL Intravenous Given 3/6/24 7526)       Imaging results:  CT Abdomen Pelvis With Contrast    Result Date: 3/6/2024  1. There appears to be a right hilar mass with surrounding atelectasis. Right-sided pulmonary nodules are noted. Differential considerations for this finding include an area of atelectasis within the right middle lobe and juxtahilar region versus atypical infectious process though these are considered less likely. Transbronchial biopsy recommended. No definite evidence of distant metastatic disease. 2. No pulmonary embolus. 3. No acute abdominal or pelvic pathology. Electronically Signed: Juan Bajwa MD  3/6/2024 6:27 PM EST  Workstation ID: [id number]    CT Angiogram Chest    Result Date: 3/6/2024  1. There appears to be a right hilar mass with surrounding atelectasis. Right-sided pulmonary nodules are noted. Differential considerations for this finding include an area of atelectasis  within the right middle lobe and juxtahilar region versus atypical infectious process though these are considered less likely. Transbronchial biopsy recommended. No definite evidence of distant metastatic disease. 2. No pulmonary embolus. 3. No acute abdominal or pelvic pathology. Electronically Signed: Juan Bajwa MD  3/6/2024 6:27 PM EST  Workstation ID: [id number]    XR Chest 1 View    Result Date: 3/6/2024  Impression: No acute cardiopulmonary disease. Electronically Signed: Juan Bajwa MD  3/6/2024 5:54 PM EST  Workstation ID: [id number]     Ambulatory status:   - up ad bubba    Social issues:   Social History     Socioeconomic History    Marital status:    Tobacco Use    Smoking status: Never     Passive exposure: Never    Smokeless tobacco: Never    Tobacco comments:     Father was a geavy smoker   Vaping Use    Vaping status: Never Used   Substance and Sexual Activity    Alcohol use: No    Drug use: No    Sexual activity: Yes     Partners: Male       NIH Stroke Scale:         Santiago Diaz RN  03/06/24 20:27 EST

## 2024-03-07 NOTE — PLAN OF CARE
Goal Outcome Evaluation:    Progress: improving  Outcome Evaluation: Ms. Cheng admitted on 3/6 for lung mass. Bronch planned for tomorrow around 1300. NPO after midnight tonight - patient aware. No c/o pain, nausea, or SOA. Ambulated independently in hallway. Patient stable and needs met at this time.

## 2024-03-07 NOTE — PROGRESS NOTES
[facility] Clinical Pharmacy Services: Medication Reconciliation     Debbie Cheng is a 73 y.o. female presenting to [facility] for Lung mass [R91.8]       She  has a past medical history of Acute bacterial conjunctivitis of left eye (10/20/2016), Allergic rhinitis, Arrhythmia, Asthma, Bronchitis, Cancer, Colon polyp, Coronary artery disease (Afib), GERD (gastroesophageal reflux disease), Heart murmur, HL (hearing loss) (2020a), Hyperlipidemia, Hypertension, PAF (paroxysmal atrial fibrillation), Pneumonia, Premature atrial complexes (05/24/2022), PVCs (premature ventricular contractions) (05/24/2022), Rotator cuff tendonitis, Type 2 diabetes mellitus, UTI (urinary tract infection), and Vitamin D deficiency.       Allergies as of 03/06/2024 - Reviewed 03/06/2024   Allergen Reaction Noted    Penicillins Anaphylaxis and Swelling 10/25/2014    Erythromycin Other (See Comments) 10/25/2014    Astelin [azelastine] Cough 04/01/2022          Medication information was obtained from: Patient and Surescripts     Prior to Admission Medications       Prescriptions Last Dose Informant Patient Reported? Taking?    fluticasone (FLONASE) 50 MCG/ACT nasal spray 3/6/2024 Self Yes Yes    2 sprays into the nostril(s) as directed by provider Daily. Administer 2 sprays in each nostril for each dose.    glucose blood (OneTouch Ultra) test strip  Self No Yes    USE TO CHECK BLOOD SUGAR DAILY. USE AS INSTRUCTED.(E11.65,KS)    glucose monitor monitoring kit  Self No Yes    Use glucose meter to check fasting glucose once daily for diabetes monitoring. E11.9    levocetirizine (XYZAL) 5 MG tablet 3/6/2024 Self Yes Yes    Take 1 tablet by mouth Every Morning.    magnesium oxide (MAGOX) 400 (241.3 Mg) MG tablet tablet 3/6/2024 Self Yes Yes    Take 1 tablet by mouth Daily.    metoprolol tartrate (LOPRESSOR) 25 MG tablet 3/6/2024 Self No Yes    TAKE 1 TABLET BY MOUTH TWICE A DAY    montelukast (SINGULAIR) 10 MG tablet  Self No Yes     TAKE 1 TABLET BY MOUTH EVERY DAY AT NIGHT    multivitamin with minerals tablet tablet 3/5/2024 Self Yes Yes    Take 1 tablet by mouth Daily.    OneTouch Delica Lancets 33G misc  Self No Yes    USE ONE LANCET DAILY TO CHECK FASTING GLUCOSE FOR DIABETES MONITORING. E11.9    simvastatin (ZOCOR) 40 MG tablet  Self Yes Yes    Take 1 tablet by mouth Every Night. Finishing supply of simvastatin then will be transitioning to rosuvastatin.    valsartan-hydrochlorothiazide (DIOVAN-HCT) 320-12.5 MG per tablet 3/6/2024 Self No Yes    TAKE 1 TABLET BY MOUTH EVERY DAY    Patient taking differently:  Take 1 tablet by mouth Daily.    VENTOLIN  (90 Base) MCG/ACT inhaler 3/6/2024 Self Yes Yes    2 puffs Every 4 (Four) Hours As Needed for Shortness of Air or Wheezing.    vitamin D (ERGOCALCIFEROL) 1.25 MG (89824 UT) capsule capsule Past Week Self No Yes    TAKE 1 CAPSULE BY MOUTH ONE TIME PER WEEK    Patient taking differently:  1 capsule Every 7 (Seven) Days. On Sundays    Xarelto 20 MG tablet 3/5/2024 Self No Yes    TAKE 1 TABLET BY MOUTH EVERY DAY    Patient taking differently:  Take 1 tablet by mouth Daily With Dinner.    rosuvastatin (Crestor) 20 MG tablet  Self No No    Take 1 tablet by mouth Daily.           Medication notes:  Patient is finishing home supply of simvastatin prior to transitioning to rosuvastatin.        This medication list is complete to the best of my knowledge as of 3/7/2024       Please call if questions.     Jonelle Dominique [facility]  Clinical Pharmacist

## 2024-03-07 NOTE — CONSULTS
CONSULT NOTE    Patient Identification:  Debbie Cheng  73 y.o.  female  1950  [id number]            Requesting physician: Dr Steve Dean    Reason for Consultation:  abnormal ct chest, right hilar mass    CC: mid epigastric/back pain    History of Present Illness:  Patient is a very nice 73-year-old female with a previous medical history of asthma for which she takes Proventil as needed allergic rhinitis GERD coronary artery disease paroxysmal atrial fibrillation hypertension hyperlipidemia as well as type 2 diabetes who presented to the emergency room with a midepigastric pain radiating to her back that was worse when she took a deep breath.  Symptoms were rather sudden onset for about an hour or 2.  She was evaluated in the ER in [place] with a CT scan angio that showed no pulmonary emboli no aortic dissection and no pneumothorax.  No evidence of pancreatitis.  Patient has had a prior cholecystectomy.    CT scan of the chest did however reveal concern for a right middle lobe 1.1 cm groundglass nodule and a right hilar mass that is read as 6 x 3 cm.  No pulmonary emboli noted.  And no rib fractures.  Personally reviewed films.  Area of concern is very nebulous.  Not a well-delineated mass.  Patient does describe that she was having intermittent cough and congestion for 4 days prior to this episode.  Seen in urgent care tested negative for COVID and flu.  Given a prednisone pack however still having some trouble with that.    She describes prior diagnosis of reactive airway disease on Proventil no maintenance inhalers.  Does not see a pulmonologist.    Review of Systems:  As per HPI  12 system review of systems performed and all else negative    Past Medical History:   Diagnosis Date    Acute bacterial conjunctivitis of left eye 10/20/2016    Allergic rhinitis     Arrhythmia     Asthma     Bronchitis     Cancer     Basel Cell and Squamous cell    Colon polyp     Coronary artery disease Afib     GERD (gastroesophageal reflux disease)     Heart murmur     HL (hearing loss)     Hyperlipidemia     Hypertension     PAF (paroxysmal atrial fibrillation)     one episode, 2015; was briefly treated with Xarelto    Pneumonia     Premature atrial complexes 2022    PVCs (premature ventricular contractions) 2022    Rotator cuff tendonitis     Type 2 diabetes mellitus     UTI (urinary tract infection)     Vitamin D deficiency        Past Surgical History:   Procedure Laterality Date    CARDIAC CATHETERIZATION      Cardiologist takes care of     SECTION      CHOLECYSTECTOMY      COLONOSCOPY      EYE SURGERY Bilateral     cataract surgery    GUM SURGERY  2017    HYSTERECTOMY      KNEE SURGERY          Medications Prior to Admission   Medication Sig Dispense Refill Last Dose    fluticasone (FLONASE) 50 MCG/ACT nasal spray 2 sprays into the nostril(s) as directed by provider Daily. Administer 2 sprays in each nostril for each dose.   3/6/2024    glucose blood (OneTouch Ultra) test strip USE TO CHECK BLOOD SUGAR DAILY. USE AS INSTRUCTED.(E11.65,KS) 100 each 3     glucose monitor monitoring kit Use glucose meter to check fasting glucose once daily for diabetes monitoring. E11.9 1 each 0     levocetirizine (XYZAL) 5 MG tablet Take 1 tablet by mouth Every Morning.   3/6/2024    magnesium oxide (MAGOX) 400 (241.3 Mg) MG tablet tablet Take 1 tablet by mouth Daily.   3/6/2024    metoprolol tartrate (LOPRESSOR) 25 MG tablet TAKE 1 TABLET BY MOUTH TWICE A  tablet 1 3/6/2024    montelukast (SINGULAIR) 10 MG tablet TAKE 1 TABLET BY MOUTH EVERY DAY AT NIGHT 90 tablet 3     multivitamin with minerals tablet tablet Take 1 tablet by mouth Daily.   3/5/2024    OneTouch Delica Lancets 33G misc USE ONE LANCET DAILY TO CHECK FASTING GLUCOSE FOR DIABETES MONITORING. E11.9 100 each 12     valsartan-hydrochlorothiazide (DIOVAN-HCT) 320-12.5 MG per tablet TAKE 1 TABLET BY MOUTH EVERY DAY 90 tablet 3 3/6/2024     VENTOLIN  (90 Base) MCG/ACT inhaler USE 2 PUFFS EVERY 4-6 HOURS AS NEEDED FOR COUGHING ,WHEEZING OR SHORTNESS OF BREATH.  3 3/6/2024    vitamin D (ERGOCALCIFEROL) 1.25 MG (11140 UT) capsule capsule TAKE 1 CAPSULE BY MOUTH ONE TIME PER WEEK 12 capsule 3 Past Week    Xarelto 20 MG tablet TAKE 1 TABLET BY MOUTH EVERY DAY 90 tablet 3 3/5/2024    rosuvastatin (Crestor) 20 MG tablet Take 1 tablet by mouth Daily. 90 tablet 1     simvastatin (ZOCOR) 40 MG tablet Take 1 tablet by mouth Every Night.          Allergies   Allergen Reactions    Penicillins Anaphylaxis and Swelling    Erythromycin Other (See Comments)     JOINT PAIN AND SWELLING    Astelin [Azelastine] Cough       Social History     Socioeconomic History    Marital status:    Tobacco Use    Smoking status: Never     Passive exposure: Never    Smokeless tobacco: Never    Tobacco comments:     Father was a [employer] smoker   Vaping Use    Vaping status: Never Used   Substance and Sexual Activity    Alcohol use: No    Drug use: No    Sexual activity: Yes     Partners: Male       Family History   Problem Relation Age of Onset    Stroke Mother     Hypertension Mother     Heart disease Father     Heart attack Father     Alcohol abuse Father         Ptsd    Hypertension Sister     Colon polyps Sister     Heart disease Brother     Cancer Paternal Grandmother             Breast cancer Paternal Grandmother     Heart disease Paternal Grandfather     Diabetes Paternal Grandfather     Kidney disease Sister         Jacks2       Physical Exam:  LMP  (LMP Unknown)   There is no height or weight on file to calculate BMI.   General appearance: NAD, conversant   Eyes: Anicteric sclerae, moist conjunctivae; no lid lag;   HENT: Atraumatic; oropharynx clear with moist mucous membranes and no mucosal ulcerations; normal hard and soft palate  Neck: Trachea midline; supple  Lungs: CTA, with normal respiratory effort and no intercostal retractions  CV: RRR, no MRGs    Abdomen: No significant truncal obesity, bowel sounds positive  Extremities: No peripheral edema or extremity lymphadenopathy  Skin: Normal temperature, turgor and texture; no rash, ulcers or subcutaneous nodules  Psych: Appropriate affect, alert  Neuro prior to the gross intact speech intact moves all extremities    LABS:  Results from last 7 days   Lab Units 03/06/24  1713   WBC 10*3/mm3 14.52*   HEMOGLOBIN g/dL 13.2   PLATELETS 10*3/mm3 258     Results from last 7 days   Lab Units 03/06/24  1713   SODIUM mmol/L 138   POTASSIUM mmol/L 3.8   CHLORIDE mmol/L 101   CO2 mmol/L 22.9   BUN mg/dL 32*   CREATININE mg/dL 0.99   GLUCOSE mg/dL 113*   CALCIUM mg/dL 9.1   Estimated Creatinine Clearance: 44.2 mL/min (by C-G formula based on SCr of 0.99 mg/dL).    Imaging: I personally visualized the images of scans/x-rays performed within last 3 days.  Imaging Results (Most Recent)       None            Assessment / Recommendations:  Right hilar mass  Pulmonary nodule  Reactive airways disease without acute exacerbation  Cough  Diabetes  Hypertension  Hyperlipidemia  Midepigastric pain  Leukocytosis      CT scan of the chest did however reveal concern for a right middle lobe 1.1 cm groundglass nodule and a right hilar mass that is read as 6 x 3 cm.  No pulmonary emboli noted.  And no rib fractures.  Personally reviewed films.  Area of concern is very nebulous.  Not a well-delineated mass.    Send full respiratory viral panel  Send procalcitonin if positive would give antibiotics.     Reviewed CT scan personally  Discussed with ER provider  Discussed with the patient and her  all questions answered.    Described in general terms bronchoscopy procedure.  She does wish to proceed  Placed n.p.o.    Evaluation of patient's primary complaint midepigastric pain per admitting hospitalist service.    Patient seen by [facility] night coverage. Care will be assumed by another physician in the group in the morning.  Please call our answering service at [phone] with any concerns.        Juan Ramos MD  [facility]  03/06/24  23:06 EST

## 2024-03-07 NOTE — PLAN OF CARE
Goal Outcome Evaluation:  Plan of Care Reviewed With: patient        Progress: no change  Outcome Evaluation: Mrs. Cheng, 73y F, direct admit to [facility] from [facility] after sudden onset of midepipgastric pain that radiated to back on Lft. CT cx showed lung mass. Pulmonology consulted and planning to undergo bronch+biopsies today. NPO since MN. Patient is independent. Hx of a.fib, on xarelto - last taken night of (3/05). Resp panel negative. Vitals are stable.  is at bedside.

## 2024-03-08 ENCOUNTER — ANESTHESIA (OUTPATIENT)
Dept: GASTROENTEROLOGY | Facility: HOSPITAL | Age: 74
End: 2024-03-08
Payer: MEDICARE

## 2024-03-08 ENCOUNTER — ANESTHESIA EVENT (OUTPATIENT)
Dept: GASTROENTEROLOGY | Facility: HOSPITAL | Age: 74
End: 2024-03-08
Payer: MEDICARE

## 2024-03-08 LAB
GIE STN SPEC: NORMAL
GLUCOSE BLDC GLUCOMTR-MCNC: 123 MG/DL (ref 70–130)
GLUCOSE BLDC GLUCOMTR-MCNC: 137 MG/DL (ref 70–130)
GLUCOSE BLDC GLUCOMTR-MCNC: 138 MG/DL (ref 70–130)
GLUCOSE BLDC GLUCOMTR-MCNC: 202 MG/DL (ref 70–130)

## 2024-03-08 PROCEDURE — 25010000002 PROPOFOL 10 MG/ML EMULSION

## 2024-03-08 PROCEDURE — 87071 CULTURE AEROBIC QUANT OTHER: CPT | Performed by: HOSPITALIST

## 2024-03-08 PROCEDURE — 87070 CULTURE OTHR SPECIMN AEROBIC: CPT | Performed by: HOSPITALIST

## 2024-03-08 PROCEDURE — 25810000003 LACTATED RINGERS PER 1000 ML

## 2024-03-08 PROCEDURE — C1726 CATH, BAL DIL, NON-VASCULAR: HCPCS | Performed by: HOSPITALIST

## 2024-03-08 PROCEDURE — 0B9D8ZX DRAINAGE OF RIGHT MIDDLE LUNG LOBE, VIA NATURAL OR ARTIFICIAL OPENING ENDOSCOPIC, DIAGNOSTIC: ICD-10-PCS | Performed by: HOSPITALIST

## 2024-03-08 PROCEDURE — 63710000001 INSULIN LISPRO (HUMAN) PER 5 UNITS: Performed by: HOSPITALIST

## 2024-03-08 PROCEDURE — 94799 UNLISTED PULMONARY SVC/PX: CPT

## 2024-03-08 PROCEDURE — 87077 CULTURE AEROBIC IDENTIFY: CPT | Performed by: HOSPITALIST

## 2024-03-08 PROCEDURE — 94640 AIRWAY INHALATION TREATMENT: CPT

## 2024-03-08 PROCEDURE — 87205 SMEAR GRAM STAIN: CPT | Performed by: HOSPITALIST

## 2024-03-08 PROCEDURE — 87102 FUNGUS ISOLATION CULTURE: CPT | Performed by: HOSPITALIST

## 2024-03-08 PROCEDURE — 88173 CYTOPATH EVAL FNA REPORT: CPT | Performed by: HOSPITALIST

## 2024-03-08 PROCEDURE — 87116 MYCOBACTERIA CULTURE: CPT | Performed by: HOSPITALIST

## 2024-03-08 PROCEDURE — 87206 SMEAR FLUORESCENT/ACID STAI: CPT | Performed by: HOSPITALIST

## 2024-03-08 PROCEDURE — 82948 REAGENT STRIP/BLOOD GLUCOSE: CPT

## 2024-03-08 PROCEDURE — 87185 SC STD ENZYME DETCJ PER NZM: CPT | Performed by: HOSPITALIST

## 2024-03-08 PROCEDURE — 25810000003 SODIUM CHLORIDE 0.9 % SOLUTION: Performed by: HOSPITALIST

## 2024-03-08 PROCEDURE — 07D78ZX EXTRACTION OF THORAX LYMPHATIC, VIA NATURAL OR ARTIFICIAL OPENING ENDOSCOPIC, DIAGNOSTIC: ICD-10-PCS | Performed by: HOSPITALIST

## 2024-03-08 PROCEDURE — 88305 TISSUE EXAM BY PATHOLOGIST: CPT | Performed by: HOSPITALIST

## 2024-03-08 PROCEDURE — 88312 SPECIAL STAINS GROUP 1: CPT | Performed by: HOSPITALIST

## 2024-03-08 RX ORDER — PROPOFOL 10 MG/ML
VIAL (ML) INTRAVENOUS AS NEEDED
Status: DISCONTINUED | OUTPATIENT
Start: 2024-03-08 | End: 2024-03-08 | Stop reason: SURG

## 2024-03-08 RX ORDER — LIDOCAINE HYDROCHLORIDE 10 MG/ML
INJECTION, SOLUTION EPIDURAL; INFILTRATION; INTRACAUDAL; PERINEURAL AS NEEDED
Status: DISCONTINUED | OUTPATIENT
Start: 2024-03-08 | End: 2024-03-08 | Stop reason: HOSPADM

## 2024-03-08 RX ORDER — GUAIFENESIN 600 MG/1
600 TABLET, EXTENDED RELEASE ORAL EVERY 12 HOURS SCHEDULED
Status: DISCONTINUED | OUTPATIENT
Start: 2024-03-08 | End: 2024-03-09 | Stop reason: HOSPADM

## 2024-03-08 RX ORDER — SODIUM CHLORIDE, SODIUM LACTATE, POTASSIUM CHLORIDE, CALCIUM CHLORIDE 600; 310; 30; 20 MG/100ML; MG/100ML; MG/100ML; MG/100ML
INJECTION, SOLUTION INTRAVENOUS CONTINUOUS PRN
Status: DISCONTINUED | OUTPATIENT
Start: 2024-03-08 | End: 2024-03-08 | Stop reason: SURG

## 2024-03-08 RX ORDER — LIDOCAINE HYDROCHLORIDE 20 MG/ML
INJECTION, SOLUTION INFILTRATION; PERINEURAL AS NEEDED
Status: DISCONTINUED | OUTPATIENT
Start: 2024-03-08 | End: 2024-03-08 | Stop reason: SURG

## 2024-03-08 RX ORDER — BENZONATATE 100 MG/1
200 CAPSULE ORAL 3 TIMES DAILY PRN
Status: DISCONTINUED | OUTPATIENT
Start: 2024-03-08 | End: 2024-03-09 | Stop reason: HOSPADM

## 2024-03-08 RX ORDER — IPRATROPIUM BROMIDE AND ALBUTEROL SULFATE 2.5; .5 MG/3ML; MG/3ML
3 SOLUTION RESPIRATORY (INHALATION)
Status: DISCONTINUED | OUTPATIENT
Start: 2024-03-08 | End: 2024-03-08

## 2024-03-08 RX ORDER — IPRATROPIUM BROMIDE AND ALBUTEROL SULFATE 2.5; .5 MG/3ML; MG/3ML
3 SOLUTION RESPIRATORY (INHALATION) ONCE
Status: COMPLETED | OUTPATIENT
Start: 2024-03-08 | End: 2024-03-08

## 2024-03-08 RX ORDER — SODIUM CHLORIDE 9 MG/ML
30 INJECTION, SOLUTION INTRAVENOUS CONTINUOUS PRN
Status: DISCONTINUED | OUTPATIENT
Start: 2024-03-08 | End: 2024-03-09 | Stop reason: HOSPADM

## 2024-03-08 RX ADMIN — METOPROLOL TARTRATE 25 MG: 25 TABLET, FILM COATED ORAL at 08:56

## 2024-03-08 RX ADMIN — BENZONATATE 200 MG: 100 CAPSULE ORAL at 15:53

## 2024-03-08 RX ADMIN — FLUTICASONE PROPIONATE 2 SPRAY: 50 SPRAY, METERED NASAL at 08:26

## 2024-03-08 RX ADMIN — PROPOFOL 150 MG: 10 INJECTION, EMULSION INTRAVENOUS at 13:33

## 2024-03-08 RX ADMIN — SODIUM CHLORIDE 30 ML/HR: 9 INJECTION, SOLUTION INTRAVENOUS at 12:30

## 2024-03-08 RX ADMIN — SODIUM CHLORIDE, POTASSIUM CHLORIDE, SODIUM LACTATE AND CALCIUM CHLORIDE: 600; 310; 30; 20 INJECTION, SOLUTION INTRAVENOUS at 13:24

## 2024-03-08 RX ADMIN — VALSARTAN 320 MG: 320 TABLET, FILM COATED ORAL at 08:56

## 2024-03-08 RX ADMIN — METOPROLOL TARTRATE 25 MG: 25 TABLET, FILM COATED ORAL at 20:05

## 2024-03-08 RX ADMIN — ACETAMINOPHEN 325MG 650 MG: 325 TABLET ORAL at 20:04

## 2024-03-08 RX ADMIN — CETIRIZINE HYDROCHLORIDE 10 MG: 10 TABLET ORAL at 08:25

## 2024-03-08 RX ADMIN — LIDOCAINE HYDROCHLORIDE 60 MG: 20 INJECTION, SOLUTION INFILTRATION; PERINEURAL at 13:33

## 2024-03-08 RX ADMIN — MONTELUKAST SODIUM 10 MG: 10 TABLET, FILM COATED ORAL at 20:04

## 2024-03-08 RX ADMIN — IPRATROPIUM BROMIDE AND ALBUTEROL SULFATE 3 ML: .5; 3 SOLUTION RESPIRATORY (INHALATION) at 12:56

## 2024-03-08 RX ADMIN — PROPOFOL 200 MCG/KG/MIN: 10 INJECTION, EMULSION INTRAVENOUS at 13:34

## 2024-03-08 RX ADMIN — Medication 10 ML: at 08:26

## 2024-03-08 RX ADMIN — GUAIFENESIN 600 MG: 600 TABLET, EXTENDED RELEASE ORAL at 20:04

## 2024-03-08 RX ADMIN — PHENOL 1 SPRAY: 1.5 LIQUID ORAL at 22:25

## 2024-03-08 RX ADMIN — ROSUVASTATIN CALCIUM 20 MG: 20 TABLET, FILM COATED ORAL at 08:25

## 2024-03-08 RX ADMIN — HYDROCHLOROTHIAZIDE 12.5 MG: 12.5 TABLET ORAL at 08:56

## 2024-03-08 RX ADMIN — INSULIN LISPRO 4 UNITS: 100 INJECTION, SOLUTION INTRAVENOUS; SUBCUTANEOUS at 22:24

## 2024-03-08 RX ADMIN — Medication 10 ML: at 20:05

## 2024-03-08 NOTE — ANESTHESIA PROCEDURE NOTES
Airway  Urgency: elective    Date/Time: 3/8/2024 1:35 PM    General Information and Staff    Patient location during procedure: OR    Indications and Patient Condition  Indications for airway management: airway protection    Preoxygenated: yes  Mask difficulty assessment: 0 - not attempted    Final Airway Details  Final airway type: supraglottic airway      Successful airway: LMA  Size 4     Number of attempts at approach: 1  Assessment: lips, teeth, and gum same as pre-op

## 2024-03-08 NOTE — PLAN OF CARE
Goal Outcome Evaluation:           Progress: no change  Outcome Evaluation: VSS afebrile, pt has been NPO since midnight for bronchoscopy scheduled for 1300. Ambulates frequently around nurses station, no complaints of pain. Spouse remains at bedside. Uneventful shift. Plan of care ongoing.

## 2024-03-08 NOTE — CASE MANAGEMENT/SOCIAL WORK
Discharge Planning Assessment  [facility]     Patient Name: Debbie Cheng  MRN: [id number]  Today's Date: 3/8/2024    Admit Date: 3/6/2024    Plan: Home with spouse. No needs. Family transport   Discharge Needs Assessment       Row Name 03/08/24 1134       Living Environment    People in Home spouse    Current Living Arrangements home    Potentially Unsafe Housing Conditions none    In the past 12 months has the electric, gas, oil, or water company threatened to shut off services in your home? No    Primary Care Provided by self    Provides Primary Care For no one    Family Caregiver if Needed spouse    Quality of Family Relationships helpful;involved;supportive    Able to Return to Prior Arrangements yes       Resource/Environmental Concerns    Resource/Environmental Concerns none    Transportation Concerns none       Transportation Needs    In the past 12 months, has lack of transportation kept you from medical appointments or from getting medications? no    In the past 12 months, has lack of transportation kept you from meetings, work, or from getting things needed for daily living? No       Food Insecurity    Within the past 12 months, you worried that your food would run out before you got the money to buy more. Never true    Within the past 12 months, the food you bought just didn't last and you didn't have money to get more. Never true       Transition Planning    Patient/Family Anticipates Transition to home with family    Patient/Family Anticipated Services at Transition none    Transportation Anticipated family or friend will provide       Discharge Needs Assessment    Readmission Within the Last 30 Days no previous admission in last 30 days    Equipment Currently Used at Home glucometer    Concerns to be Addressed discharge planning    Anticipated Changes Related to Illness none    Equipment Needed After Discharge none    Provided Post Acute Provider List? N/A    N/A Provider List Comment Denies any  needs    Provided Post Acute Provider Quality & Resource List? N/A                   Discharge Plan       Row Name 03/08/24 1145       Plan    Plan Home with spouse. No needs. Family transport    Patient/Family in Agreement with Plan yes    Plan Comments Met with pt. at bedside. Explained roll of . Face sheet and pharmacy verified. Pt lives with her spouse. There are 3 steps to enter home. Home DME includes a glucometer.  Pt is independent with ADLs. Pt has never been to Rehab or used HH. Pt's PCP is Terra MYERS. Enrolled in meds to bed. Pt drives herself to appointments. At discharge, family will transport. Pt to have bronchoscopy with biopsy today. Possible D/C today or tomorrow. Explained that CCP would follow to assess for discharge needs.  Bogdan Pedraza RN-BC                  Continued Care and Services - Admitted Since 3/6/2024    No active coordination exists for this encounter.       Expected Discharge Date and Time       Expected Discharge Date Expected Discharge Time    Mar 9, 2024            Demographic Summary       Row Name 03/08/24 1134       General Information    Admission Type inpatient    Arrived From emergency department    Required Notices Provided Important Message from Medicare    Reason for Consult care coordination/care conference;discharge planning    Preferred Language English                   Functional Status       Row Name 03/08/24 1134       Functional Status    Usual Activity Tolerance good    Current Activity Tolerance good       Functional Status, IADL    Medications independent    Meal Preparation independent    Housekeeping independent    Laundry independent    Shopping independent       Mental Status    General Appearance WDL WDL       Mental Status Summary    Recent Changes in Mental Status/Cognitive Functioning no changes       Employment/    Employment Status retired               Bogdan Pedraza, RN

## 2024-03-08 NOTE — PROGRESS NOTES
Name: Debbie Cheng ADMIT: 3/6/2024   : 1950  PCP: Terra Reddy APRN    MRN: [id number] LOS: 2 days   AGE/SEX: 73 y.o. female  ROOM: [facility]     Subjective   Subjective   Still having some productive cough when seen this a.m.       Objective   Objective   Vital Signs  Temp:  [97.7 °F (36.5 °C)-99 °F (37.2 °C)] 97.7 °F (36.5 °C)  Heart Rate:  [72-81] 73  Resp:  [16-20] 20  BP: ()/(37-64) 103/55  SpO2:  [93 %-97 %] 95 %  on  Flow (L/min):  [2-4] 2;   Device (Oxygen Therapy): nasal cannula  Body mass index is 29.04 kg/m².  Physical Exam  Vitals reviewed.   Constitutional:       General: She is not in acute distress.     Appearance: She is not ill-appearing.   Cardiovascular:      Rate and Rhythm: Normal rate and regular rhythm.   Pulmonary:      Comments: Lungs fairly clear and unlabored  Abdominal:      General: There is no distension.      Palpations: Abdomen is soft.      Tenderness: There is no abdominal tenderness.   Musculoskeletal:      Right lower leg: No edema.      Left lower leg: No edema.   Skin:     General: Skin is warm and dry.   Neurological:      Mental Status: She is alert and oriented to person, place, and time.       Results Review     I reviewed the patient's new clinical results.  Results from last 7 days   Lab Units 24  0305 24  1713   WBC 10*3/mm3 10.95* 14.52*   HEMOGLOBIN g/dL 11.5* 13.2   PLATELETS 10*3/mm3 234 258     Results from last 7 days   Lab Units 24  0305 24  1713   SODIUM mmol/L 138 138   POTASSIUM mmol/L 3.9 3.8   CHLORIDE mmol/L 101 101   CO2 mmol/L 24.4 22.9   BUN mg/dL 22 32*   CREATININE mg/dL 0.79 0.99   GLUCOSE mg/dL 155* 113*   EGFR mL/min/1.73 78.6 60.0*     Results from last 7 days   Lab Units 24  1713   ALBUMIN g/dL 4.0   BILIRUBIN mg/dL 0.5   ALK PHOS U/L 71   AST (SGOT) U/L 14   ALT (SGPT) U/L 15     Results from last 7 days   Lab Units 24  0305 24  1713   CALCIUM mg/dL 8.6 9.1   ALBUMIN g/dL  --  4.0      Results from last 7 days   Lab Units 03/06/24  1907   PROCALCITONIN ng/mL <0.02     Glucose   Date/Time Value Ref Range Status   03/08/2024 1648 138 (H) 70 - 130 mg/dL Final   03/08/2024 1201 123 70 - 130 mg/dL Final   03/08/2024 0818 137 (H) 70 - 130 mg/dL Final   03/07/2024 2057 156 (H) 70 - 130 mg/dL Final   03/07/2024 1709 151 (H) 70 - 130 mg/dL Final       CT Abdomen Pelvis With Contrast    Result Date: 3/6/2024  1. There appears to be a right hilar mass with surrounding atelectasis. Right-sided pulmonary nodules are noted. Differential considerations for this finding include an area of atelectasis within the right middle lobe and juxtahilar region versus atypical infectious process though these are considered less likely. Transbronchial biopsy recommended. No definite evidence of distant metastatic disease. 2. No pulmonary embolus. 3. No acute abdominal or pelvic pathology. Electronically Signed: Juan Bajwa MD  3/6/2024 6:27 PM EST  Workstation ID: [id number]    CT Angiogram Chest    Result Date: 3/6/2024  1. There appears to be a right hilar mass with surrounding atelectasis. Right-sided pulmonary nodules are noted. Differential considerations for this finding include an area of atelectasis within the right middle lobe and juxtahilar region versus atypical infectious process though these are considered less likely. Transbronchial biopsy recommended. No definite evidence of distant metastatic disease. 2. No pulmonary embolus. 3. No acute abdominal or pelvic pathology. Electronically Signed: Juan Bajwa MD  3/6/2024 6:27 PM EST  Workstation ID: [id number]    XR Chest 1 View    Result Date: 3/6/2024  Impression: No acute cardiopulmonary disease. Electronically Signed: Juan Bajwa MD  3/6/2024 5:54 PM EST  Workstation ID: [id number]     I have personally reviewed all medications:  Scheduled Medications  cetirizine, 10 mg, Oral, Daily  fluticasone, 2 spray, Nasal, Daily  valsartan, 320 mg, Oral, Q24H    And  hydroCHLOROthiazide, 12.5 mg, Oral, Q24H  insulin lispro, 2-9 Units, Subcutaneous, 4x Daily AC & at Bedtime  metoprolol tartrate, 25 mg, Oral, BID  montelukast, 10 mg, Oral, Nightly  rosuvastatin, 20 mg, Oral, Daily  sodium chloride, 10 mL, Intravenous, Q12H    Infusions  sodium chloride, 30 mL/hr, Last Rate: 30 mL/hr (03/08/24 1230)    Diet  Diet: Regular/House; Fluid Consistency: Thin (IDDSI 0)    I have personally reviewed:  [x]  Laboratory   [x]  Microbiology   []  Radiology   []  EKG/Telemetry  []  Cardiology/Vascular   []  Pathology    []  Records       Assessment/Plan     Active Hospital Problems    Diagnosis  POA    **Lung mass [R91.8]  Unknown    Controlled type 2 diabetes mellitus without complication, without long-term current use of insulin [E11.9]  Yes    Hyperlipidemia [E78.5]  Yes    Hypertension [I10]  Yes    Paroxysmal atrial fibrillation [I48.0]  Yes      Resolved Hospital Problems   No resolved problems to display.       73 y.o. female admitted with Lung mass.    Status post bronchoscopy/EBUS this afternoon.  Follow-up results  - Note thick secretions suctioned.  Follow-up BAL as well  - Wean O2 as tolerated.  Was not requiring a prior to procedure  - Added cough medicines as well as Mucinex to help with secretions    PAF: Continue metoprolol.  Resume Xarelto when okay with pulmonology    DM2 stable.  Correctional insulin available as needed      SCDs  Dispo: Anticipate home tomorrow      Sunil Hilario MD  [facility]  03/08/24  16:53 EST

## 2024-03-08 NOTE — OP NOTE
Bronchoscopy Procedure Note    Procedure:  Bronchoscopy, Diagnostic  Bronchoscopy, Therapeutic  Bronchoalveolar lavage, BAL  Endobronchial ultrasound and transbronchial needle aspiration biopsies of lymph node station 7, 11 R superior, right hilar mass    Pre-Operative Diagnosis: Lymphadenopathy    Post-Operative Diagnosis: Same    Indication: Lymphadenopathy    Anesthesia: General Anesthesia    Procedure Details: Patient was consented for the procedure with all risk and benefit of the procedure explained in detail.  Patient was given the opportunity to ask questions and all concerns were answered.  The bronchocope was inserted into the main airway via the LMA. An anatomical survey was done of the main airways and the subsegmental bronchus to at least the first subsegmental level of all five lobes of both lungs.  The findings are reported below.  A bronchoalveolar lavage was performed using aliquots of normal saline instilled into the airways then aspirated back.    Findings:  Bronchoscope passed through LMA to the level of the vocal cords.  Lidocaine used for local anesthetic over vocal cords.  Bronchoscope was passed between the vocal cords into the trachea.  All airways were visualized to at least the first subsegment level of all 5 lobes of both lungs.  Airways were of normal size and caliber.  Thick mucoid secretions were present throughout the trachea and bilateral mainstem bronchi.  Therapeutic aspiration of secretions was performed.  No endobronchial lesions seen.    Endobronchial ultrasound bronchoscope was introduced and mediastinal survey was conducted.  Lymph nodes at station 7 and 11 R superior were enlarged.  Also right hilar mass was found.  Transbronchial needle aspiration biopsies were obtained at lymph node stations 7, 11 R superior, right hilar mass.  RADHA demonstrated lymphocytes and inflammatory tissue.    EBUS bronchoscope was then removed.  Flexible bronchoscope was introduced and navigated to  the right middle lobe medial segment where of saline was instilled and 45 cc withdrawn.  This was sent for cultures.    Estimated Blood Loss:  Minimal           Specimens:    Transbronchial needle aspiration biopsies of lymph node station 7, 11 R, hilar mass.  Sent for cytology and culture.  Bronchoalveolar lavage of the right middle lobe sent for cultures.                Complications:  None; patient tolerated the procedure well.           Disposition: PACU - hemodynamically stable.    Patient tolerated the procedure well.    Mauro Galloway MD  3/8/2024  15:07 EST

## 2024-03-08 NOTE — PLAN OF CARE
Goal Outcome Evaluation:  Plan of Care Reviewed With: patient        Progress: no change  Outcome Evaluation: Vitals stable. Bronch completed this afternoon. Pt advanced to regular diet after bronch. Pt ambulating. Pt needs met and questions answered. Will continue to monitor.

## 2024-03-08 NOTE — CASE MANAGEMENT/SOCIAL WORK
Continued Stay Note  [facility]     Patient Name: Debbie Cheng  MRN: [id number]  Today's Date: 3/8/2024    Admit Date: 3/6/2024    Plan: Home with spouse. No needs. Family transport   Discharge Plan       Row Name 03/08/24 1145       Plan    Plan Home with spouse. No needs. Family transport    Patient/Family in Agreement with Plan yes    Plan Comments Met with pt. at bedside. Explained roll of . Face sheet and pharmacy verified. Pt lives with her spouse. There are 3 steps to enter home. Home DME includes a glucometer.  Pt is independent with ADLs. Pt has never been to Rehab or used HH. Pt's PCP is Terra MYERS. Enrolled in meds to bed. Pt drives herself to appointments. At discharge, family will transport. Pt to have bronchoscopy with biopsy today. Possible D/C today or tomorrow. Explained that CCP would follow to assess for discharge needs.  Bogdan Pedraza RN-BC                   Discharge Codes    No documentation.                 Expected Discharge Date and Time       Expected Discharge Date Expected Discharge Time    Mar 9, 2024               Bogdan Pedraza RN

## 2024-03-08 NOTE — ANESTHESIA PREPROCEDURE EVALUATION
Anesthesia Evaluation     Patient summary reviewed and Nursing notes reviewed   history of anesthetic complications:  PONV  NPO Solid Status: > 8 hours  NPO Liquid Status: > 2 hours           Airway   Mallampati: II  TM distance: >3 FB  No difficulty expected  Dental - normal exam     Pulmonary - normal exam   (+) asthma,  (-) COPD    ROS comment: Lung mass  Cardiovascular   Exercise tolerance: good (4-7 METS)    Patient on routine beta blocker and Beta blocker given within 24 hours of surgery  Rhythm: regular    (+) hypertension (patient received her antihypertensives this morning including ARB) 2 medications or greater, valvular problems/murmurs murmur, CAD, dysrhythmias Paroxysmal Atrial Fib, hyperlipidemia  (-) past MI, angina, cardiac stents      Neuro/Psych  (-) seizures, CVA  GI/Hepatic/Renal/Endo    (+) diabetes mellitus type 2  (-) GERD, liver disease, no renal disease    Musculoskeletal     Abdominal    Substance History - negative use     OB/GYN          Other - negative ROS                     Anesthesia Plan    ASA 3     general     (I have reviewed the patient's history and chart with the patient, including all pertinent laboratory results and imaging. I have explained the risks of anesthesia including but not limited to dental or airway injury, nausea, cardio-pulmonary complications, such as aspiration, MI, stroke, or death.     VITALS:  /62 (BP Location: Left arm, Patient Position: Lying)   Pulse 75   Temp 37.2 °C (99 °F) (Oral)   Resp 18   Wt 69.7 kg (153 lb 11.2 oz)   LMP  (LMP Unknown)   SpO2 94%   BMI 29.04 kg/m² )  intravenous induction     Anesthetic plan, risks, benefits, and alternatives have been provided, discussed and informed consent has been obtained with: patient.  Pre-procedure education provided      CODE STATUS:    Code Status (Patient has no pulse and is not breathing): CPR (Attempt to Resuscitate)  Medical Interventions (Patient has pulse or is breathing): Full  Support

## 2024-03-08 NOTE — PROGRESS NOTES
Consult Daily Progress Note  [address]  9/23/2023    Patient Name:  Debbie Cheng  MRN:  [id number]   YOB: 1950  Age: 73 y.o.  Sex: female  LOS: 2    Reason for Consult:  Right hilar mass    Hospital Course:   73-year-old female with a history of asthma, allergic rhinitis, coronary disease, atrial fibrillation who presented with midepigastric pain with CT revealing right hilar mass.    Interval History:  No acute events overnight  Epigastric pain is improved  No shortness of breath or chest pain  Mild cough with some secretions which are difficult to bring up  No nausea or vomiting  Awaiting bronchoscopy today    Physical Exam:  Vitals:    03/07/24 2340   BP: 128/60   Pulse: 77   Resp: 16   Temp: 99 °F (37.2 °C)   SpO2: 97%       Intake/Output    Intake/Output Summary (Last 24 hours) at 3/8/2024 0834  Last data filed at 3/8/2024 0500  Gross per 24 hour   Intake 1080 ml   Output --   Net 1080 ml       General: Alert, nontoxic, NAD  HEENT: NC/AT, EOMI, MMM  Neck: Supple, trachea midline  Cardiac: RRR, no murmur, gallops, rubs  Pulmonary: Clear to auscultation bilaterally, no adventitious breath sounds, normal respiratory effort  GI: Soft, non-tender, non-distended, normal bowel sounds  Extremities: Warm, well perfused, no LE edema  Skin: no visible rash  Neuro: CN II - XII grossly intact  Psychiatry: Normal mood and affect      Data Review:  Results from last 7 days   Lab Units 03/07/24  0305 03/06/24  1713   WBC 10*3/mm3 10.95* 14.52*   HEMOGLOBIN g/dL 11.5* 13.2   PLATELETS 10*3/mm3 234 258     Results from last 7 days   Lab Units 03/07/24  0305 03/06/24  1713   SODIUM mmol/L 138 138   POTASSIUM mmol/L 3.9 3.8   CHLORIDE mmol/L 101 101   CO2 mmol/L 24.4 22.9   BUN mg/dL 22 32*   CREATININE mg/dL 0.79 0.99   GLUCOSE mg/dL 155* 113*   CALCIUM mg/dL 8.6 9.1   Estimated Creatinine Clearance: 55.8 mL/min (by C-G formula based on SCr of 0.79 mg/dL).    Results from last 7 days   Lab  Units 03/07/24  0305 03/06/24  1907 03/06/24  1713   AST (SGOT) U/L  --   --  14   ALT (SGPT) U/L  --   --  15   PROCALCITONIN ng/mL  --  <0.02  --    PLATELETS 10*3/mm3 234  --  258               Imaging:  Reviewed chest images personally from past 3 days    ASSESSMENT  /  PLAN:    Right hilar mass  Right upper lobe groundglass pulmonary nodule  Reactive airways disease without acute exacerbation  Cough  Diabetes  Hypertension  Hyperlipidemia  Midepigastric pain  Leukocytosis    -CT chest demonstrates right hilar mass which would be amenable to bronchoscopy and EBUS for diagnosis.  Right upper lobe groundglass nodule is distal in nature and will need to be monitored.  -Plan for bronchoscopy, EBUS/TBNA today.    Discussed bronchoscopy with EBUS, bronchoalveolar lavage, transbronchial biopsy, endobronchial brushing, airway inspection.  Risks and benefits of the procedure were discussed in detail.  Alternatives and options were reviewed.  Risks including pneumothorax, pneumonia, bleeding, respiratory depression and that it could be fatal were all reviewed.  Patient understands and is agreeable for the procedure.     Thank you for allowing us to participate in this patients care. Pulmonary will continue to follow.     Mauro Galloway MD  [facility]  Pulmonary and Critical Care Medicine, Interventional Pulmonology    Parts of this note may be an electronic transcription/translation of spoken language to printed text using the Dragon dictation system.

## 2024-03-09 ENCOUNTER — READMISSION MANAGEMENT (OUTPATIENT)
Dept: CALL CENTER | Facility: HOSPITAL | Age: 74
End: 2024-03-09
Payer: MEDICARE

## 2024-03-09 VITALS
TEMPERATURE: 98.6 F | RESPIRATION RATE: 20 BRPM | SYSTOLIC BLOOD PRESSURE: 125 MMHG | HEART RATE: 76 BPM | OXYGEN SATURATION: 94 % | WEIGHT: 153.7 LBS | BODY MASS INDEX: 29.04 KG/M2 | DIASTOLIC BLOOD PRESSURE: 52 MMHG

## 2024-03-09 LAB — GLUCOSE BLDC GLUCOMTR-MCNC: 127 MG/DL (ref 70–130)

## 2024-03-09 PROCEDURE — 82948 REAGENT STRIP/BLOOD GLUCOSE: CPT

## 2024-03-09 RX ADMIN — ACETAMINOPHEN 325MG 650 MG: 325 TABLET ORAL at 08:03

## 2024-03-09 RX ADMIN — ROSUVASTATIN CALCIUM 20 MG: 20 TABLET, FILM COATED ORAL at 08:02

## 2024-03-09 RX ADMIN — FLUTICASONE PROPIONATE 2 SPRAY: 50 SPRAY, METERED NASAL at 08:03

## 2024-03-09 RX ADMIN — METOPROLOL TARTRATE 25 MG: 25 TABLET, FILM COATED ORAL at 08:03

## 2024-03-09 RX ADMIN — GUAIFENESIN 600 MG: 600 TABLET, EXTENDED RELEASE ORAL at 08:03

## 2024-03-09 RX ADMIN — CETIRIZINE HYDROCHLORIDE 10 MG: 10 TABLET ORAL at 08:02

## 2024-03-09 RX ADMIN — HYDROCHLOROTHIAZIDE 12.5 MG: 12.5 TABLET ORAL at 08:03

## 2024-03-09 RX ADMIN — Medication 10 ML: at 08:03

## 2024-03-09 RX ADMIN — VALSARTAN 320 MG: 320 TABLET, FILM COATED ORAL at 08:02

## 2024-03-09 NOTE — DISCHARGE SUMMARY
Patient Name: Debbie Cheng  : 1950  MRN: [id number]    Date of Admission: 3/6/2024  Date of Discharge:  3/9/2024  Primary Care Physician: Terra Reddy APRN      Chief Complaint:   No chief complaint on file.      Discharge Diagnoses     Active Hospital Problems    Diagnosis  POA    **Lung mass [R91.8]  Yes    Controlled type 2 diabetes mellitus without complication, without long-term current use of insulin [E11.9]  Yes    Hyperlipidemia [E78.5]  Yes    Hypertension [I10]  Yes    Paroxysmal atrial fibrillation [I48.0]  Yes      Resolved Hospital Problems   No resolved problems to display.        Hospital Course     Ms. Cheng is a 73 y.o. female with a history of PAF and hypertension presenting [facility] with [facility].  Workup in ED included a CT angiogram which showed right hilar mass with atelectasis and some right-sided pulmonary nodules.  She was transferred to [facility] for pulmonary consultation and additional management.  She was not started any antibiotics as she does not have any overt pneumonia.  She was seen by pulmonology and bronchoscopy was performed yesterday with BAL and lymph node biopsies performed.  Cultures are pending of course but so far have shown no growth.  Biopsies will not be available for a couple of days.  Her chest pain has resolved several days ago and she otherwise is stable for discharge.  She has been instructed to continue guaifenesin for secretions which did help some overnight and she will use over-the-counter cough medicines.  She is aware the need for follow-up with pulmonology next week in their office and will contact them if she does not hear anything about an appointment by Monday.      Day of Discharge     Subjective:  No complaints today    Physical Exam:  Temp:  [97.7 °F (36.5 °C)-100.4 °F (38 °C)] 98.6 °F (37 °C)  Heart Rate:  [72-91] 76  Resp:  [16-20] 20  BP: ()/(37-66) 125/52  Body mass index is 29.04 kg/m².  Physical Exam  Vitals  reviewed.   Constitutional:       General: She is not in acute distress.     Appearance: She is not ill-appearing.   Cardiovascular:      Rate and Rhythm: Normal rate and regular rhythm.   Pulmonary:      Effort: Pulmonary effort is normal. No respiratory distress.      Breath sounds: No wheezing.   Abdominal:      General: There is no distension.      Palpations: Abdomen is soft.      Tenderness: There is no abdominal tenderness.   Musculoskeletal:      Right lower leg: No edema.      Left lower leg: No edema.   Skin:     General: Skin is warm and dry.   Neurological:      Mental Status: She is alert and oriented to person, place, and time.         Consultants     Consult Orders (all) (From admission, onward)       Start     Ordered    03/06/24 2310  Inpatient Pulmonology Consult  Once        Specialty:  Pulmonary Disease  Provider:  Jam Ramos MD    03/06/24 2313                  Procedures     BRONCHOSCOPY WITH ENDOBRONCHIAL ULTRASOUND with FNA's, BAL    Imaging Results (All)       None              Pertinent Labs     Results from last 7 days   Lab Units 03/07/24  0305 03/06/24  1713   WBC 10*3/mm3 10.95* 14.52*   HEMOGLOBIN g/dL 11.5* 13.2   PLATELETS 10*3/mm3 234 258     Results from last 7 days   Lab Units 03/07/24  0305 03/06/24  1713   SODIUM mmol/L 138 138   POTASSIUM mmol/L 3.9 3.8   CHLORIDE mmol/L 101 101   CO2 mmol/L 24.4 22.9   BUN mg/dL 22 32*   CREATININE mg/dL 0.79 0.99   GLUCOSE mg/dL 155* 113*   EGFR mL/min/1.73 78.6 60.0*     Results from last 7 days   Lab Units 03/06/24  1713   ALBUMIN g/dL 4.0   BILIRUBIN mg/dL 0.5   ALK PHOS U/L 71   AST (SGOT) U/L 14   ALT (SGPT) U/L 15     Results from last 7 days   Lab Units 03/07/24  0305 03/06/24  1713   CALCIUM mg/dL 8.6 9.1   ALBUMIN g/dL  --  4.0     Results from last 7 days   Lab Units 03/06/24  1713   LIPASE U/L 26     Results from last 7 days   Lab Units 03/07/24  0305 03/06/24  1907 03/06/24  1713   HSTROP T ng/L 20* 14* 14*           Invalid  "input(s): \"LDLCALC\"      Results from last 7 days   Lab Units 03/07/24  0147   COVID19  Not Detected       Test Results Pending at Discharge     Pending Labs       Order Current Status    AFB Culture - Fine Needle Aspirate, Lung, R In process    AFB Culture - Lavage, Lung, Right Middle Lobe In process    Fine Needle Aspiration In process    Fungus Culture - Fine Needle Aspirate, Lung, R In process    Fungus Culture - Lavage, Lung, Right Middle Lobe In process    BAL Culture, Quantitative - Lavage, Lung, Right Middle Lobe Preliminary result    Respiratory Culture - Fine Needle Aspirate, Lung, R Preliminary result            Discharge Details        Discharge Medications        Changes to Medications        Instructions Start Date   vitamin D 1.25 MG (82916 UT) capsule capsule  Commonly known as: ERGOCALCIFEROL  What changed:   how to take this  additional instructions   TAKE 1 CAPSULE BY MOUTH ONE TIME PER WEEK      Xarelto 20 MG tablet  Generic drug: rivaroxaban  What changed:   how much to take  when to take this   TAKE 1 TABLET BY MOUTH EVERY DAY             Continue These Medications        Instructions Start Date   fluticasone 50 MCG/ACT nasal spray  Commonly known as: FLONASE   2 sprays, Nasal, Daily, Administer 2 sprays in each nostril for each dose.       glucose monitor monitoring kit   Use glucose meter to check fasting glucose once daily for diabetes monitoring. E11.9      levocetirizine 5 MG tablet  Commonly known as: XYZAL   5 mg, Oral, Every Morning      magnesium oxide 400 (241.3 Mg) MG tablet tablet  Commonly known as: MAGOX   400 mg, Oral, Daily      metoprolol tartrate 25 MG tablet  Commonly known as: LOPRESSOR   TAKE 1 TABLET BY MOUTH TWICE A DAY      montelukast 10 MG tablet  Commonly known as: SINGULAIR   TAKE 1 TABLET BY MOUTH EVERY DAY AT NIGHT      multivitamin with minerals tablet tablet   1 tablet, Oral, Daily      OneTouch Delica Lancets 33G misc   USE ONE LANCET DAILY TO CHECK FASTING " GLUCOSE FOR DIABETES MONITORING. E11.9      OneTouch Ultra test strip  Generic drug: glucose blood   USE TO CHECK BLOOD SUGAR DAILY. USE AS INSTRUCTED.(E11.65,KS)      rosuvastatin 20 MG tablet  Commonly known as: Crestor   20 mg, Oral, Daily      valsartan-hydrochlorothiazide 320-12.5 MG per tablet  Commonly known as: DIOVAN-HCT   TAKE 1 TABLET BY MOUTH EVERY DAY      Ventolin  (90 Base) MCG/ACT inhaler  Generic drug: albuterol sulfate HFA   2 puffs Every 4 (Four) Hours As Needed for Shortness of Air or Wheezing.             Stop These Medications      simvastatin 40 MG tablet  Commonly known as: ZOCOR              Allergies   Allergen Reactions    Penicillins Anaphylaxis and Swelling    Erythromycin Other (See Comments)     JOINT PAIN AND SWELLING    Astelin [Azelastine] Cough       Discharge Disposition:  Home or Self Care      Discharge Diet:  Diet Order   Procedures    Diet: Regular/House; Fluid Consistency: Thin (IDDSI 0)       Discharge Activity:       CODE STATUS:    Code Status and Medical Interventions:   Ordered at: 03/06/24 2397     Code Status (Patient has no pulse and is not breathing):    CPR (Attempt to Resuscitate)     Medical Interventions (Patient has pulse or is breathing):    Full Support       Future Appointments   Date Time Provider Department Center   5/10/2024  8:00 AM  [facility]   8/19/2024  8:50 AM [facility]   8/26/2024  9:15 AM Terra Reddy APRN [facility]   12/3/2024 10:00 AM Keon Mann MD [facility]      Follow-up Information       Terra Reddy APRN .    Specialties: Family Medicine, Emergency Medicine  Contact information:  [address]    [place] [id number] [phone]               Mauro Galloway MD. Schedule an appointment as soon as possible for a visit in 2 week(s).    Specialties: Pulmonary Disease, Intensive Care  Contact information:  [address]  [facility]  [address]  [phone]                             Time Spent on Discharge:  Greater than 30 minutes      Sunil Hilario MD  [facility]  03/09/24  09:38 EST

## 2024-03-09 NOTE — ANESTHESIA POSTPROCEDURE EVALUATION
Patient: Debbie Cheng    Procedure Summary       Date: 03/08/24 Room / Location:  [facility]    Anesthesia Start: 1324 Anesthesia Stop: 1445    Procedure: BRONCHOSCOPY WITH ENDOBRONCHIAL ULTRASOUND with FNA's, BAL (Bronchus) Diagnosis:       Lung mass      (Lung mass [R91.8])    Surgeons: Mauro Galloway MD Provider: Jules Rajan MD    Anesthesia Type: general ASA Status: 3            Anesthesia Type: general    Vitals  Vitals Value Taken Time   /54 03/08/24 1506   Temp     Pulse 73 03/08/24 1506   Resp 20 03/08/24 1506   SpO2 96 % 03/08/24 1506           Post Anesthesia Care and Evaluation    Patient location during evaluation: bedside  Patient participation: complete - patient participated  Level of consciousness: awake and alert  Pain management: adequate    Airway patency: patent  Anesthetic complications: No anesthetic complications    Cardiovascular status: acceptable  Respiratory status: acceptable  Hydration status: acceptable    Comments: /66 (BP Location: Left arm, Patient Position: Lying)   Pulse 81   Temp 37.7 °C (99.9 °F) (Oral)   Resp 16   Wt 69.7 kg (153 lb 11.2 oz)   LMP  (LMP Unknown)   SpO2 97%   BMI 29.04 kg/m²

## 2024-03-09 NOTE — PLAN OF CARE
Goal Outcome Evaluation:  Plan of Care Reviewed With: patient        Progress: improving  Outcome Evaluation: Pt stable. Pt to dc home w spouse

## 2024-03-09 NOTE — OUTREACH NOTE
Prep Survey      Flowsheet Row Responses   [facility] [place] patient discharged from? [place]   Is LACE score < 7 ? No   Eligibility [facility]   Date of Admission 03/06/24   Date of Discharge 03/09/24   Discharge Disposition Home or Self Care   Discharge diagnosis Lung mass   Does the patient have one of the following disease processes/diagnoses(primary or secondary)? Other   Prep survey completed? Yes            Hailee SALGADO - Registered Nurse

## 2024-03-10 LAB
BACTERIA SPEC AEROBE CULT: ABNORMAL
BACTERIA SPEC AEROBE CULT: ABNORMAL
BACTERIA SPEC RESP CULT: ABNORMAL
BACTERIA SPEC RESP CULT: ABNORMAL
GRAM STN SPEC: ABNORMAL
NIGHT BLUE STAIN TISS: NORMAL
NIGHT BLUE STAIN TISS: NORMAL

## 2024-03-10 NOTE — CASE MANAGEMENT/SOCIAL WORK
Case Management Discharge Note      Final Note: dc home    Provided Post Acute Provider List?: N/A  N/A Provider List Comment: Denies any needs  Provided Post Acute Provider Quality & Resource List?: N/A    Selected Continued Care - Discharged on 3/9/2024 Admission date: 3/6/2024 - Discharge disposition: Home or Self Care      Destination    No services have been selected for the patient.                Durable Medical Equipment    No services have been selected for the patient.                Dialysis/Infusion    No services have been selected for the patient.                Home Medical Care    No services have been selected for the patient.                Therapy    No services have been selected for the patient.                Community Resources    No services have been selected for the patient.                Community & DME    No services have been selected for the patient.                         Final Discharge Disposition Code: 01 - home or self-care

## 2024-03-11 ENCOUNTER — TRANSITIONAL CARE MANAGEMENT TELEPHONE ENCOUNTER (OUTPATIENT)
Dept: CALL CENTER | Facility: HOSPITAL | Age: 74
End: 2024-03-11
Payer: MEDICARE

## 2024-03-11 ENCOUNTER — TELEPHONE (OUTPATIENT)
Dept: INTERNAL MEDICINE | Facility: CLINIC | Age: 74
End: 2024-03-11

## 2024-03-11 RX ORDER — CIPROFLOXACIN HYDROCHLORIDE 3.5 MG/ML
1 SOLUTION/ DROPS TOPICAL
Qty: 5 ML | Refills: 0 | Status: SHIPPED | OUTPATIENT
Start: 2024-03-11 | End: 2024-03-15

## 2024-03-11 NOTE — TELEPHONE ENCOUNTER
Caller: Debbie Cheng    Relationship: Self    Best call back number:     [phone]        Requested Prescriptions: PINKEYE MEDICATION  Requested Prescriptions      No prescriptions requested or ordered in this encounter        Pharmacy where request should be sent:    [facility]/pharmacy #[id number] - [place], [place] - [address] - [phone] [facility] [phone]  [phone]   Last office visit with prescribing clinician: 2/21/2024   Last telemedicine visit with prescribing clinician: Visit date not found   Next office visit with prescribing clinician: 8/26/2024     Additional details provided by patient: PATIENT IS CALLING TO STATE SHE JUST GOT OUT OF THE HOSPITAL ON SATURDAY.  SHE IS REQUESTING THAT MS MOORE LOOK AT THE SCOPE SHE HAD LAST WEEK.  SHE STATES SHE HAS QUESTIONS REGARDING MEDICATIONS.  SHE IS ALSO REQUESTING A MEDICATION FOR PINKEYE.    Does the patient have less than a 3 day supply:  [x] Yes  [] No    Would you like a call back once the refill request has been completed: [] Yes [] No    If the office needs to give you a call back, can they leave a voicemail: [] Yes [] No    Stuart Dickey Rep   03/11/24 08:14 EDT         DELETE AFTER READING TO PATIENT: “Thank you for sharing this information with me. I will send a message to the clinical team. Please allow 48 hours for the clinical staff to follow up on this request.”

## 2024-03-11 NOTE — OUTREACH NOTE
Call Center TCM Note      Flowsheet Row Responses   [facility] patient discharged from? [place]   Does the patient have one of the following disease processes/diagnoses(primary or secondary)? Other   TCM attempt successful? Yes   Call start time 1159   Call end time 1207   Discharge diagnosis Lung mass   Meds reviewed with patient/caregiver? Yes   Does the patient have all medications ordered at discharge? N/A   Comments Pt does want to set appts with specialcists and will then sched with PCP LOUIS Reddy.   Does the patient have an appointment with their PCP within 7-14 days of discharge? No   Nursing Interventions Patient desires to follow up with specialty only, Routed TCM call to PCP office   Has home health visited the patient within 72 hours of discharge? N/A   Psychosocial issues? No   Did the patient receive a copy of their discharge instructions? Yes   Nursing interventions Reviewed instructions with patient   What is the patient's perception of their health status since discharge? Improving   Is the patient/caregiver able to teach back signs and symptoms related to disease process for when to call PCP? Yes   Is the patient/caregiver able to teach back signs and symptoms related to disease process for when to call 911? Yes   Is the patient/caregiver able to teach back the hierarchy of who to call/visit for symptoms/problems? PCP, Specialist, Home health nurse, Urgent Care, ED, 911 Yes   If the patient is a current smoker, are they able to teach back resources for cessation? Not a smoker   TCM call completed? Yes   Wrap up additional comments D/C DX: lung mass,  bronchoscopy w/bx's - Pt doing ok, still expelling alot of phlegm. PULM HOSP FOLLOW UP appt pending. Pt does want to set appts with specialcists and will then sched with PCP LOUIS Reddy.   Call end time 1207            Citlali Umaña MA    3/11/2024, 12:29 EDT

## 2024-03-12 LAB
CYTO UR: NORMAL
LAB AP CASE REPORT: NORMAL
LAB AP NON-GYN SPECIMEN ADEQUACY: NORMAL
LAB AP SPECIAL STAINS: NORMAL
PATH REPORT.FINAL DX SPEC: NORMAL
PATH REPORT.GROSS SPEC: NORMAL

## 2024-03-14 ENCOUNTER — TRANSCRIBE ORDERS (OUTPATIENT)
Dept: CT IMAGING | Facility: HOSPITAL | Age: 74
End: 2024-03-14
Payer: MEDICARE

## 2024-03-14 DIAGNOSIS — J18.9 PNEUMONIA DUE TO INFECTIOUS ORGANISM, UNSPECIFIED LATERALITY, UNSPECIFIED PART OF LUNG: Primary | ICD-10-CM

## 2024-03-15 ENCOUNTER — OFFICE VISIT (OUTPATIENT)
Dept: INTERNAL MEDICINE | Facility: CLINIC | Age: 74
End: 2024-03-15
Payer: MEDICARE

## 2024-03-15 VITALS
HEIGHT: 61 IN | TEMPERATURE: 98 F | WEIGHT: 153.4 LBS | OXYGEN SATURATION: 97 % | DIASTOLIC BLOOD PRESSURE: 60 MMHG | HEART RATE: 67 BPM | SYSTOLIC BLOOD PRESSURE: 114 MMHG | BODY MASS INDEX: 28.96 KG/M2

## 2024-03-15 DIAGNOSIS — Z09 HOSPITAL DISCHARGE FOLLOW-UP: Primary | ICD-10-CM

## 2024-03-15 DIAGNOSIS — J14: ICD-10-CM

## 2024-03-15 LAB
FUNGUS WND CULT: NORMAL
FUNGUS WND CULT: NORMAL
MYCOBACTERIUM SPEC CULT: NORMAL
MYCOBACTERIUM SPEC CULT: NORMAL
NIGHT BLUE STAIN TISS: NORMAL
NIGHT BLUE STAIN TISS: NORMAL

## 2024-03-15 RX ORDER — LEVOFLOXACIN 750 MG/1
TABLET, FILM COATED ORAL
COMMUNITY
Start: 2024-03-13

## 2024-03-15 NOTE — PROGRESS NOTES
Transitional Care Follow Up Visit  Subjective     Debbie Cheng is a 73 y.o. female who presents for a transitional care management visit.    Within 48 business hours after discharge our office contacted her via telephone to coordinate her care and needs.      I reviewed and discussed the details of that call along with the discharge summary, hospital problems, inpatient lab results, inpatient diagnostic studies, and consultation reports with Debbie.     Current outpatient and discharge medications have been reconciled for the patient.  Reviewed by: LOUIS Coles          3/9/2024    10:17 AM   Date of TCM Phone Call   [facility]   Date of Admission 3/6/2024   Date of Discharge 3/9/2024   Discharge Disposition Home or Self Care     Risk for Readmission (LACE) Score: 8 (3/9/2024  6:00 AM)      History of Present Illness   Course During Hospital Stay:  She was admitted from 3-6-2024 to 3-9-2024 for bronchoscopy with endobronchial US with FNAs due to lung mass.     ED with Steve Dean MD (03/06/2024)   Admission (Discharged) with Sunil Hilario MD (03/06/2024)     Hospital Course notes:      Ms. Cheng is a 73 y.o. female with a history of PAF and hypertension presenting to [facility] with chest pain.  Workup in ED included a CT angiogram which showed right hilar mass with atelectasis and some right-sided pulmonary nodules.  She was transferred to [place] for pulmonary consultation and additional management.  She was not started any antibiotics as she does not have any overt pneumonia.  She was seen by pulmonology and bronchoscopy was performed yesterday with BAL and lymph node biopsies performed.  Cultures are pending of course but so far have shown no growth. Biopsies will not be available for a couple of days.  Her chest pain has resolved several days ago and she otherwise is stable for discharge.  She has been instructed to continue guaifenesin for  secretions which did help some overnight and she will use over-the-counter cough medicines.  She is aware the need for follow-up with pulmonology next week in their office and will contact them if she does not hear anything about an appointment by Monday.     These events were precipitated by URI, where she had been to urgent care and placed on a steroid pack.     PULMONARY - SCAN - EVAL/ [facility]/ 3-13-24 (03/13/2024) She saw pulmonary yesterday, and was placed on Levaquin 750mg for 7 days. She is still coughing, but no further bloody sputum. She is using her ventolin 3-4 tines a day.      The following portions of the patient's history were reviewed and updated as appropriate: allergies, current medications, past family history, past medical history, past social history, past surgical history, and problem list.    Review of Systems   Constitutional:  Positive for fatigue. Negative for fever.   HENT:  Positive for congestion.    Respiratory: Negative.     Cardiovascular: Negative.  Negative for chest pain, palpitations and leg swelling.   Gastrointestinal: Negative.    Endocrine: Negative.    Musculoskeletal: Negative.    Allergic/Immunologic: Negative.    All other systems reviewed and are negative.      Objective   Physical Exam  Vitals reviewed.   Constitutional:       Appearance: Normal appearance. She is obese. She is not ill-appearing.   HENT:      Head: Normocephalic.      Right Ear: Decreased hearing noted.      Left Ear: Decreased hearing noted.   Cardiovascular:      Rate and Rhythm: Normal rate and regular rhythm.      Pulses: Normal pulses.      Heart sounds: Normal heart sounds. No murmur heard.  Pulmonary:      Effort: Pulmonary effort is normal. No respiratory distress.      Breath sounds: Normal breath sounds. No stridor.   Musculoskeletal:      Right lower leg: No edema.      Left lower leg: No edema.   Skin:     General: Skin is warm and dry.   Neurological:      General: No focal deficit present.       Mental Status: She is alert and oriented to person, place, and time.   Psychiatric:         Mood and Affect: Mood normal.         Behavior: Behavior normal.         Thought Content: Thought content normal.         Assessment & Plan     Reviewed bronchoscopy results:     AFB Culture - Lavage, Lung, Right Middle Lobe (03/08/2024 14:28)   Fungus Culture - Lavage, Lung, Right Middle Lobe (03/08/2024 14:28)   Fine Needle Aspiration (03/08/2024 13:39)   Fungus Culture - Fine Needle Aspirate, Lung, R (03/08/2024 14:26)   Respiratory Culture - Fine Needle Aspirate, Lung, R (03/08/2024 14:26)     Diagnoses and all orders for this visit:    1. Hospital discharge follow-up (Primary)    2. Pneumonia of right middle lobe due to Haemophilus influenzae  Comments:  Complete Levaquin 750mg daily       Discussed pneumonia vaccine, will get Prevnar 20 updated after CT chest is cleared.     Follow up with pulmonary and get CT chest

## 2024-04-12 LAB
MYCOBACTERIUM SPEC CULT: NORMAL
MYCOBACTERIUM SPEC CULT: NORMAL
NIGHT BLUE STAIN TISS: NORMAL
NIGHT BLUE STAIN TISS: NORMAL

## 2024-04-17 ENCOUNTER — APPOINTMENT (OUTPATIENT)
Dept: BONE DENSITY | Facility: HOSPITAL | Age: 74
End: 2024-04-17
Payer: MEDICARE

## 2024-04-17 ENCOUNTER — HOSPITAL ENCOUNTER (OUTPATIENT)
Dept: CT IMAGING | Facility: HOSPITAL | Age: 74
Discharge: HOME | End: 2024-04-17
Payer: MEDICARE

## 2024-04-17 DIAGNOSIS — Z13.820 ENCOUNTER FOR OSTEOPOROSIS SCREENING IN ASYMPTOMATIC POSTMENOPAUSAL PATIENT: ICD-10-CM

## 2024-04-17 DIAGNOSIS — Z78.0 ENCOUNTER FOR OSTEOPOROSIS SCREENING IN ASYMPTOMATIC POSTMENOPAUSAL PATIENT: ICD-10-CM

## 2024-04-17 DIAGNOSIS — J18.9 PNEUMONIA DUE TO INFECTIOUS ORGANISM, UNSPECIFIED LATERALITY, UNSPECIFIED PART OF LUNG: ICD-10-CM

## 2024-04-17 PROCEDURE — 77080 DXA BONE DENSITY AXIAL: CPT

## 2024-04-17 PROCEDURE — 71250 CT THORAX DX C-: CPT

## 2024-04-25 ENCOUNTER — TELEPHONE (OUTPATIENT)
Dept: CARDIOLOGY | Facility: CLINIC | Age: 74
End: 2024-04-25

## 2024-04-25 ENCOUNTER — OFFICE VISIT (OUTPATIENT)
Dept: INTERNAL MEDICINE | Facility: CLINIC | Age: 74
End: 2024-04-25
Payer: MEDICARE

## 2024-04-25 VITALS
HEART RATE: 62 BPM | DIASTOLIC BLOOD PRESSURE: 64 MMHG | TEMPERATURE: 98 F | BODY MASS INDEX: 29.27 KG/M2 | SYSTOLIC BLOOD PRESSURE: 124 MMHG | OXYGEN SATURATION: 98 % | HEIGHT: 61 IN | WEIGHT: 155 LBS

## 2024-04-25 DIAGNOSIS — Z91.09 ENVIRONMENTAL ALLERGIES: Primary | ICD-10-CM

## 2024-04-25 DIAGNOSIS — R05.2 SUBACUTE COUGH: ICD-10-CM

## 2024-04-25 PROCEDURE — 3074F SYST BP LT 130 MM HG: CPT | Performed by: NURSE PRACTITIONER

## 2024-04-25 PROCEDURE — 3044F HG A1C LEVEL LT 7.0%: CPT | Performed by: NURSE PRACTITIONER

## 2024-04-25 PROCEDURE — 1159F MED LIST DOCD IN RCRD: CPT | Performed by: NURSE PRACTITIONER

## 2024-04-25 PROCEDURE — 3078F DIAST BP <80 MM HG: CPT | Performed by: NURSE PRACTITIONER

## 2024-04-25 PROCEDURE — 1160F RVW MEDS BY RX/DR IN RCRD: CPT | Performed by: NURSE PRACTITIONER

## 2024-04-25 PROCEDURE — 99213 OFFICE O/P EST LOW 20 MIN: CPT | Performed by: NURSE PRACTITIONER

## 2024-04-25 RX ORDER — BENZONATATE 100 MG/1
100 CAPSULE ORAL 3 TIMES DAILY PRN
Qty: 30 CAPSULE | Refills: 0 | Status: SHIPPED | OUTPATIENT
Start: 2024-04-25 | End: 2024-05-05

## 2024-04-25 NOTE — TELEPHONE ENCOUNTER
Pt called, she has a CT of the chest order by another provider.  The results mentioned coronary calcification.     She would like for you to review and advise if she needs a sooner appt in Dec.

## 2024-04-25 NOTE — PROGRESS NOTES
"Debbie Cheng is a 73 y.o. female presenting today for   Chief Complaint   Patient presents with    Nasal Congestion    Cough    Fatigue     Pt presents for an acute visit; her PCP is ABIOLA Reddy.    Subjective    URI   This is a new problem. Episode onset: 8 days ago. The problem has been gradually worsening. Associated symptoms include congestion, coughing (non-productive), rhinorrhea and a sore throat. Pertinent negatives include no diarrhea, vomiting or wheezing. She has tried inhaler use for the symptoms.   No known ill contacts. Onset of S&S seemed to coincide with a 3 hour nature walk. She had stopped Flonase and restarted only one week ago.     The following portions of the patient's history were reviewed and updated as appropriate: allergies, current medications, problem list, past medical history, past surgical history, family history, and social history.    Review of Systems   Constitutional:  Positive for chills and fatigue. Negative for fever.   HENT:  Positive for congestion, postnasal drip, rhinorrhea and sore throat.    Respiratory:  Positive for cough (non-productive). Negative for shortness of breath and wheezing.    Gastrointestinal:  Negative for diarrhea and vomiting.   Neurological:  Negative for dizziness and headache.         Objective    Vitals:    04/25/24 1109   BP: 124/64   BP Location: Left arm   Patient Position: Sitting   Cuff Size: Adult   Pulse: 62   Temp: 98 °F (36.7 °C)   TempSrc: Infrared   SpO2: 98%   Weight: 70.3 kg (155 lb)   Height: 154.9 cm (60.98\")     Body mass index is 29.3 kg/m².  Nursing notes and vitals reviewed.    Physical Exam  Constitutional:       General: She is not in acute distress.     Appearance: She is well-developed.   HENT:      Head: Normocephalic.      Right Ear: Hearing, tympanic membrane, ear canal and external ear normal.      Left Ear: Hearing, tympanic membrane, ear canal and external ear normal.      Nose: Mucosal edema and rhinorrhea present. " Rhinorrhea is clear.      Right Turbinates: Pale.      Left Turbinates: Pale.      Right Sinus: No maxillary sinus tenderness or frontal sinus tenderness.      Left Sinus: No maxillary sinus tenderness or frontal sinus tenderness.      Mouth/Throat:      Mouth: Mucous membranes are moist.      Pharynx: Oropharynx is clear. Uvula midline.   Neck:      Thyroid: No thyroid mass or thyromegaly.   Cardiovascular:      Rate and Rhythm: Regular rhythm.      Pulses: Normal pulses.      Heart sounds: S1 normal and S2 normal. No murmur heard.     No friction rub. No gallop.   Pulmonary:      Effort: Pulmonary effort is normal.      Breath sounds: Normal breath sounds. No wheezing, rhonchi or rales.   Musculoskeletal:      Cervical back: Neck supple.   Lymphadenopathy:      Cervical: No cervical adenopathy.   Neurological:      Mental Status: She is alert and oriented to person, place, and time.      Cranial Nerves: No cranial nerve deficit.      Sensory: No sensory deficit.   Psychiatric:         Attention and Perception: She is attentive.         Speech: Speech normal.         Behavior: Behavior normal.           Assessment and Plan    Diagnoses and all orders for this visit:    1. Environmental allergies (Primary)    2. Subacute cough  -     benzonatate (Tessalon Perles) 100 MG capsule; Take 1 capsule by mouth 3 (Three) Times a Day As Needed for Cough for up to 10 days.  Dispense: 30 capsule; Refill: 0      Continue current allergy regimen.  Add saline nasal rinses.      Medications, including side effects, were discussed with the patient. Patient verbalized understanding.  The plan of care was discussed. All questions were answered. Patient verbalized understanding.        Return if symptoms worsen or fail to improve.

## 2024-05-10 ENCOUNTER — HOSPITAL ENCOUNTER (OUTPATIENT)
Dept: CARDIOLOGY | Facility: HOSPITAL | Age: 74
Discharge: HOME | End: 2024-05-10

## 2024-05-10 ENCOUNTER — TELEPHONE (OUTPATIENT)
Dept: CARDIOLOGY | Facility: CLINIC | Age: 74
End: 2024-05-10
Payer: MEDICARE

## 2024-05-10 VITALS
BODY MASS INDEX: 29.27 KG/M2 | WEIGHT: 155 LBS | HEIGHT: 61 IN | SYSTOLIC BLOOD PRESSURE: 121 MMHG | DIASTOLIC BLOOD PRESSURE: 69 MMHG | HEART RATE: 63 BPM

## 2024-05-10 DIAGNOSIS — I48.0 PAROXYSMAL ATRIAL FIBRILLATION: ICD-10-CM

## 2024-05-10 DIAGNOSIS — E78.2 MIXED HYPERLIPIDEMIA: ICD-10-CM

## 2024-05-10 DIAGNOSIS — E11.9 CONTROLLED TYPE 2 DIABETES MELLITUS WITHOUT COMPLICATION, WITHOUT LONG-TERM CURRENT USE OF INSULIN: ICD-10-CM

## 2024-05-10 DIAGNOSIS — I10 PRIMARY HYPERTENSION: ICD-10-CM

## 2024-05-10 LAB
BH CV ECHO MEAS - DIST AO DIAM: 1.52 CM
BH CV VAS BP LEFT ARM: NORMAL MMHG
BH CV VAS BP RIGHT ARM: NORMAL MMHG
BH CV VAS SCREENING CAROTID CCA LEFT: NORMAL CM/SEC
BH CV VAS SCREENING CAROTID CCA RIGHT: NORMAL CM/SEC
BH CV VAS SCREENING CAROTID ICA LEFT: NORMAL CM/SEC
BH CV VAS SCREENING CAROTID ICA RIGHT: NORMAL CM/SEC
BH CV XLRA MEAS - MID AO DIAM: 1.76 CM
BH CV XLRA MEAS - PAD LEFT ABI DP: 1.29
BH CV XLRA MEAS - PAD LEFT ABI PT: 1.33
BH CV XLRA MEAS - PAD LEFT ARM: 121 MMHG
BH CV XLRA MEAS - PAD LEFT LEG DP: 157 MMHG
BH CV XLRA MEAS - PAD LEFT LEG PT: 161 MMHG
BH CV XLRA MEAS - PAD RIGHT ABI DP: 1.29
BH CV XLRA MEAS - PAD RIGHT ABI PT: 1.31
BH CV XLRA MEAS - PAD RIGHT ARM: 120 MMHG
BH CV XLRA MEAS - PAD RIGHT LEG DP: 157 MMHG
BH CV XLRA MEAS - PAD RIGHT LEG PT: 159 MMHG
BH CV XLRA MEAS - PROX AO DIAM: 1.95 CM
BH CV XLRA MEAS LEFT ICA/CCA RATIO: 1.38
BH CV XLRA MEAS LEFT PROX ECA PSV: NORMAL CM/SEC
BH CV XLRA MEAS RIGHT ICA/CCA RATIO: 0.95
BH CV XLRA MEAS RIGHT PROX ECA PSV: NORMAL CM/SEC
MAXIMAL PREDICTED HEART RATE: 146 BPM
STRESS TARGET HR: 124 BPM

## 2024-05-10 PROCEDURE — 93799 UNLISTED CV SVC/PROCEDURE: CPT

## 2024-07-26 RX ORDER — ROSUVASTATIN CALCIUM 20 MG/1
TABLET, COATED ORAL
Qty: 90 TABLET | Refills: 1 | Status: SHIPPED | OUTPATIENT
Start: 2024-07-26

## 2024-08-13 DIAGNOSIS — I10 PRIMARY HYPERTENSION: ICD-10-CM

## 2024-08-13 DIAGNOSIS — E78.2 MIXED HYPERLIPIDEMIA: ICD-10-CM

## 2024-08-13 DIAGNOSIS — E11.9 CONTROLLED TYPE 2 DIABETES MELLITUS WITHOUT COMPLICATION, WITHOUT LONG-TERM CURRENT USE OF INSULIN: ICD-10-CM

## 2024-08-14 DIAGNOSIS — E55.9 VITAMIN D DEFICIENCY: ICD-10-CM

## 2024-08-15 RX ORDER — ERGOCALCIFEROL 1.25 MG/1
CAPSULE ORAL
Qty: 12 CAPSULE | Refills: 3 | Status: SHIPPED | OUTPATIENT
Start: 2024-08-15

## 2024-08-20 LAB
ALBUMIN SERPL-MCNC: 4.4 G/DL (ref 3.5–5.2)
ALBUMIN/GLOB SERPL: 1.9 G/DL
ALP SERPL-CCNC: 62 U/L (ref 39–117)
ALT SERPL-CCNC: 18 U/L (ref 1–33)
AST SERPL-CCNC: 20 U/L (ref 1–32)
BASOPHILS # BLD AUTO: 0.01 10*3/MM3 (ref 0–0.2)
BASOPHILS NFR BLD AUTO: 0.2 % (ref 0–1.5)
BILIRUB SERPL-MCNC: 0.9 MG/DL (ref 0–1.2)
BUN SERPL-MCNC: 20 MG/DL (ref 8–23)
BUN/CREAT SERPL: 21.5 (ref 7–25)
CALCIUM SERPL-MCNC: 9.6 MG/DL (ref 8.6–10.5)
CHLORIDE SERPL-SCNC: 105 MMOL/L (ref 98–107)
CHOLEST SERPL-MCNC: 155 MG/DL (ref 0–200)
CHOLEST/HDLC SERPL: 2.28 {RATIO}
CO2 SERPL-SCNC: 26.4 MMOL/L (ref 22–29)
CREAT SERPL-MCNC: 0.93 MG/DL (ref 0.57–1)
EGFRCR SERPLBLD CKD-EPI 2021: 64.6 ML/MIN/1.73
EOSINOPHIL # BLD AUTO: 0.08 10*3/MM3 (ref 0–0.4)
EOSINOPHIL NFR BLD AUTO: 1.6 % (ref 0.3–6.2)
ERYTHROCYTE [DISTWIDTH] IN BLOOD BY AUTOMATED COUNT: 13.4 % (ref 12.3–15.4)
GLOBULIN SER CALC-MCNC: 2.3 GM/DL
GLUCOSE SERPL-MCNC: 105 MG/DL (ref 65–99)
HBA1C MFR BLD: 6.5 % (ref 4.8–5.6)
HCT VFR BLD AUTO: 40 % (ref 34–46.6)
HDLC SERPL-MCNC: 68 MG/DL (ref 40–60)
HGB BLD-MCNC: 13.1 G/DL (ref 12–15.9)
IMM GRANULOCYTES # BLD AUTO: 0.01 10*3/MM3 (ref 0–0.05)
IMM GRANULOCYTES NFR BLD AUTO: 0.2 % (ref 0–0.5)
LDLC SERPL CALC-MCNC: 72 MG/DL (ref 0–100)
LYMPHOCYTES # BLD AUTO: 1.51 10*3/MM3 (ref 0.7–3.1)
LYMPHOCYTES NFR BLD AUTO: 30.1 % (ref 19.6–45.3)
MCH RBC QN AUTO: 31.1 PG (ref 26.6–33)
MCHC RBC AUTO-ENTMCNC: 32.8 G/DL (ref 31.5–35.7)
MCV RBC AUTO: 95 FL (ref 79–97)
MONOCYTES # BLD AUTO: 0.79 10*3/MM3 (ref 0.1–0.9)
MONOCYTES NFR BLD AUTO: 15.7 % (ref 5–12)
NEUTROPHILS # BLD AUTO: 2.62 10*3/MM3 (ref 1.7–7)
NEUTROPHILS NFR BLD AUTO: 52.2 % (ref 42.7–76)
NRBC BLD AUTO-RTO: 0 /100 WBC (ref 0–0.2)
PLATELET # BLD AUTO: 228 10*3/MM3 (ref 140–450)
POTASSIUM SERPL-SCNC: 4.2 MMOL/L (ref 3.5–5.2)
PROT SERPL-MCNC: 6.7 G/DL (ref 6–8.5)
RBC # BLD AUTO: 4.21 10*6/MM3 (ref 3.77–5.28)
SODIUM SERPL-SCNC: 141 MMOL/L (ref 136–145)
TRIGL SERPL-MCNC: 79 MG/DL (ref 0–150)
VLDLC SERPL CALC-MCNC: 15 MG/DL (ref 5–40)
WBC # BLD AUTO: 5.02 10*3/MM3 (ref 3.4–10.8)

## 2024-08-26 ENCOUNTER — OFFICE VISIT (OUTPATIENT)
Dept: INTERNAL MEDICINE | Facility: CLINIC | Age: 74
End: 2024-08-26
Payer: MEDICARE

## 2024-08-26 VITALS
TEMPERATURE: 97.8 F | WEIGHT: 158 LBS | OXYGEN SATURATION: 96 % | SYSTOLIC BLOOD PRESSURE: 130 MMHG | DIASTOLIC BLOOD PRESSURE: 70 MMHG | HEART RATE: 61 BPM | BODY MASS INDEX: 29.85 KG/M2

## 2024-08-26 DIAGNOSIS — E78.2 MIXED HYPERLIPIDEMIA: ICD-10-CM

## 2024-08-26 DIAGNOSIS — Z12.11 ENCOUNTER FOR SCREENING COLONOSCOPY: ICD-10-CM

## 2024-08-26 DIAGNOSIS — I10 ESSENTIAL HYPERTENSION: ICD-10-CM

## 2024-08-26 DIAGNOSIS — E11.9 CONTROLLED TYPE 2 DIABETES MELLITUS WITHOUT COMPLICATION, WITHOUT LONG-TERM CURRENT USE OF INSULIN: Primary | ICD-10-CM

## 2024-08-26 DIAGNOSIS — E55.9 VITAMIN D DEFICIENCY: ICD-10-CM

## 2024-08-26 DIAGNOSIS — I48.0 PAROXYSMAL ATRIAL FIBRILLATION: ICD-10-CM

## 2024-08-26 PROBLEM — J14: Status: RESOLVED | Noted: 2024-03-15 | Resolved: 2024-08-26

## 2024-08-26 PROBLEM — Z09 HOSPITAL DISCHARGE FOLLOW-UP: Status: RESOLVED | Noted: 2024-03-15 | Resolved: 2024-08-26

## 2024-08-26 PROCEDURE — 1126F AMNT PAIN NOTED NONE PRSNT: CPT | Performed by: NURSE PRACTITIONER

## 2024-08-26 PROCEDURE — 3044F HG A1C LEVEL LT 7.0%: CPT | Performed by: NURSE PRACTITIONER

## 2024-08-26 PROCEDURE — G2211 COMPLEX E/M VISIT ADD ON: HCPCS | Performed by: NURSE PRACTITIONER

## 2024-08-26 PROCEDURE — 99214 OFFICE O/P EST MOD 30 MIN: CPT | Performed by: NURSE PRACTITIONER

## 2024-08-26 PROCEDURE — 3078F DIAST BP <80 MM HG: CPT | Performed by: NURSE PRACTITIONER

## 2024-08-26 PROCEDURE — 3075F SYST BP GE 130 - 139MM HG: CPT | Performed by: NURSE PRACTITIONER

## 2024-08-26 RX ORDER — VALSARTAN AND HYDROCHLOROTHIAZIDE 320; 12.5 MG/1; MG/1
1 TABLET, FILM COATED ORAL DAILY
Qty: 90 TABLET | Refills: 1 | Status: SHIPPED | OUTPATIENT
Start: 2024-08-26

## 2024-08-26 NOTE — PROGRESS NOTES
Chief Complaint   Patient presents with    Diabetes     Lab result follow up         Subjective     Debbie Cheng is a 73 y.o. female being seen for a follow up appointment today regarding Type 2 DM, PAF, HTN, Hyperlipidemia, Vit D def, and IFG.      She is was on Metformin XR 500mg daily for DM2. She has been checking her glucose fasting and post prandial, running 100-117. She has reduced portions and reduced carbs. She has been exercising by walking daily and senior exercise class. She quit Atif chi due to knee pain She was having stool urgency form Metformin, so she stopped it. She has lost 12 pounds.      She is on Diovan /12.5 mg and Lopressor 25mg BID for HTN and rate control. She is tolerating it well. BP at home 120-130s/80s. She has been doing [employer], living well classes, and walking for exercise. She is followed by Dr. Mann for PAF. She is on Xarelto 20mg daily and lopressor 25mg 2 times daily      She is on Crestor 20mg nightly for cholesterol. (Previously treated with Zocor). She tolerates this well.      She has been having seasonal allergies. She is on Xyzal 5mg, Singulair 10mg and Flonase.     She has Vit D def, and takes a weekly supplement.     History of Present Illness     Allergies   Allergen Reactions    Penicillins Anaphylaxis and Swelling    Erythromycin Other (See Comments)     JOINT PAIN AND SWELLING    Astelin [Azelastine] Cough         Current Outpatient Medications:     fluticasone (FLONASE) 50 MCG/ACT nasal spray, 2 sprays into the nostril(s) as directed by provider Daily. Administer 2 sprays in each nostril for each dose., Disp: , Rfl:     glucose blood (OneTouch Ultra) test strip, USE TO CHECK BLOOD SUGAR DAILY. USE AS INSTRUCTED.(E11.65,KS), Disp: 100 each, Rfl: 3    glucose monitor monitoring kit, Use glucose meter to check fasting glucose once daily for diabetes monitoring. E11.9, Disp: 1 each, Rfl: 0    levocetirizine (XYZAL) 5 MG tablet, Take 1 tablet by mouth Every  Morning., Disp: , Rfl:     magnesium oxide (MAGOX) 400 (241.3 Mg) MG tablet tablet, Take 1 tablet by mouth Daily., Disp: , Rfl:     metoprolol tartrate (LOPRESSOR) 25 MG tablet, TAKE 1 TABLET BY MOUTH TWICE A DAY, Disp: 180 tablet, Rfl: 1    montelukast (SINGULAIR) 10 MG tablet, TAKE 1 TABLET BY MOUTH EVERY DAY AT NIGHT, Disp: 90 tablet, Rfl: 3    multivitamin with minerals tablet tablet, Take 1 tablet by mouth Daily., Disp: , Rfl:     OneTouch Delica Lancets 33G misc, USE ONE LANCET DAILY TO CHECK FASTING GLUCOSE FOR DIABETES MONITORING. E11.9, Disp: 100 each, Rfl: 12    rosuvastatin (CRESTOR) 20 MG tablet, TAKE 1 TABLET BY MOUTH EVERY DAY (STOP SIMVASTATIN), Disp: 90 tablet, Rfl: 1    valsartan-hydrochlorothiazide (DIOVAN-HCT) 320-12.5 MG per tablet, TAKE 1 TABLET BY MOUTH EVERY DAY (Patient taking differently: Take 1 tablet by mouth Daily.), Disp: 90 tablet, Rfl: 3    VENTOLIN  (90 Base) MCG/ACT inhaler, 2 puffs Every 4 (Four) Hours As Needed for Shortness of Air or Wheezing., Disp: , Rfl: 3    vitamin D (ERGOCALCIFEROL) 1.25 MG (15583 UT) capsule capsule, TAKE 1 CAPSULE BY MOUTH ONE TIME PER WEEK, Disp: 12 capsule, Rfl: 3    Xarelto 20 MG tablet, TAKE 1 TABLET BY MOUTH EVERY DAY (Patient taking differently: Take 1 tablet by mouth Daily With Dinner.), Disp: 90 tablet, Rfl: 3    The following portions of the patient's history were reviewed and updated as appropriate: allergies, current medications, past family history, past medical history, past social history, past surgical history, and problem list.    Review of Systems   Constitutional: Negative.    HENT: Negative.     Eyes: Negative.    Respiratory: Negative.     Cardiovascular: Negative.  Negative for chest pain, palpitations and leg swelling.   Endocrine: Negative.    Musculoskeletal:  Positive for arthralgias (knees).   Allergic/Immunologic: Negative.  Negative for environmental allergies.   Neurological: Negative.    Hematological: Negative.   Negative for adenopathy. Does not bruise/bleed easily.   Psychiatric/Behavioral: Negative.     All other systems reviewed and are negative.      Assessment     Physical Exam  Vitals reviewed.   Constitutional:       Appearance: Normal appearance. She is not ill-appearing.   HENT:      Head: Normocephalic.      Right Ear: Decreased hearing noted.      Left Ear: Decreased hearing noted.      Ears:      Comments: Wearing hearing aides  Cardiovascular:      Rate and Rhythm: Normal rate and regular rhythm.      Pulses: Normal pulses.      Heart sounds: Normal heart sounds.   Pulmonary:      Effort: Pulmonary effort is normal. No respiratory distress.      Breath sounds: Normal breath sounds. No stridor.   Musculoskeletal:      Cervical back: Neck supple.      Right lower leg: No edema.      Left lower leg: No edema.   Skin:     General: Skin is warm and dry.   Neurological:      General: No focal deficit present.      Mental Status: She is alert and oriented to person, place, and time.      Gait: Gait normal.   Psychiatric:         Mood and Affect: Mood normal.         Behavior: Behavior normal.         Thought Content: Thought content normal.         Plan     Her fasting labs were reviewed with the patient from last week.     Diagnoses and all orders for this visit:    1. Controlled type 2 diabetes mellitus without complication, without long-term current use of insulin (Primary)  Comments:  Chronic, diet controlled. Hgb A1c 6.5%  Orders:  -     CBC & Differential; Future  -     Comprehensive Metabolic Panel; Future  -     Lipid Panel With / Chol / HDL Ratio; Future  -     Hemoglobin A1c; Future    2. Essential hypertension  Comments:  Chronic, controlled on Lopressor 25mg BID and diovan HCTZ 320/12.5 mg daily  Orders:  -     valsartan-hydrochlorothiazide (DIOVAN-HCT) 320-12.5 MG per tablet; Take 1 tablet by mouth Daily.  Dispense: 90 tablet; Refill: 1  -     CBC & Differential; Future  -     Comprehensive Metabolic  Panel; Future  -     Lipid Panel With / Chol / HDL Ratio; Future    3. Mixed hyperlipidemia  Comments:  LDL goal < 70 on Crestor 20mg nightly  Orders:  -     Comprehensive Metabolic Panel; Future  -     Lipid Panel With / Chol / HDL Ratio; Future    4. Paroxysmal atrial fibrillation  Comments:  Rate controlled on opresor. On xarelto 20mg daily  Orders:  -     rivaroxaban (Xarelto) 20 MG tablet; Take 1 tablet by mouth Daily With Dinner.    5. Vitamin D deficiency  Comments:  Vit D weekly supplement  Orders:  -     Vitamin D,25-Hydroxy; Future    6. Encounter for screening colonoscopy  -     Ambulatory Referral to Gastroenterology      Follow up in 3 months w labs

## 2024-09-25 ENCOUNTER — TELEPHONE (OUTPATIENT)
Dept: GASTROENTEROLOGY | Facility: CLINIC | Age: 74
End: 2024-09-25
Payer: MEDICARE

## 2024-09-27 DIAGNOSIS — Z86.0100 PERSONAL HISTORY OF COLONIC POLYPS: Primary | ICD-10-CM

## 2024-09-30 PROBLEM — Z86.0100 PERSONAL HISTORY OF COLONIC POLYPS: Status: ACTIVE | Noted: 2024-09-27

## 2024-10-09 ENCOUNTER — OFFICE VISIT (OUTPATIENT)
Dept: INTERNAL MEDICINE | Facility: CLINIC | Age: 74
End: 2024-10-09
Payer: MEDICARE

## 2024-10-09 ENCOUNTER — HOSPITAL ENCOUNTER (EMERGENCY)
Facility: HOSPITAL | Age: 74
Discharge: HOME | End: 2024-10-09
Attending: EMERGENCY MEDICINE | Admitting: EMERGENCY MEDICINE
Payer: MEDICARE

## 2024-10-09 VITALS
TEMPERATURE: 98.6 F | HEART RATE: 51 BPM | OXYGEN SATURATION: 97 % | HEIGHT: 61 IN | RESPIRATION RATE: 16 BRPM | BODY MASS INDEX: 30.21 KG/M2 | SYSTOLIC BLOOD PRESSURE: 146 MMHG | DIASTOLIC BLOOD PRESSURE: 72 MMHG | WEIGHT: 160 LBS

## 2024-10-09 VITALS
HEART RATE: 58 BPM | BODY MASS INDEX: 30.4 KG/M2 | WEIGHT: 160.9 LBS | DIASTOLIC BLOOD PRESSURE: 70 MMHG | SYSTOLIC BLOOD PRESSURE: 130 MMHG | TEMPERATURE: 98.6 F | OXYGEN SATURATION: 96 %

## 2024-10-09 DIAGNOSIS — Z53.21 PATIENT LEFT WITHOUT BEING SEEN: Primary | ICD-10-CM

## 2024-10-09 DIAGNOSIS — M54.9 MUSCULOSKELETAL BACK PAIN: Primary | ICD-10-CM

## 2024-10-09 LAB
BILIRUB UR QL STRIP: NEGATIVE
CLARITY UR: CLEAR
COLOR UR: YELLOW
GLUCOSE UR STRIP-MCNC: NEGATIVE MG/DL
HGB UR QL STRIP.AUTO: NEGATIVE
KETONES UR QL STRIP: NEGATIVE
LEUKOCYTE ESTERASE UR QL STRIP.AUTO: NEGATIVE
NITRITE UR QL STRIP: NEGATIVE
PH UR STRIP.AUTO: 6.5 [PH] (ref 4.5–8)
PROT UR QL STRIP: NEGATIVE
SP GR UR STRIP: 1.02 (ref 1–1.03)
UROBILINOGEN UR QL STRIP: NORMAL

## 2024-10-09 PROCEDURE — 99283 EMERGENCY DEPT VISIT LOW MDM: CPT | Performed by: EMERGENCY MEDICINE

## 2024-10-09 PROCEDURE — 81003 URINALYSIS AUTO W/O SCOPE: CPT | Performed by: PHYSICIAN ASSISTANT

## 2024-10-09 RX ORDER — HYDROCODONE BITARTRATE AND ACETAMINOPHEN 5; 325 MG/1; MG/1
1 TABLET ORAL ONCE
Status: COMPLETED | OUTPATIENT
Start: 2024-10-09 | End: 2024-10-09

## 2024-10-09 RX ORDER — METHOCARBAMOL 500 MG/1
750 TABLET, FILM COATED ORAL ONCE
Status: COMPLETED | OUTPATIENT
Start: 2024-10-09 | End: 2024-10-09

## 2024-10-09 RX ORDER — METHOCARBAMOL 750 MG/1
750 TABLET, FILM COATED ORAL 3 TIMES DAILY
Qty: 30 TABLET | Refills: 0 | Status: SHIPPED | OUTPATIENT
Start: 2024-10-09 | End: 2024-10-19

## 2024-10-09 RX ORDER — LIDOCAINE 50 MG/G
1 PATCH TOPICAL EVERY 24 HOURS
Qty: 9 PATCH | Refills: 0 | Status: SHIPPED | OUTPATIENT
Start: 2024-10-09 | End: 2024-10-18

## 2024-10-09 RX ADMIN — METHOCARBAMOL 750 MG: 500 TABLET ORAL at 14:08

## 2024-10-09 RX ADMIN — HYDROCODONE BITARTRATE AND ACETAMINOPHEN 1 TABLET: 5; 325 TABLET ORAL at 14:08

## 2024-10-09 NOTE — DISCHARGE INSTRUCTIONS
I recommend calling your primary care doctor today for follow-up in the next few days.  Take your arthritic medication at home you can also take Robaxin as well as utilizing the lidocaine patches that have been prescribed for you today.  I recommend gentle movement throughout the day locking up of the back muscles.  Please return here if you develop a fever, more anterior or abdominal pain or any other worsening symptoms.

## 2024-10-09 NOTE — ED TRIAGE NOTES
Vacuumed out car yesterday around bed time started developing R sided back pain that has progressively worsened denies any urinary changes. Pain is up entire R side of back     Went to clinic states she waited for 1.5hrs and couldn't wait any longer

## 2024-10-09 NOTE — ED PROVIDER NOTES
EMERGENCY DEPARTMENT ENCOUNTER      Room Number: 03/03    History is provided by the patient, no translation services needed    HPI:    Chief complaint: back pain    Narrative: Pt is a 74 y.o. female who presents complaining of back pain.  Patient reports that she was cleaning her car yesterday and vacuuming.  She is also cleaning the house and then last night she developed acute right-sided back pain.  Reports it is an aching pain that is constant she was not able to find a comfortable position to sleep.  States that she took an arthritis pill that she thinks was Tylenol and it did not give her much relief.  She denies any urinary complaints no burning, dysuria, frequency or hematuria.  Denies any history of any pain in this area.  No nausea or vomiting.      PMD: Terra Reddy APRN    REVIEW OF SYSTEMS  Review of Systems  Complete review of systems performed otherwise negative except pertinent positives per HPI.    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 11/24/2020    Environmental allergies 04/01/2022    Onychia of toe of left foot 04/01/2022    PVCs (premature ventricular contractions) 05/24/2022    Premature atrial complexes 05/24/2022    FH: ovarian cancer 02/20/2023    Epigastric pain 03/06/2024    Lung mass 03/06/2024    Personal history of colonic polyps 09/27/2024     Resolved Ambulatory Problems     Diagnosis Date Noted     Subclinical hypothyroidism 2016    Abnormal weight gain 2016    Acute bacterial conjunctivitis of left eye 10/20/2016    Acute recurrent frontal sinusitis 10/20/2016    Sore throat 2018    Viral bronchitis 2018    Acute maxillary sinusitis 2020    COVID-19 virus infection 2022    Hospital discharge follow-up 03/15/2024    Pneumonia of right middle lobe due to Haemophilus influenzae 03/15/2024     Past Medical History:   Diagnosis Date    Allergic rhinitis     Arrhythmia     Asthma     Bronchitis     Cancer     Colon polyp     Coronary artery disease Afib    GERD (gastroesophageal reflux disease)     Heart murmur     HL (hearing loss)     Hypertension     PAF (paroxysmal atrial fibrillation)     Pneumonia     Rotator cuff tendonitis     Type 2 diabetes mellitus     UTI (urinary tract infection)        PAST SURGICAL HISTORY  Past Surgical History:   Procedure Laterality Date    BRONCHOSCOPY N/A 3/8/2024    Procedure: BRONCHOSCOPY WITH ENDOBRONCHIAL ULTRASOUND with FNA's, BAL;  Surgeon: Mauro Galloway MD;  Location: [facility] ENDOSCOPY;  Service: Pulmonary;  Laterality: N/A;  hilar lung mass    CARDIAC CATHETERIZATION      Cardiologist takes care of     SECTION      CHOLECYSTECTOMY      COLONOSCOPY      EYE SURGERY Bilateral     cataract surgery    GUM SURGERY  2017    HYSTERECTOMY      KNEE SURGERY         FAMILY HISTORY  Family History   Problem Relation Age of Onset    Stroke Mother     Hypertension Mother     Heart disease Father     Heart attack Father     Alcohol abuse Father         Ptsd    Hypertension Sister     Colon polyps Sister     Heart disease Brother     Cancer Paternal Grandmother             Breast cancer Paternal Grandmother     Heart disease Paternal Grandfather     Diabetes Paternal Grandfather     Kidney disease Sister         Jacks2       SOCIAL HISTORY  Social History     Socioeconomic History    Marital status:    Tobacco Use     Smoking status: Never     Passive exposure: Never    Smokeless tobacco: Never    Tobacco comments:     Father was a josé manuel smoker   Vaping Use    Vaping status: Never Used   Substance and Sexual Activity    Alcohol use: No    Drug use: No    Sexual activity: Yes     Partners: Male       ALLERGIES  Penicillins, Erythromycin, and Astelin [azelastine]    No current facility-administered medications for this encounter.    Current Outpatient Medications:     fluticasone (FLONASE) 50 MCG/ACT nasal spray, Administer 2 sprays into the nostril(s) as directed by provider Daily. Administer 2 sprays in each nostril for each dose., Disp: , Rfl:     glucose blood (OneTouch Ultra) test strip, USE TO CHECK BLOOD SUGAR DAILY. USE AS INSTRUCTED.(E11.65,KS), Disp: 100 each, Rfl: 3    glucose monitor monitoring kit, Use glucose meter to check fasting glucose once daily for diabetes monitoring. E11.9, Disp: 1 each, Rfl: 0    levocetirizine (XYZAL) 5 MG tablet, Take 1 tablet by mouth Every Morning., Disp: , Rfl:     lidocaine (LIDODERM) 5 %, Place 1 patch on the skin as directed by provider Daily for 9 days. Remove & Discard patch within 12 hours or as directed by MD, Disp: 9 patch, Rfl: 0    magnesium oxide (MAGOX) 400 (241.3 Mg) MG tablet tablet, Take 1 tablet by mouth Daily., Disp: , Rfl:     methocarbamol (ROBAXIN) 750 MG tablet, Take 1 tablet by mouth 3 (Three) Times a Day for 10 days., Disp: 30 tablet, Rfl: 0    metoprolol tartrate (LOPRESSOR) 25 MG tablet, TAKE 1 TABLET BY MOUTH TWICE A DAY, Disp: 180 tablet, Rfl: 1    montelukast (SINGULAIR) 10 MG tablet, TAKE 1 TABLET BY MOUTH EVERY DAY AT NIGHT, Disp: 90 tablet, Rfl: 3    multivitamin with minerals tablet tablet, Take 1 tablet by mouth Daily., Disp: , Rfl:     OneTouch Delica Lancets 33G misc, USE ONE LANCET DAILY TO CHECK FASTING GLUCOSE FOR DIABETES MONITORING. E11.9, Disp: 100 each, Rfl: 12    rivaroxaban (Xarelto) 20 MG tablet, Take 1 tablet by mouth Daily With Dinner.,  Disp: , Rfl:     rosuvastatin (CRESTOR) 20 MG tablet, TAKE 1 TABLET BY MOUTH EVERY DAY (STOP SIMVASTATIN), Disp: 90 tablet, Rfl: 1    valsartan-hydrochlorothiazide (DIOVAN-HCT) 320-12.5 MG per tablet, Take 1 tablet by mouth Daily., Disp: 90 tablet, Rfl: 1    VENTOLIN  (90 Base) MCG/ACT inhaler, 2 puffs Every 4 (Four) Hours As Needed for Shortness of Air or Wheezing., Disp: , Rfl: 3    vitamin D (ERGOCALCIFEROL) 1.25 MG (56796 UT) capsule capsule, TAKE 1 CAPSULE BY MOUTH ONE TIME PER WEEK, Disp: 12 capsule, Rfl: 3    PHYSICAL EXAM  ED Triage Vitals [10/09/24 1320]   Temp Heart Rate Resp BP SpO2   98.6 °F (37 °C) 60 16 (!) 190/86 100 %      Temp src Heart Rate Source Patient Position BP Location FiO2 (%)   Oral Monitor Sitting Left arm --       Physical Exam  Vitals and nursing note reviewed.   Constitutional:       Appearance: Normal appearance. She is normal weight. She is not ill-appearing or toxic-appearing.   HENT:      Head: Normocephalic and atraumatic.      Nose: Nose normal.      Mouth/Throat:      Mouth: Mucous membranes are moist.   Eyes:      Extraocular Movements: Extraocular movements intact.      Conjunctiva/sclera: Conjunctivae normal.      Pupils: Pupils are equal, round, and reactive to light.   Cardiovascular:      Rate and Rhythm: Normal rate and regular rhythm.   Pulmonary:      Effort: Pulmonary effort is normal.   Abdominal:      General: Abdomen is flat.      Palpations: Abdomen is soft.      Tenderness: There is no abdominal tenderness.   Musculoskeletal:         General: Tenderness (Tenderness along the paraspinal muscles of the thoracic and lumbar spine on the right side.  These do extend around the lower portion of the rib cage.  No external skin changes.  No bruising.) present. No swelling or deformity. Normal range of motion.      Cervical back: Normal range of motion. No tenderness.      Right lower leg: No edema.      Left lower leg: No edema.   Skin:     General: Skin is warm.       Capillary Refill: Capillary refill takes less than 2 seconds.      Coloration: Skin is not pale.   Neurological:      General: No focal deficit present.      Mental Status: She is alert and oriented to person, place, and time.      Motor: No weakness.   Psychiatric:         Mood and Affect: Mood normal.           LAB RESULTS  Lab Results (last 24 hours)       Procedure Component Value Units Date/Time    Urinalysis With Microscopic If Indicated (No Culture) - Urine, Clean Catch [[id number]]  (Normal) Collected: 10/09/24 1407    Specimen: Urine, Clean Catch Updated: 10/09/24 1415     Color, UA Yellow     Appearance, UA Clear     pH, UA 6.5     Specific Gravity, UA 1.020     Glucose, UA Negative     Ketones, UA Negative     Bilirubin, UA Negative     Blood, UA Negative     Protein, UA Negative     Leuk Esterase, UA Negative     Nitrite, UA Negative     Urobilinogen, UA 0.2 E.U./dL    Narrative:      Urine microscopic not indicated.              I ordered the above labs and reviewed the results    RADIOLOGY  No Radiology Exams Resulted Within Past 24 Hours    I ordered the above radiologic testing and reviewed the results    PROCEDURES  Procedures      PROGRESS AND CONSULTS           MEDICAL DECISION MAKING    MDM     Amount and/or Complexity of Data Reviewed  Clinical lab tests: reviewed    74-year-old female seen and evaluated for right sided back pain.  Patient reports the pain occurred approximately few hours after vacuuming her car and performing house chores which she states are not out of the norm for her but she was doing a lot of twisting.  Tylenol did not help her symptoms.  No trauma or falls reported.  No numbness or tingling of her lower extremities no weakness.  No bowel or bladder incontinence.  On exam patient does have some tightness of the paraspinal muscles of the right side of the thoracic and lumbar region.  No overlying skin changes.  This does extend slightly anteriorly mostly following the  lower portion of the rib cage.  This is all very tender with palpation but again no external signs of injury.  Do not think imaging is indicated at this time based on patient's symptoms she is experiencing and there are no abnormal neurologic findings.  Patient is able to ambulate independently.  Urine sample was obtained to ensure no findings for pyelonephritis and patient does not have any bacteria present in the urine, there is also no hematuria present.  Patient does take Xarelto for atrial fibrillation therefore NSAIDs are not indicated.  Patient already has Tylenol at home I will prescribe Robaxin and lidocaine patches for her.  She was encouraged to follow-up with her PCP and I also recommended massage therapy for the area.  Strict return precautions were given.  Patient was discharged home in stable condition.       DIAGNOSIS  Final diagnoses:   Musculoskeletal back pain       Latest Documented Vital Signs:  As of 15:12 EDT  BP- 146/72 HR- 51 Temp- 98.6 °F (37 °C) (Oral) O2 sat- 97%    DISPOSITION    Discussed pertinent findings with the patient/family.  Patient/Family voiced understanding of need to follow-up for recheck and further testing as needed.  Return to the Emergency Department warnings were given.         Medication List        New Prescriptions      lidocaine 5 %  Commonly known as: LIDODERM  Place 1 patch on the skin as directed by provider Daily for 9 days. Remove & Discard patch within 12 hours or as directed by MD     methocarbamol 750 MG tablet  Commonly known as: ROBAXIN  Take 1 tablet by mouth 3 (Three) Times a Day for 10 days.               Where to Get Your Medications        These medications were sent to [facility]/pharmacy #[id number] - [place], [place] - [address] - [phone]  - [phone] [facility] [facility] [address]      Phone: [phone]   lidocaine 5 %  methocarbamol 750 MG tablet              Follow-up Information       Terra Reddy APRN. Call today.     Specialties: Family Medicine, Emergency Medicine  Why: To schedule follow up appointment  Contact information:  [address]  [address]  [place] [id number] [phone]                               Dictated utilizing Dragon dictation     Suly Richardson PA-C  10/09/24 [id number]

## 2024-11-06 ENCOUNTER — TELEPHONE (OUTPATIENT)
Dept: GASTROENTEROLOGY | Facility: CLINIC | Age: 74
End: 2024-11-06
Payer: MEDICARE

## 2024-11-06 NOTE — TELEPHONE ENCOUNTER
Our patient is scheduled for a colonoscopy on 11-12-24. Patient is taking Xarelto, could you please provide guidance on the management of these medications before the procedure?   Specifically, should these medications be held prior to the procedure, and if so, for how long?     Best regards, Vanessa

## 2024-11-06 NOTE — TELEPHONE ENCOUNTER
Provider: DR LINDER    Caller: JENNIFER CHRISTINE    Relationship to Patient: SELF    Pharmacy:     Phone Number: [phone]     Reason for Call: PT IS IN NEED OF CLS PREP INSTRUCTIONS ADDED TO MYCHART, PT GOING TO PHARMACY TOMORROW.    PT IS ON MEDICATION XARELTO

## 2024-11-07 ENCOUNTER — TRANSCRIBE ORDERS (OUTPATIENT)
Dept: ADMINISTRATIVE | Facility: HOSPITAL | Age: 74
End: 2024-11-07
Payer: MEDICARE

## 2024-11-07 ENCOUNTER — TELEPHONE (OUTPATIENT)
Dept: CARDIOLOGY | Facility: CLINIC | Age: 74
End: 2024-11-07
Payer: MEDICARE

## 2024-11-07 DIAGNOSIS — Z12.31 SCREENING MAMMOGRAM, ENCOUNTER FOR: Primary | ICD-10-CM

## 2024-11-07 NOTE — TELEPHONE ENCOUNTER
Patient is having a colonoscopy on 11/12 with Dr. Mccarthy in [place]. Patient needs directions on holding her Xarelto and also if she can take her metoprolol the morning of the procedure. Please call patient as soon as possible to advise. Thank  you.

## 2024-11-08 NOTE — TELEPHONE ENCOUNTER
Reviewed previous encounter from Dr. Eubanks office.  Pt can hold Xarelto 2 days prior to procedure.  I called and informed pt.

## 2024-11-12 ENCOUNTER — ANESTHESIA (OUTPATIENT)
Dept: SURGERY | Facility: SURGERY CENTER | Age: 74
End: 2024-11-12
Payer: MEDICARE

## 2024-11-12 ENCOUNTER — HOSPITAL ENCOUNTER (OUTPATIENT)
Facility: SURGERY CENTER | Age: 74
Setting detail: HOSPITAL OUTPATIENT SURGERY
Discharge: HOME | End: 2024-11-12
Attending: INTERNAL MEDICINE | Admitting: INTERNAL MEDICINE
Payer: MEDICARE

## 2024-11-12 ENCOUNTER — ANESTHESIA EVENT (OUTPATIENT)
Dept: SURGERY | Facility: SURGERY CENTER | Age: 74
End: 2024-11-12
Payer: MEDICARE

## 2024-11-12 VITALS
DIASTOLIC BLOOD PRESSURE: 70 MMHG | BODY MASS INDEX: 29.49 KG/M2 | HEART RATE: 50 BPM | WEIGHT: 156.2 LBS | OXYGEN SATURATION: 94 % | SYSTOLIC BLOOD PRESSURE: 132 MMHG | RESPIRATION RATE: 16 BRPM | HEIGHT: 61 IN | TEMPERATURE: 98.2 F

## 2024-11-12 DIAGNOSIS — Z86.0100 HISTORY OF COLONIC POLYPS: ICD-10-CM

## 2024-11-12 DIAGNOSIS — Z86.0100 PERSONAL HISTORY OF COLONIC POLYPS: ICD-10-CM

## 2024-11-12 PROCEDURE — 25010000002 LIDOCAINE PF 2% 2 % SOLUTION: Performed by: NURSE ANESTHETIST, CERTIFIED REGISTERED

## 2024-11-12 PROCEDURE — 25010000002 PROPOFOL 10 MG/ML EMULSION: Performed by: NURSE ANESTHETIST, CERTIFIED REGISTERED

## 2024-11-12 PROCEDURE — 25010000002 PROPOFOL 1000 MG/100ML EMULSION: Performed by: NURSE ANESTHETIST, CERTIFIED REGISTERED

## 2024-11-12 PROCEDURE — 88305 TISSUE EXAM BY PATHOLOGIST: CPT | Performed by: INTERNAL MEDICINE

## 2024-11-12 PROCEDURE — 45380 COLONOSCOPY AND BIOPSY: CPT | Performed by: INTERNAL MEDICINE

## 2024-11-12 RX ORDER — LIDOCAINE HYDROCHLORIDE 20 MG/ML
INJECTION, SOLUTION EPIDURAL; INFILTRATION; INTRACAUDAL; PERINEURAL AS NEEDED
Status: DISCONTINUED | OUTPATIENT
Start: 2024-11-12 | End: 2024-11-12 | Stop reason: SURG

## 2024-11-12 RX ORDER — PROPOFOL 10 MG/ML
INJECTION, EMULSION INTRAVENOUS AS NEEDED
Status: DISCONTINUED | OUTPATIENT
Start: 2024-11-12 | End: 2024-11-12 | Stop reason: SURG

## 2024-11-12 RX ADMIN — PROPOFOL 200 MCG/KG/MIN: 10 INJECTION, EMULSION INTRAVENOUS at 14:09

## 2024-11-12 RX ADMIN — LIDOCAINE HYDROCHLORIDE 60 MG: 20 INJECTION, SOLUTION EPIDURAL; INFILTRATION; INTRACAUDAL; PERINEURAL at 14:06

## 2024-11-12 RX ADMIN — PROPOFOL 40 MG: 10 INJECTION, EMULSION INTRAVENOUS at 14:09

## 2024-11-12 NOTE — ANESTHESIA PREPROCEDURE EVALUATION
Anesthesia Evaluation     Patient summary reviewed and Nursing notes reviewed   no history of anesthetic complications:   NPO Solid Status: > 8 hours  NPO Liquid Status: > 2 hours           Airway   Mallampati: II  TM distance: >3 FB  Neck ROM: full  Dental      Pulmonary    (+) asthma,  Cardiovascular     (+) hypertension, CAD, dysrhythmias Atrial Fib, hyperlipidemia      Neuro/Psych  GI/Hepatic/Renal/Endo    (+) GERD, diabetes mellitus    Musculoskeletal     Abdominal    Substance History      OB/GYN          Other                    Anesthesia Plan    ASA 3     MAC     intravenous induction     Anesthetic plan, risks, benefits, and alternatives have been provided, discussed and informed consent has been obtained with: patient.    CODE STATUS:

## 2024-11-12 NOTE — ANESTHESIA POSTPROCEDURE EVALUATION
Patient: Debbie Cheng    Procedure Summary       Date: 11/12/24 Room / Location: [facility]  / [facility]    Anesthesia Start: 1402 Anesthesia Stop: 1434    Procedure: COLONOSCOPY to Cecum with Polypectomy Diagnosis:       Personal history of colonic polyps      Diverticulosis      Colon polyp      (Personal history of colonic polyps [Z86.010])    Surgeons: Wisam Mccarthy MD Provider: All Linton MD    Anesthesia Type: MAC ASA Status: 3            Anesthesia Type: MAC    Vitals  Vitals Value Taken Time   /70 11/12/24 1446   Temp 36.8 °C (98.2 °F) 11/12/24 1431   Pulse 50 11/12/24 1446   Resp 16 11/12/24 1446   SpO2 94 % 11/12/24 1446           Post Anesthesia Care and Evaluation    Patient location during evaluation: bedside  Patient participation: complete - patient participated  Level of consciousness: awake and alert  Pain management: adequate    Airway patency: patent  Anesthetic complications: No anesthetic complications  PONV Status: controlled  Cardiovascular status: blood pressure returned to baseline and acceptable  Respiratory status: acceptable  Hydration status: acceptable

## 2024-11-12 NOTE — BRIEF OP NOTE
COLONOSCOPY  Progress Note    Debbie Cheng  11/12/2024    Pre-op Diagnosis:   Personal history of colonic polyps [Z86.010]       Post-Op Diagnosis Codes:     * Personal history of colonic polyps [Z86.0100]     * Diverticulosis [K57.90]     * Colon polyp [K63.5]    Procedure/CPT® Codes:        Procedure(s):  COLONOSCOPY to Cecum with Polypectomy              Surgeon(s):  Wisam Mccarthy MD    Anesthesia: Monitored Anesthesia Care    Staff:   Endo Technician: Bita Onofre  Endo Nurse: Majo Pino RN         Estimated Blood Loss: none    Urine Voided: * No values recorded between 11/12/2024  2:02 PM and 11/12/2024  2:31 PM *    Specimens:                Specimens       ID Source Type Tests Collected By Collected At Frozen?    A Large Intestine, Left / Descending Colon Tissue TISSUE PATHOLOGY EXAM   Wisam Mccarthy MD 11/12/24 1423 No    Description: Descending Colon Polyp                  Drains: * No LDAs found *    Findings: Colon to TI good Prep  Sigmoid Diverticulosis  Polyp-Biopsy         Complications: None          Wisam Mccarthy MD     Date: 11/12/2024  Time: 14:31 EST

## 2024-11-12 NOTE — H&P
Patient Care Team:  Terra Reddy APRN as PCP - General (Family Medicine)  Trace Onofre MD as Consulting Physician (Ophthalmology)  Keon Mann MD as Consulting Physician (Cardiology)  Parrish Oliver MD as Consulting Physician (Orthopedic Surgery)  Emily Aranda MD as Consulting Physician (Dermatology)    CHIEF COMPLAINT: Personal hx colon polyps    HISTORY OF PRESENT ILLNESS:  Last exam was     Past Medical History:   Diagnosis Date    Acute bacterial conjunctivitis of left eye 10/20/2016    Allergic rhinitis     Arrhythmia     Asthma     Bronchitis     Cancer     Basel Cell and Squamous cell    Colon polyp     Coronary artery disease Afib    GERD (gastroesophageal reflux disease)     Heart murmur     HL (hearing loss)     Hyperlipidemia     Hypertension     PAF (paroxysmal atrial fibrillation)     one episode, 2015; was briefly treated with Xarelto    Pneumonia     Premature atrial complexes 2022    PVCs (premature ventricular contractions) 2022    Rotator cuff tendonitis     Type 2 diabetes mellitus     UTI (urinary tract infection)     Vitamin D deficiency      Past Surgical History:   Procedure Laterality Date    BRONCHOSCOPY N/A 3/8/2024    Procedure: BRONCHOSCOPY WITH ENDOBRONCHIAL ULTRASOUND with FNA's, BAL;  Surgeon: Mauro Galloway MD;  Location: [facility] ENDOSCOPY;  Service: Pulmonary;  Laterality: N/A;  hilar lung mass    CARDIAC CATHETERIZATION      Cardiologist takes care of     SECTION      CHOLECYSTECTOMY      COLONOSCOPY      EYE SURGERY Bilateral     cataract surgery    GUM SURGERY  2017    HYSTERECTOMY      KNEE SURGERY       Family History   Problem Relation Age of Onset    Stroke Mother     Hypertension Mother     Heart disease Father     Heart attack Father     Alcohol abuse Father         Ptsd    Hypertension Sister     Colon polyps Sister     Heart disease Brother     Cancer Paternal Grandmother             Breast cancer  Paternal Grandmother     Heart disease Paternal Grandfather     Diabetes Paternal Grandfather     Kidney disease Sister         Mayra     Social History     Tobacco Use    Smoking status: Never     Passive exposure: Never    Smokeless tobacco: Never    Tobacco comments:     Father was a gerj smoker   Vaping Use    Vaping status: Never Used   Substance Use Topics    Alcohol use: No    Drug use: No     Medications Prior to Admission   Medication Sig Dispense Refill Last Dose/Taking    fluticasone (FLONASE) 50 MCG/ACT nasal spray Administer 2 sprays into the nostril(s) as directed by provider Daily. Administer 2 sprays in each nostril for each dose.       glucose blood (OneTouch Ultra) test strip USE TO CHECK BLOOD SUGAR DAILY. USE AS INSTRUCTED.(E11.65,KS) 100 each 3     glucose monitor monitoring kit Use glucose meter to check fasting glucose once daily for diabetes monitoring. E11.9 1 each 0     levocetirizine (XYZAL) 5 MG tablet Take 1 tablet by mouth Every Morning.       magnesium oxide (MAGOX) 400 (241.3 Mg) MG tablet tablet Take 1 tablet by mouth Daily.       metoprolol tartrate (LOPRESSOR) 25 MG tablet TAKE 1 TABLET BY MOUTH TWICE A  tablet 1     montelukast (SINGULAIR) 10 MG tablet TAKE 1 TABLET BY MOUTH EVERY DAY AT NIGHT 90 tablet 3     multivitamin with minerals tablet tablet Take 1 tablet by mouth Daily.       OneTouch Delica Lancets 33G misc USE ONE LANCET DAILY TO CHECK FASTING GLUCOSE FOR DIABETES MONITORING. E11.9 100 each 12     rivaroxaban (Xarelto) 20 MG tablet Take 1 tablet by mouth Daily With Dinner.       rosuvastatin (CRESTOR) 20 MG tablet TAKE 1 TABLET BY MOUTH EVERY DAY (STOP SIMVASTATIN) 90 tablet 1     valsartan-hydrochlorothiazide (DIOVAN-HCT) 320-12.5 MG per tablet Take 1 tablet by mouth Daily. 90 tablet 1     VENTOLIN  (90 Base) MCG/ACT inhaler 2 puffs Every 4 (Four) Hours As Needed for Shortness of Air or Wheezing.  3     vitamin D (ERGOCALCIFEROL) 1.25 MG (58194 UT)  capsule capsule TAKE 1 CAPSULE BY MOUTH ONE TIME PER WEEK 12 capsule 3      Allergies:  Penicillins, Erythromycin, and Astelin [azelastine]    REVIEW OF SYSTEMS:  Please see the above history of present illness for pertinent positives and negatives.  The remainder of the patient's systems have been reviewed and are negative.     Vital Signs            Physical Exam:  Physical Exam   Constitutional: Patient appears well-developed and well-nourished and in no acute distress   HEENT:   Head: Normocephalic and atraumatic.   Eyes:  Pupils are equal, round, and reactive to light. EOM are intact. Sclerae are anicteric and non-injected.  Mouth and Throat: Patient has moist mucous membranes. Oropharynx is clear of any erythema or exudate.     Neck: Neck supple. No JVD present. No thyromegaly present. No lymphadenopathy present.  Cardiovascular: Regular rate, regular rhythm, S1 normal and S2 normal.  Exam reveals no gallop and no friction rub.  No murmur heard.  Pulmonary/Chest: Lungs are clear to auscultation bilaterally. No respiratory distress. No wheezes. No rhonchi. No rales.   Abdominal: Soft. Bowel sounds are normal. No distension and no mass. There is no hepatosplenomegaly. There is no tenderness.   Musculoskeletal: Normal Muscle tone  Extremities: No edema. Pulses are palpable in all 4 extremities.  Neurological: Patient is alert and oriented to person, place, and time. Cranial nerves II-XII are grossly intact with no focal deficits.  Skin: Skin is warm. No rash noted. Nails show no clubbing.  No cyanosis or erythema.    Debilities/Disabilities Identified: None  Emotional Behavior: Appropriate     Results Review:   I reviewed the patient's new clinical results.    Lab Results (most recent)       None            Imaging Results (Most Recent)       None          reviewed    ECG/EMG Results (most recent)       None          reviewed    Assessment & Plan   Personal hx colon polyps/  colonoscopy      I discussed the  patient's findings and my recommendations with patient.     Wisam Mccarthy MD  11/12/24  12:34 EST    Time: 10 min prior to procedure.

## 2024-11-13 ENCOUNTER — TELEPHONE (OUTPATIENT)
Dept: GASTROENTEROLOGY | Facility: CLINIC | Age: 74
End: 2024-11-13
Payer: MEDICARE

## 2024-11-13 LAB
CYTO UR: NORMAL
LAB AP CASE REPORT: NORMAL
PATH REPORT.FINAL DX SPEC: NORMAL
PATH REPORT.GROSS SPEC: NORMAL

## 2024-11-13 NOTE — TELEPHONE ENCOUNTER
rivaroxaban (Xarelto) 20 MG tablet [[id number]] (Order [id number])     SHE HAD HER PROCEDURE YESTERDAY WITH DR JENNINGS    SHE WOULD LIKE TO KNOW IF ITS OK FOR HER TO START BACK ON HER RX TODAY?    REQUESTS A CALL BACK

## 2024-11-13 NOTE — TELEPHONE ENCOUNTER
- One 3 mm polyp in the descending colon, removed with a cold biopsy forceps. Resected and retrieved    Will forward to provider for recommendations to resume      Rivaroxaban 20 mg Oral Daily With Dinner

## 2024-11-25 NOTE — PROGRESS NOTES
RM:________     PCP: Terra Reddy APRN    : 1950  AGE: 74 y.o.  EST PATIENT   REASON FOR VISIT/  CC:    Wt Readings from Last 3 Encounters:   24 70.9 kg (156 lb 3.2 oz)   10/09/24 72.6 kg (160 lb)   10/09/24 73 kg (160 lb 14.4 oz)      BP Readings from Last 3 Encounters:   24 132/70   10/09/24 146/72   10/09/24 130/70      WT: ____________ BP: __________L __________R HR______    ALLERGIES:Penicillins, Erythromycin, and Astelin [azelastine] SMOKING HISTORY:  Social History     Tobacco Use    Smoking status: Never     Passive exposure: Never    Smokeless tobacco: Never    Tobacco comments:     Father was a disco volante smoker   Vaping Use    Vaping status: Never Used   Substance Use Topics    Alcohol use: No    Drug use: No     CAFFEINE USE_________________  ALCOHOL ______________________    Below is the patient's most recent value for Albumin, ALT, AST, BUN, Calcium, Chloride, Cholesterol, CO2, Creatinine, GFR, Glucose, HDL, Hematocrit, Hemoglobin, Hemoglobin A1C, LDL, Magnesium, Phosphorus, Platelets, Potassium, PSA, Sodium, Triglycerides, TSH and WBC.   Lab Results   Component Value Date    ALBUMIN 4.4 2024    ALT 18 2024    AST 20 2024    BUN 20 2024    CALCIUM 9.6 2024     2024    CO2 26.4 2024    CREATININE 0.93 2024    HDL 68 (H) 2024    HCT 40.0 2024    HGB 13.1 2024    HGBA1C 6.50 (H) 2024    LDL 72 2024    MG 2.0 2022     2024    K 4.2 2024     2024    TRIG 79 2024    TSH 6.660 (H) 2017    WBC 5.02 2024          NEW DIAGNOSIS/ SURGERY/ HOSP OR ED VISITS: ______________________    __________________________________________________________________      RECENT LABS OR DIAGNOSTIC TESTING:  _____________________________    __________________________________________________________________      ASSESSMENT/ PLAN:  _______________________________________________    __________________________________________________________________

## 2024-12-03 ENCOUNTER — OFFICE VISIT (OUTPATIENT)
Dept: CARDIOLOGY | Facility: CLINIC | Age: 74
End: 2024-12-03
Payer: MEDICARE

## 2024-12-03 VITALS
BODY MASS INDEX: 29.64 KG/M2 | DIASTOLIC BLOOD PRESSURE: 80 MMHG | OXYGEN SATURATION: 95 % | WEIGHT: 157 LBS | HEIGHT: 61 IN | SYSTOLIC BLOOD PRESSURE: 130 MMHG | HEART RATE: 58 BPM

## 2024-12-03 DIAGNOSIS — I49.3 PVCS (PREMATURE VENTRICULAR CONTRACTIONS): ICD-10-CM

## 2024-12-03 DIAGNOSIS — I10 ESSENTIAL HYPERTENSION: ICD-10-CM

## 2024-12-03 DIAGNOSIS — I49.1 PREMATURE ATRIAL COMPLEXES: ICD-10-CM

## 2024-12-03 DIAGNOSIS — I48.0 PAROXYSMAL ATRIAL FIBRILLATION: Primary | ICD-10-CM

## 2024-12-03 PROCEDURE — 3079F DIAST BP 80-89 MM HG: CPT | Performed by: INTERNAL MEDICINE

## 2024-12-03 PROCEDURE — 3075F SYST BP GE 130 - 139MM HG: CPT | Performed by: INTERNAL MEDICINE

## 2024-12-03 PROCEDURE — 1159F MED LIST DOCD IN RCRD: CPT | Performed by: INTERNAL MEDICINE

## 2024-12-03 PROCEDURE — 99213 OFFICE O/P EST LOW 20 MIN: CPT | Performed by: INTERNAL MEDICINE

## 2024-12-03 PROCEDURE — 1160F RVW MEDS BY RX/DR IN RCRD: CPT | Performed by: INTERNAL MEDICINE

## 2024-12-03 NOTE — LETTER
December 3, 2024     LOUIS Coles  [address]  [address]  [place] [address]    Patient: Debbie Cheng   YOB: 1950   Date of Visit: 12/3/2024       Dear LOUIS Coles    Debbie Cheng was in my office today. Below is a copy of my note.    If you have questions, please do not hesitate to call me. I look forward to following Debbie along with you.         Sincerely,        Keon Mann MD        CC: No Recipients    RM:________     PCP: Terra Reddy APRN    : 1950  AGE: 74 y.o.  EST PATIENT   REASON FOR VISIT/  CC:    Wt Readings from Last 3 Encounters:   24 70.9 kg (156 lb 3.2 oz)   10/09/24 72.6 kg (160 lb)   10/09/24 73 kg (160 lb 14.4 oz)      BP Readings from Last 3 Encounters:   24 132/70   10/09/24 146/72   10/09/24 130/70      WT: ____________ BP: __________L __________R HR______    ALLERGIES:Penicillins, Erythromycin, and Astelin [azelastine] SMOKING HISTORY:  Social History     Tobacco Use   • Smoking status: Never     Passive exposure: Never   • Smokeless tobacco: Never   • Tobacco comments:     Father was a geavy smoker   Vaping Use   • Vaping status: Never Used   Substance Use Topics   • Alcohol use: No   • Drug use: No     CAFFEINE USE_________________  ALCOHOL ______________________    Below is the patient's most recent value for Albumin, ALT, AST, BUN, Calcium, Chloride, Cholesterol, CO2, Creatinine, GFR, Glucose, HDL, Hematocrit, Hemoglobin, Hemoglobin A1C, LDL, Magnesium, Phosphorus, Platelets, Potassium, PSA, Sodium, Triglycerides, TSH and WBC.   Lab Results   Component Value Date    ALBUMIN 4.4 2024    ALT 18 2024    AST 20 2024    BUN 2024    CALCIUM 9.6 2024     2024    CO2 26.4 2024    CREATININE 0.93 2024    HDL 68 (H) 2024    HCT 40.0 2024    HGB 13.1 2024    HGBA1C 6.50 (H) 2024    LDL 72 2024    MG 2.0 2022     2024    K 4.2 2024     " 2024    TRIG 79 2024    TSH 6.660 (H) 2017    WBC 5.02 2024          NEW DIAGNOSIS/ SURGERY/ HOSP OR ED VISITS: ______________________    __________________________________________________________________      RECENT LABS OR DIAGNOSTIC TESTING:  _____________________________    __________________________________________________________________      ASSESSMENT/ PLAN: _______________________________________________    __________________________________________________________________    Date of Office Visit: 2024  Encounter Provider: Keon Mann MD  Place of Service: [facility] CARDIOLOGY  Patient Name: Debbie Cheng  :1950    Chief Complaint   Patient presents with   • Follow-up   :     HPI: Debbie Cheng is a 74 y.o. female who presents today in follow-up. I have reviewed prior notes and there are no changes except for any new updates described below. I have also reviewed any information entered into the medical record by the patient or by ancillary staff.     She is an extremely pleasant lady who had one episode of highly symptomatic atrial fibrillation in May 2015. She converted on her own. She was started on rivaroxaban and metoprolol. In 2015, I stopped the NOAC when it was clear that she had experienced no recurrence.     In 2021, she reported palpitations that sounded like premature ectopics; a Holter showed a 4% burden of of PACs and a 4% burden of PVCs. An echo showed normal LV size and systolic function, mild-moderate LAE, and mild-moderate MR.     In , she reported an increased frequency of palpitations; they were not consistent with her previous PAF. She felt skips and a pause. She went to the ED, and was noted to have PVCs. She was reassured; her symptoms have improved. She was told that her \"heart was enlarged.\" I reviewed her CXR and did not appreciate significant cardiomegalky.     She has not chest pain or " dyspnea. She has no pedal edema or orthopnea. She has had no syncope. Her palpitations are stable and mild.     Past Medical History:   Diagnosis Date   • Acute bacterial conjunctivitis of left eye 10/20/2016   • Allergic rhinitis    • Asthma    • Basal cell carcinoma of skin    • Bronchitis    • Colon polyp    • GERD (gastroesophageal reflux disease)    • HL (hearing loss)    • Hyperlipidemia    • Hypertension    • PAF (paroxysmal atrial fibrillation)     one episode, 2015; was briefly treated with Xarelto   • Pneumonia    • Premature atrial complexes 2022   • PVCs (premature ventricular contractions) 2022   • Rotator cuff tendonitis    • Squamous cell skin cancer    • Type 2 diabetes mellitus    • UTI (urinary tract infection)    • Vitamin D deficiency        Past Surgical History:   Procedure Laterality Date   • BRONCHOSCOPY N/A 3/8/2024    Procedure: BRONCHOSCOPY WITH ENDOBRONCHIAL ULTRASOUND with FNA's, BAL;  Surgeon: Mauro Galloway MD;  Location: [facility] ENDOSCOPY;  Service: Pulmonary;  Laterality: N/A;  hilar lung mass   • CARDIAC CATHETERIZATION      Cardiologist takes care of   •  SECTION     • CHOLECYSTECTOMY     • COLONOSCOPY     • COLONOSCOPY N/A 2024    Procedure: COLONOSCOPY to Cecum with Polypectomy;  Surgeon: Wisam Mccarthy MD;  Location: [facility] MAIN OR;  Service: Gastroenterology;  Laterality: N/A;  Diverticulosis, Descending Colon Polyp   • EYE SURGERY Bilateral     cataract surgery   • GUM SURGERY  2017   • HYSTERECTOMY     • KNEE SURGERY         Social History     Socioeconomic History   • Marital status:    Tobacco Use   • Smoking status: Never     Passive exposure: Never   • Smokeless tobacco: Never   • Tobacco comments:     Father was a geavy smoker   Vaping Use   • Vaping status: Never Used   Substance and Sexual Activity   • Alcohol use: No   • Drug use: No   • Sexual activity: Yes     Partners: Male       Family History   Problem  Relation Age of Onset   • Stroke Mother    • Hypertension Mother    • Heart disease Father    • Heart attack Father    • Alcohol abuse Father         Ptsd   • Hypertension Sister    • Colon polyps Sister    • Heart disease Brother    • Cancer Paternal Grandmother            • Breast cancer Paternal Grandmother    • Heart disease Paternal Grandfather    • Diabetes Paternal Grandfather    • Kidney disease Sister         Jacks2       Review of Systems   Constitutional: Negative for malaise/fatigue.   Cardiovascular:  Positive for palpitations. Negative for chest pain.   Respiratory:  Negative for shortness of breath.    Musculoskeletal:  Positive for joint pain.   Neurological:  Negative for light-headedness.   All other systems reviewed and are negative.      Allergies   Allergen Reactions   • Penicillins Anaphylaxis and Swelling   • Erythromycin Other (See Comments)     JOINT PAIN AND SWELLING   • Astelin [Azelastine] Cough         Current Outpatient Medications:   •  fluticasone (FLONASE) 50 MCG/ACT nasal spray, Administer 2 sprays into the nostril(s) as directed by provider Daily. Administer 2 sprays in each nostril for each dose., Disp: , Rfl:   •  glucose blood (OneTouch Ultra) test strip, USE TO CHECK BLOOD SUGAR DAILY. USE AS INSTRUCTED.(E11.65,KS), Disp: 100 each, Rfl: 3  •  glucose monitor monitoring kit, Use glucose meter to check fasting glucose once daily for diabetes monitoring. E11.9, Disp: 1 each, Rfl: 0  •  levocetirizine (XYZAL) 5 MG tablet, Take 1 tablet by mouth Every Morning., Disp: , Rfl:   •  magnesium oxide (MAGOX) 400 (241.3 Mg) MG tablet tablet, Take 250 mg by mouth Every Other Day., Disp: , Rfl:   •  metoprolol tartrate (LOPRESSOR) 25 MG tablet, TAKE 1 TABLET BY MOUTH TWICE A DAY, Disp: 180 tablet, Rfl: 1  •  montelukast (SINGULAIR) 10 MG tablet, TAKE 1 TABLET BY MOUTH EVERY DAY AT NIGHT, Disp: 90 tablet, Rfl: 3  •  multivitamin with minerals tablet tablet, Take 1 tablet by mouth  "Daily., Disp: , Rfl:   •  OneTouch Delica Lancets 33G misc, USE ONE LANCET DAILY TO CHECK FASTING GLUCOSE FOR DIABETES MONITORING. E11.9, Disp: 100 each, Rfl: 12  •  rivaroxaban (Xarelto) 20 MG tablet, Take 1 tablet by mouth Daily With Dinner., Disp: , Rfl:   •  rosuvastatin (CRESTOR) 20 MG tablet, TAKE 1 TABLET BY MOUTH EVERY DAY (STOP SIMVASTATIN), Disp: 90 tablet, Rfl: 1  •  valsartan-hydrochlorothiazide (DIOVAN-HCT) 320-12.5 MG per tablet, Take 1 tablet by mouth Daily., Disp: 90 tablet, Rfl: 1  •  VENTOLIN  (90 Base) MCG/ACT inhaler, 2 puffs Every 4 (Four) Hours As Needed for Shortness of Air or Wheezing., Disp: , Rfl: 3  •  vitamin D (ERGOCALCIFEROL) 1.25 MG (71638 UT) capsule capsule, TAKE 1 CAPSULE BY MOUTH ONE TIME PER WEEK, Disp: 12 capsule, Rfl: 3     Objective:     Vitals:    12/03/24 1006   BP: 130/80   BP Location: Left arm   Patient Position: Sitting   Cuff Size: Adult   Pulse: 58   SpO2: 95%   Weight: 71.2 kg (157 lb)   Height: 154.9 cm (61\")       Body mass index is 29.66 kg/m².    Physical Exam  Vitals reviewed.   Constitutional:       Appearance: She is well-developed.   HENT:      Head: Normocephalic.      Nose: Nose normal.      Mouth/Throat:      Pharynx: Oropharynx is clear.   Eyes:      Conjunctiva/sclera: Conjunctivae normal.   Neck:      Vascular: No JVD.   Cardiovascular:      Rate and Rhythm: Normal rate and regular rhythm.      Pulses: Normal pulses and intact distal pulses.      Heart sounds: Normal heart sounds. No murmur heard.  Pulmonary:      Effort: Pulmonary effort is normal.      Breath sounds: Normal breath sounds.   Abdominal:      Palpations: Abdomen is soft.      Tenderness: There is no abdominal tenderness.   Musculoskeletal:         General: No swelling. Normal range of motion.      Cervical back: Normal range of motion.   Lymphadenopathy:      Cervical: No cervical adenopathy.   Skin:     General: Skin is warm and dry.      Findings: No rash.   Neurological:      " General: No focal deficit present.      Mental Status: She is alert and oriented to person, place, and time.      Cranial Nerves: No cranial nerve deficit.   Psychiatric:         Mood and Affect: Mood normal.         Behavior: Behavior normal.         Thought Content: Thought content normal.         Procedures      Assessment:       Diagnosis Plan   1. Paroxysmal atrial fibrillation        2. Premature atrial complexes        3. PVCs (premature ventricular contractions)        4. Essential hypertension             Plan:       1.  She had one highly symptomatic AF episode and has had no evidence of recurrence.  She will remain on metoprolol.  As her left atrium is dilated and her CHADSVASC is 3, I have recommended she remain on AC.     2/3.  A monitor in 2021 showed a 4% burden (H) of premature atrial and premature ventricular contractions.  Her palpitations are consistent with PVCs.  She can definitely tell the difference between these and atrial fibrillation.  If these were to happen again, she could take an extra dose of metoprolol or magnesium, and I encouraged her to get up and move around, as increasing her heart rate will often make them go away.  We talked about the generally benign nature of these.    4. Her BP is well controlled.     Sincerely,       Keon Mann MD

## 2024-12-20 ENCOUNTER — HOSPITAL ENCOUNTER (OUTPATIENT)
Dept: MAMMOGRAPHY | Facility: HOSPITAL | Age: 74
Discharge: HOME | End: 2024-12-20
Admitting: NURSE PRACTITIONER
Payer: MEDICARE

## 2024-12-20 DIAGNOSIS — Z12.31 SCREENING MAMMOGRAM, ENCOUNTER FOR: ICD-10-CM

## 2024-12-20 PROCEDURE — 77063 BREAST TOMOSYNTHESIS BI: CPT

## 2024-12-20 PROCEDURE — 77067 SCR MAMMO BI INCL CAD: CPT

## 2024-12-27 DIAGNOSIS — Z12.31 ENCOUNTER FOR SCREENING MAMMOGRAM FOR BREAST CANCER: Primary | ICD-10-CM

## 2025-01-06 DIAGNOSIS — I48.0 PAROXYSMAL ATRIAL FIBRILLATION: ICD-10-CM

## 2025-01-07 RX ORDER — RIVAROXABAN 20 MG/1
20 TABLET, FILM COATED ORAL DAILY
Qty: 90 TABLET | Refills: 1 | Status: SHIPPED | OUTPATIENT
Start: 2025-01-07

## 2025-02-05 DIAGNOSIS — E55.9 VITAMIN D DEFICIENCY: ICD-10-CM

## 2025-02-05 DIAGNOSIS — I10 ESSENTIAL HYPERTENSION: ICD-10-CM

## 2025-02-05 DIAGNOSIS — E11.9 CONTROLLED TYPE 2 DIABETES MELLITUS WITHOUT COMPLICATION, WITHOUT LONG-TERM CURRENT USE OF INSULIN: ICD-10-CM

## 2025-02-05 DIAGNOSIS — E78.2 MIXED HYPERLIPIDEMIA: ICD-10-CM

## 2025-02-05 RX ORDER — METOPROLOL TARTRATE 25 MG/1
TABLET, FILM COATED ORAL
Qty: 180 TABLET | Refills: 3 | Status: SHIPPED | OUTPATIENT
Start: 2025-02-05

## 2025-02-05 RX ORDER — ROSUVASTATIN CALCIUM 20 MG/1
TABLET, COATED ORAL
Qty: 90 TABLET | Refills: 1 | Status: SHIPPED | OUTPATIENT
Start: 2025-02-05

## 2025-02-05 NOTE — TELEPHONE ENCOUNTER
Rx Refill Note  Requested Prescriptions     Pending Prescriptions Disp Refills    rosuvastatin (CRESTOR) 20 MG tablet [Pharmacy Med Name: ROSUVASTATIN CALCIUM 20 MG TAB] 90 tablet 1     Sig: TAKE 1 TABLET BY MOUTH EVERY DAY (STOP SIMVASTATIN)      Last office visit with prescribing clinician: 8/26/2024   Last telemedicine visit with prescribing clinician: Visit date not found   Next office visit with prescribing clinician: 3/4/2025                         Would you like a call back once the refill request has been completed: [] Yes [] No    If the office needs to give you a call back, can they leave a voicemail: [] Yes [] No    Nadine Land MA  02/05/25, 15:18 EST

## 2025-02-19 ENCOUNTER — OFFICE VISIT (OUTPATIENT)
Dept: INTERNAL MEDICINE | Facility: CLINIC | Age: 75
End: 2025-02-19
Payer: MEDICARE

## 2025-02-19 VITALS
HEART RATE: 63 BPM | TEMPERATURE: 98.2 F | RESPIRATION RATE: 18 BRPM | BODY MASS INDEX: 30.78 KG/M2 | SYSTOLIC BLOOD PRESSURE: 122 MMHG | WEIGHT: 163 LBS | DIASTOLIC BLOOD PRESSURE: 68 MMHG | HEIGHT: 61 IN | OXYGEN SATURATION: 96 %

## 2025-02-19 DIAGNOSIS — R05.9 COUGH, UNSPECIFIED TYPE: Primary | ICD-10-CM

## 2025-02-19 DIAGNOSIS — R52 BODY ACHES: ICD-10-CM

## 2025-02-19 DIAGNOSIS — R09.81 NASAL CONGESTION: ICD-10-CM

## 2025-02-19 LAB
EXPIRATION DATE: NORMAL
EXPIRATION DATE: NORMAL
FLUAV AG NPH QL: NEGATIVE
FLUBV AG NPH QL: NEGATIVE
INTERNAL CONTROL: NORMAL
INTERNAL CONTROL: NORMAL
Lab: NORMAL
Lab: NORMAL
SARS-COV-2 AG UPPER RESP QL IA.RAPID: NOT DETECTED

## 2025-02-19 PROCEDURE — 1126F AMNT PAIN NOTED NONE PRSNT: CPT | Performed by: INTERNAL MEDICINE

## 2025-02-19 PROCEDURE — 87426 SARSCOV CORONAVIRUS AG IA: CPT | Performed by: INTERNAL MEDICINE

## 2025-02-19 PROCEDURE — 3074F SYST BP LT 130 MM HG: CPT | Performed by: INTERNAL MEDICINE

## 2025-02-19 PROCEDURE — 3078F DIAST BP <80 MM HG: CPT | Performed by: INTERNAL MEDICINE

## 2025-02-19 PROCEDURE — 99213 OFFICE O/P EST LOW 20 MIN: CPT | Performed by: INTERNAL MEDICINE

## 2025-02-19 PROCEDURE — 87804 INFLUENZA ASSAY W/OPTIC: CPT | Performed by: INTERNAL MEDICINE

## 2025-02-19 NOTE — PROGRESS NOTES
"      Debbie Cheng is a 74 y.o. female, who presents with a chief complaint of   Chief Complaint   Patient presents with    Nasal Congestion     Nasal drainage x 3 days - no meds taken (only on med list)    Cough     Clear mucus. Metallic taste with cough x 3 days - no meds taken (only on med list) - patient reports she had pneumonia last year - cough sounds \"croupy\"     Generalized Body Aches     Joint pain - only meds taken on med list           HPI   Pt is a 75 yo femal w/ hx T2DM, htn, and PAF who presents with 3 days of congestion, cough, and aches.  No fever.  She is nervous about getting pneumonia bc she got pneumonia last year.  She was up coughing a lot last night.  She feels a little better today.  She is not taking any meds for her symptoms.        The following portions of the patient's history were reviewed and updated as appropriate: allergies, current medications, past family history, past medical history, past social history, past surgical history and problem list.    Allergies: Penicillins, Erythromycin, and Astelin [azelastine]    Review of Systems   Constitutional: Negative.    HENT: Negative.     Eyes: Negative.    Respiratory: Negative.     Cardiovascular: Negative.    Gastrointestinal: Negative.    Endocrine: Negative.    Genitourinary: Negative.    Musculoskeletal: Negative.    Skin: Negative.    Allergic/Immunologic: Negative.    Neurological: Negative.    Hematological: Negative.    Psychiatric/Behavioral: Negative.     All other systems reviewed and are negative.            Wt Readings from Last 3 Encounters:   02/19/25 73.9 kg (163 lb)   12/03/24 71.2 kg (157 lb)   11/12/24 70.9 kg (156 lb 3.2 oz)     Temp Readings from Last 3 Encounters:   02/19/25 98.2 °F (36.8 °C) (Infrared)   11/12/24 98.2 °F (36.8 °C) (Infrared)   10/09/24 98.6 °F (37 °C) (Oral)     BP Readings from Last 3 Encounters:   02/19/25 122/68   12/03/24 130/80   11/12/24 132/70     Pulse Readings from Last 3 Encounters: "   02/19/25 63   12/03/24 58   11/12/24 50     Body mass index is 30.8 kg/m².  SpO2 Readings from Last 3 Encounters:   02/19/25 96%   12/03/24 95%   11/12/24 94%          Physical Exam  Vitals and nursing note reviewed.   Constitutional:       General: She is not in acute distress.     Appearance: She is well-developed.   HENT:      Head: Normocephalic and atraumatic.      Right Ear: Tympanic membrane and external ear normal.      Left Ear: Tympanic membrane and external ear normal.      Nose: Nose normal.   Eyes:      Conjunctiva/sclera: Conjunctivae normal.      Pupils: Pupils are equal, round, and reactive to light.   Cardiovascular:      Rate and Rhythm: Normal rate and regular rhythm.      Heart sounds: Normal heart sounds.   Pulmonary:      Effort: Pulmonary effort is normal. No respiratory distress.      Breath sounds: Normal breath sounds. No wheezing.   Musculoskeletal:         General: Normal range of motion.      Cervical back: Normal range of motion and neck supple.      Comments: Normal gait   Skin:     General: Skin is warm and dry.   Neurological:      Mental Status: She is alert and oriented to person, place, and time.   Psychiatric:         Behavior: Behavior normal.         Thought Content: Thought content normal.         Judgment: Judgment normal.         Results for orders placed or performed during the hospital encounter of 11/12/24   Tissue Pathology Exam    Collection Time: 11/12/24  2:23 PM    Specimen: Large Intestine, Left / Descending Colon; Tissue   Result Value Ref Range    Case Report       Surgical Pathology Report                         Case: [id number]                                  Authorizing Provider:  Wisam Mccarthy        Collected:           11/12/2024 02:23 PM                                 MD Payam                                                                   Ordering Location:     [facility]     Received:            11/12/2024 02:54 PM                "                  [facility] MAIN OR                                                     Pathologist:           Kasey Harrell MD                                                        Specimen:    Large Intestine, Left / Descending Colon, Descending Colon Polyp                           Final Diagnosis       1.  Descending colon, polypectomy:         A.  Hyperplastic polyp.      Gross Description       1. Large Intestine, Left / Descending Colon.  The specimen is received in formalin labeled with the patient's name and further designated 'Descending colon biopsy' is a small fragment of gray-tan tissue. The specimen is submitted for embedding as received.        Microscopic Description       Unless \"gross only\" is specified, the final diagnosis for each specimen is based on microscopic examination of tissue.       Result Review :                  Assessment and Plan    Diagnoses and all orders for this visit:    1. Cough, unspecified type (Primary)  -     POC Influenza A / B  -     POCT JESU SARS-CoV-2 Antigen ADELFO    2. Nasal congestion  -     POC Influenza A / B  -     POCT JESU SARS-CoV-2 Antigen ADELFO    3. Body aches  -     POC Influenza A / B  -     POCT JESU SARS-CoV-2 Antigen ADELFO          Tylenol/motrin prn.  Otc meds for congestion as needed.  Flonase, zyrtec, and mucinex are safe options to use with pt's current med list.  Make sure pt remains hydrated.  Discussed signs and symptoms of dehydration, respiratory distress, and when to seek care in Emergency Department.  F/u if sx not starting to improve in 2-3 days or sooner if worsening.                 Outpatient Medications Prior to Visit   Medication Sig Dispense Refill    fluticasone (FLONASE) 50 MCG/ACT nasal spray Administer 2 sprays into the nostril(s) as directed by provider Daily. Administer 2 sprays in each nostril for each dose.      glucose blood (OneTouch Ultra) test strip USE TO CHECK BLOOD SUGAR DAILY. USE AS INSTRUCTED.(E11.65,KS) " 100 each 3    glucose monitor monitoring kit Use glucose meter to check fasting glucose once daily for diabetes monitoring. E11.9 1 each 0    levocetirizine (XYZAL) 5 MG tablet Take 1 tablet by mouth Every Morning.      magnesium oxide (MAGOX) 400 (241.3 Mg) MG tablet tablet Take 250 mg by mouth Every Other Day.      metoprolol tartrate (LOPRESSOR) 25 MG tablet TAKE 1 TABLET BY MOUTH TWICE A  tablet 3    montelukast (SINGULAIR) 10 MG tablet TAKE 1 TABLET BY MOUTH EVERY DAY AT NIGHT 90 tablet 3    multivitamin with minerals tablet tablet Take 1 tablet by mouth Daily.      OneTouch Delica Lancets 33G misc USE ONE LANCET DAILY TO CHECK FASTING GLUCOSE FOR DIABETES MONITORING. E11.9 100 each 12    rivaroxaban (Xarelto) 20 MG tablet TAKE 1 TABLET BY MOUTH EVERY DAY 90 tablet 1    rosuvastatin (CRESTOR) 20 MG tablet TAKE 1 TABLET BY MOUTH EVERY DAY (STOP SIMVASTATIN) 90 tablet 1    valsartan-hydrochlorothiazide (DIOVAN-HCT) 320-12.5 MG per tablet Take 1 tablet by mouth Daily. 90 tablet 1    vitamin D (ERGOCALCIFEROL) 1.25 MG (51102 UT) capsule capsule TAKE 1 CAPSULE BY MOUTH ONE TIME PER WEEK 12 capsule 3    VENTOLIN  (90 Base) MCG/ACT inhaler 2 puffs Every 4 (Four) Hours As Needed for Shortness of Air or Wheezing. (Patient not taking: Reported on 2/19/2025)  3     No facility-administered medications prior to visit.     No orders of the defined types were placed in this encounter.    [unfilled]  There are no discontinued medications.      No follow-ups on file.    Patient was given instructions and counseling regarding her condition or for health maintenance advice. Please see specific information pulled into the AVS if appropriate.

## 2025-02-21 ENCOUNTER — OFFICE VISIT (OUTPATIENT)
Dept: INTERNAL MEDICINE | Facility: CLINIC | Age: 75
End: 2025-02-21
Payer: MEDICARE

## 2025-02-21 VITALS
BODY MASS INDEX: 30.42 KG/M2 | HEART RATE: 62 BPM | SYSTOLIC BLOOD PRESSURE: 104 MMHG | OXYGEN SATURATION: 95 % | DIASTOLIC BLOOD PRESSURE: 64 MMHG | WEIGHT: 161 LBS | TEMPERATURE: 98 F

## 2025-02-21 DIAGNOSIS — R19.7 DIARRHEA, UNSPECIFIED TYPE: ICD-10-CM

## 2025-02-21 DIAGNOSIS — R11.10 VOMITING, UNSPECIFIED VOMITING TYPE, UNSPECIFIED WHETHER NAUSEA PRESENT: Primary | ICD-10-CM

## 2025-02-21 LAB
EXPIRATION DATE: NORMAL
INTERNAL CONTROL: NORMAL
Lab: NORMAL
S PYO AG THROAT QL: NEGATIVE

## 2025-02-21 PROCEDURE — 99213 OFFICE O/P EST LOW 20 MIN: CPT | Performed by: INTERNAL MEDICINE

## 2025-02-21 PROCEDURE — 87880 STREP A ASSAY W/OPTIC: CPT | Performed by: INTERNAL MEDICINE

## 2025-02-21 PROCEDURE — 3078F DIAST BP <80 MM HG: CPT | Performed by: INTERNAL MEDICINE

## 2025-02-21 PROCEDURE — 3074F SYST BP LT 130 MM HG: CPT | Performed by: INTERNAL MEDICINE

## 2025-02-21 PROCEDURE — 1126F AMNT PAIN NOTED NONE PRSNT: CPT | Performed by: INTERNAL MEDICINE

## 2025-02-21 RX ORDER — ONDANSETRON 4 MG/1
4 TABLET, ORALLY DISINTEGRATING ORAL EVERY 8 HOURS PRN
Qty: 20 TABLET | Refills: 0 | Status: SHIPPED | OUTPATIENT
Start: 2025-02-21

## 2025-02-21 NOTE — PROGRESS NOTES
Debbie Cheng is a 74 y.o. female, who presents with a chief complaint of   Chief Complaint   Patient presents with    Sore Throat     Started yesterday and doesn't know if when she drank gatoraoide if it upset her stomach had a flu and covid test done 2/19    Vomiting    Diarrhea           Sore Throat   Associated symptoms include diarrhea and vomiting.   Vomiting   Associated symptoms include diarrhea.   Diarrhea   Associated symptoms include vomiting.      Pt has been sick for about 5 days.  She was tested for flu and covid which as negative.  She has now started having vomiting and diarrhea that started yesterday.  No fever, congestion, or soa.  No blood in diarrhea.  She was able to tolerate Gatorade yesterday.    The following portions of the patient's history were reviewed and updated as appropriate: allergies, current medications, past family history, past medical history, past social history, past surgical history and problem list.    Allergies: Penicillins, Erythromycin, and Astelin [azelastine]    Review of Systems   Constitutional: Negative.    HENT:  Positive for sore throat.    Eyes: Negative.    Respiratory: Negative.     Cardiovascular: Negative.    Gastrointestinal:  Positive for diarrhea and vomiting.   Endocrine: Negative.    Genitourinary: Negative.    Musculoskeletal: Negative.    Skin: Negative.    Allergic/Immunologic: Negative.    Neurological: Negative.    Hematological: Negative.    Psychiatric/Behavioral: Negative.     All other systems reviewed and are negative.            Wt Readings from Last 3 Encounters:   02/21/25 73 kg (161 lb)   02/19/25 73.9 kg (163 lb)   12/03/24 71.2 kg (157 lb)     Temp Readings from Last 3 Encounters:   02/21/25 98 °F (36.7 °C) (Infrared)   02/19/25 98.2 °F (36.8 °C) (Infrared)   11/12/24 98.2 °F (36.8 °C) (Infrared)     BP Readings from Last 3 Encounters:   02/21/25 104/64   02/19/25 122/68   12/03/24 130/80     Pulse Readings from Last 3 Encounters:    02/21/25 62   02/19/25 63   12/03/24 58     Body mass index is 30.42 kg/m².  SpO2 Readings from Last 3 Encounters:   02/21/25 95%   02/19/25 96%   12/03/24 95%          Physical Exam  Vitals and nursing note reviewed.   Constitutional:       General: She is not in acute distress.     Appearance: She is well-developed.   HENT:      Head: Normocephalic and atraumatic.      Right Ear: External ear normal.      Left Ear: External ear normal.      Nose: Nose normal.      Mouth/Throat:      Pharynx: Posterior oropharyngeal erythema present.   Eyes:      Conjunctiva/sclera: Conjunctivae normal.      Pupils: Pupils are equal, round, and reactive to light.   Cardiovascular:      Rate and Rhythm: Normal rate and regular rhythm.      Heart sounds: Normal heart sounds.   Pulmonary:      Effort: Pulmonary effort is normal. No respiratory distress.      Breath sounds: Normal breath sounds. No wheezing.   Musculoskeletal:         General: Normal range of motion.      Cervical back: Normal range of motion and neck supple.      Comments: Normal gait   Skin:     General: Skin is warm and dry.   Neurological:      General: No focal deficit present.      Mental Status: She is alert and oriented to person, place, and time.   Psychiatric:         Mood and Affect: Mood normal.         Behavior: Behavior normal.         Thought Content: Thought content normal.         Judgment: Judgment normal.         Results for orders placed or performed in visit on 02/19/25   POC Influenza A / B    Collection Time: 02/19/25 12:07 PM    Specimen: Swab   Result Value Ref Range    Rapid Influenza A Ag Negative Negative    Rapid Influenza B Ag Negative Negative    Internal Control Passed Passed    Lot Number [id number]     Expiration Date 10/11/2026    POCT JESU SARS-CoV-2 Antigen ADELFO    Collection Time: 02/19/25 12:11 PM    Specimen: Swab   Result Value Ref Range    SARS Antigen Not Detected Not Detected, Presumptive Negative    Internal Control Passed  Passed    Lot Number [id number]     Expiration Date 6/18/2025      Result Review :                  Assessment and Plan    Diagnoses and all orders for this visit:    1. Vomiting, unspecified vomiting type, unspecified whether nausea present (Primary)  -     ondansetron ODT (ZOFRAN-ODT) 4 MG disintegrating tablet; Take 1 tablet by mouth Every 8 (Eight) Hours As Needed for Nausea or Vomiting.  Dispense: 20 tablet; Refill: 0  -     POCT rapid strep A    2. Diarrhea, unspecified type     Push fluids.  Discussed signs/sx of dehydration and when to go to ED.     BMI is >= 30 and <35. (Class 1 Obesity). The following options were offered after discussion;: exercise counseling/recommendations and nutrition counseling/recommendations             Outpatient Medications Prior to Visit   Medication Sig Dispense Refill    fluticasone (FLONASE) 50 MCG/ACT nasal spray Administer 2 sprays into the nostril(s) as directed by provider Daily. Administer 2 sprays in each nostril for each dose.      glucose blood (OneTouch Ultra) test strip USE TO CHECK BLOOD SUGAR DAILY. USE AS INSTRUCTED.(E11.65,KS) 100 each 3    glucose monitor monitoring kit Use glucose meter to check fasting glucose once daily for diabetes monitoring. E11.9 1 each 0    levocetirizine (XYZAL) 5 MG tablet Take 1 tablet by mouth Every Morning.      magnesium oxide (MAGOX) 400 (241.3 Mg) MG tablet tablet Take 250 mg by mouth Every Other Day.      metoprolol tartrate (LOPRESSOR) 25 MG tablet TAKE 1 TABLET BY MOUTH TWICE A  tablet 3    montelukast (SINGULAIR) 10 MG tablet TAKE 1 TABLET BY MOUTH EVERY DAY AT NIGHT 90 tablet 3    multivitamin with minerals tablet tablet Take 1 tablet by mouth Daily.      OneTouch Delica Lancets 33G misc USE ONE LANCET DAILY TO CHECK FASTING GLUCOSE FOR DIABETES MONITORING. E11.9 100 each 12    rivaroxaban (Xarelto) 20 MG tablet TAKE 1 TABLET BY MOUTH EVERY DAY 90 tablet 1    rosuvastatin (CRESTOR) 20 MG tablet TAKE 1 TABLET BY MOUTH  EVERY DAY (STOP SIMVASTATIN) 90 tablet 1    valsartan-hydrochlorothiazide (DIOVAN-HCT) 320-12.5 MG per tablet Take 1 tablet by mouth Daily. 90 tablet 1    VENTOLIN  (90 Base) MCG/ACT inhaler 2 puffs Every 4 (Four) Hours As Needed for Shortness of Air or Wheezing.  3    vitamin D (ERGOCALCIFEROL) 1.25 MG (30351 UT) capsule capsule TAKE 1 CAPSULE BY MOUTH ONE TIME PER WEEK 12 capsule 3     No facility-administered medications prior to visit.     New Medications Ordered This Visit   Medications    ondansetron ODT (ZOFRAN-ODT) 4 MG disintegrating tablet     Sig: Take 1 tablet by mouth Every 8 (Eight) Hours As Needed for Nausea or Vomiting.     Dispense:  20 tablet     Refill:  0     [unfilled]  There are no discontinued medications.      No follow-ups on file.    Patient was given instructions and counseling regarding her condition or for health maintenance advice. Please see specific information pulled into the AVS if appropriate.

## 2025-02-22 ENCOUNTER — HOSPITAL ENCOUNTER (EMERGENCY)
Facility: HOSPITAL | Age: 75
Discharge: HOME | End: 2025-02-22
Attending: EMERGENCY MEDICINE
Payer: MEDICARE

## 2025-02-22 ENCOUNTER — APPOINTMENT (OUTPATIENT)
Dept: GENERAL RADIOLOGY | Facility: HOSPITAL | Age: 75
End: 2025-02-22
Payer: MEDICARE

## 2025-02-22 VITALS
WEIGHT: 161 LBS | RESPIRATION RATE: 18 BRPM | HEIGHT: 60 IN | BODY MASS INDEX: 31.61 KG/M2 | OXYGEN SATURATION: 94 % | SYSTOLIC BLOOD PRESSURE: 130 MMHG | HEART RATE: 68 BPM | DIASTOLIC BLOOD PRESSURE: 64 MMHG | TEMPERATURE: 99.7 F

## 2025-02-22 DIAGNOSIS — R05.1 ACUTE COUGH: ICD-10-CM

## 2025-02-22 DIAGNOSIS — I10 ESSENTIAL HYPERTENSION: ICD-10-CM

## 2025-02-22 DIAGNOSIS — J10.1 INFLUENZA A: Primary | ICD-10-CM

## 2025-02-22 LAB
ALBUMIN SERPL-MCNC: 4.1 G/DL (ref 3.5–5.2)
ALBUMIN/GLOB SERPL: 1.6 G/DL
ALP SERPL-CCNC: 56 U/L (ref 39–117)
ALT SERPL W P-5'-P-CCNC: 22 U/L (ref 1–33)
ANION GAP SERPL CALCULATED.3IONS-SCNC: 11.6 MMOL/L (ref 5–15)
AST SERPL-CCNC: 30 U/L (ref 1–32)
BACTERIA UR QL AUTO: ABNORMAL /HPF
BASOPHILS # BLD AUTO: 0 10*3/MM3 (ref 0–0.2)
BASOPHILS NFR BLD AUTO: 0 % (ref 0–1.5)
BILIRUB SERPL-MCNC: 0.6 MG/DL (ref 0–1.2)
BILIRUB UR QL STRIP: NEGATIVE
BUN SERPL-MCNC: 17 MG/DL (ref 8–23)
BUN/CREAT SERPL: 17.9 (ref 7–25)
CALCIUM SPEC-SCNC: 8.4 MG/DL (ref 8.6–10.5)
CHLORIDE SERPL-SCNC: 93 MMOL/L (ref 98–107)
CLARITY UR: CLEAR
CO2 SERPL-SCNC: 25.4 MMOL/L (ref 22–29)
COLOR UR: YELLOW
CREAT SERPL-MCNC: 0.95 MG/DL (ref 0.57–1)
D-LACTATE SERPL-SCNC: 1.4 MMOL/L (ref 0.5–2)
DEPRECATED RDW RBC AUTO: 41.8 FL (ref 37–54)
EGFRCR SERPLBLD CKD-EPI 2021: 62.6 ML/MIN/1.73
EOSINOPHIL # BLD AUTO: 0.02 10*3/MM3 (ref 0–0.4)
EOSINOPHIL NFR BLD AUTO: 0.5 % (ref 0.3–6.2)
ERYTHROCYTE [DISTWIDTH] IN BLOOD BY AUTOMATED COUNT: 12.5 % (ref 12.3–15.4)
FLUAV RNA RESP QL NAA+PROBE: DETECTED
FLUBV RNA RESP QL NAA+PROBE: NOT DETECTED
GLOBULIN UR ELPH-MCNC: 2.5 GM/DL
GLUCOSE SERPL-MCNC: 147 MG/DL (ref 65–99)
GLUCOSE UR STRIP-MCNC: NEGATIVE MG/DL
HCT VFR BLD AUTO: 38.3 % (ref 34–46.6)
HGB BLD-MCNC: 13.4 G/DL (ref 12–15.9)
HGB UR QL STRIP.AUTO: NEGATIVE
HOLD SPECIMEN: NORMAL
HOLD SPECIMEN: NORMAL
HYALINE CASTS UR QL AUTO: ABNORMAL /LPF
IMM GRANULOCYTES # BLD AUTO: 0.01 10*3/MM3 (ref 0–0.05)
IMM GRANULOCYTES NFR BLD AUTO: 0.3 % (ref 0–0.5)
KETONES UR QL STRIP: NEGATIVE
LEUKOCYTE ESTERASE UR QL STRIP.AUTO: ABNORMAL
LIPASE SERPL-CCNC: 51 U/L (ref 13–60)
LYMPHOCYTES # BLD AUTO: 0.89 10*3/MM3 (ref 0.7–3.1)
LYMPHOCYTES # BLD MANUAL: 1.3 10*3/MM3 (ref 0.7–3.1)
LYMPHOCYTES NFR BLD AUTO: 23.4 % (ref 19.6–45.3)
LYMPHOCYTES NFR BLD MANUAL: 8 % (ref 5–12)
MCH RBC QN AUTO: 32.2 PG (ref 26.6–33)
MCHC RBC AUTO-ENTMCNC: 35 G/DL (ref 31.5–35.7)
MCV RBC AUTO: 92.1 FL (ref 79–97)
MONOCYTES # BLD AUTO: 0.87 10*3/MM3 (ref 0.1–0.9)
MONOCYTES # BLD: 0.3 10*3/MM3 (ref 0.1–0.9)
MONOCYTES NFR BLD AUTO: 22.8 % (ref 5–12)
NEUTROPHILS # BLD AUTO: 2.21 10*3/MM3 (ref 1.7–7)
NEUTROPHILS NFR BLD AUTO: 2.02 10*3/MM3 (ref 1.7–7)
NEUTROPHILS NFR BLD AUTO: 53 % (ref 42.7–76)
NEUTROPHILS NFR BLD MANUAL: 57 % (ref 42.7–76)
NEUTS BAND NFR BLD MANUAL: 1 % (ref 0–5)
NITRITE UR QL STRIP: NEGATIVE
NRBC BLD AUTO-RTO: 0 /100 WBC (ref 0–0.2)
PH UR STRIP.AUTO: 6 [PH] (ref 4.5–8)
PLAT MORPH BLD: NORMAL
PLATELET # BLD AUTO: 174 10*3/MM3 (ref 140–450)
PMV BLD AUTO: 10.3 FL (ref 6–12)
POTASSIUM SERPL-SCNC: 3.3 MMOL/L (ref 3.5–5.2)
PROT SERPL-MCNC: 6.6 G/DL (ref 6–8.5)
PROT UR QL STRIP: NEGATIVE
RBC # BLD AUTO: 4.16 10*6/MM3 (ref 3.77–5.28)
RBC # UR STRIP: ABNORMAL /HPF
RBC MORPH BLD: NORMAL
REF LAB TEST METHOD: ABNORMAL
RSV RNA RESP QL NAA+PROBE: NOT DETECTED
SARS-COV-2 RNA RESP QL NAA+PROBE: NOT DETECTED
SODIUM SERPL-SCNC: 130 MMOL/L (ref 136–145)
SP GR UR STRIP: 1.01 (ref 1–1.03)
SQUAMOUS #/AREA URNS HPF: ABNORMAL /HPF
UROBILINOGEN UR QL STRIP: ABNORMAL
VARIANT LYMPHS NFR BLD MANUAL: 34 % (ref 19.6–45.3)
WBC # UR STRIP: ABNORMAL /HPF
WBC MORPH BLD: NORMAL
WBC NRBC COR # BLD AUTO: 3.81 10*3/MM3 (ref 3.4–10.8)
WHOLE BLOOD HOLD COAG: NORMAL
WHOLE BLOOD HOLD SPECIMEN: NORMAL

## 2025-02-22 PROCEDURE — 81001 URINALYSIS AUTO W/SCOPE: CPT | Performed by: EMERGENCY MEDICINE

## 2025-02-22 PROCEDURE — 99283 EMERGENCY DEPT VISIT LOW MDM: CPT | Performed by: EMERGENCY MEDICINE

## 2025-02-22 PROCEDURE — 83605 ASSAY OF LACTIC ACID: CPT | Performed by: EMERGENCY MEDICINE

## 2025-02-22 PROCEDURE — 83690 ASSAY OF LIPASE: CPT | Performed by: EMERGENCY MEDICINE

## 2025-02-22 PROCEDURE — 25810000003 SODIUM CHLORIDE 0.9 % SOLUTION: Performed by: EMERGENCY MEDICINE

## 2025-02-22 PROCEDURE — 80053 COMPREHEN METABOLIC PANEL: CPT | Performed by: EMERGENCY MEDICINE

## 2025-02-22 PROCEDURE — 87637 SARSCOV2&INF A&B&RSV AMP PRB: CPT | Performed by: EMERGENCY MEDICINE

## 2025-02-22 PROCEDURE — 85025 COMPLETE CBC W/AUTO DIFF WBC: CPT | Performed by: EMERGENCY MEDICINE

## 2025-02-22 PROCEDURE — 71045 X-RAY EXAM CHEST 1 VIEW: CPT

## 2025-02-22 PROCEDURE — 85007 BL SMEAR W/DIFF WBC COUNT: CPT | Performed by: EMERGENCY MEDICINE

## 2025-02-22 RX ORDER — SODIUM CHLORIDE 0.9 % (FLUSH) 0.9 %
10 SYRINGE (ML) INJECTION AS NEEDED
Status: DISCONTINUED | OUTPATIENT
Start: 2025-02-22 | End: 2025-02-22 | Stop reason: HOSPADM

## 2025-02-22 RX ADMIN — SODIUM CHLORIDE 1000 ML: 9 INJECTION, SOLUTION INTRAVENOUS at 19:58

## 2025-02-23 NOTE — ED PROVIDER NOTES
Subjective   History of Present Illness  73 yo F with PMHx htn, dm presents with 1-wk h/o not feeling well.  Pt notes that she's had some cough, congestion, chills, vomiting, and diarrhea.  Pt has been seen by pcp twice this week and had negative flu, covid, strep tests.  Pt states vomiting has improved with zofran but she continues to have cough with some generalized weakness and decided to come to ED for evaluation.        Review of Systems   Constitutional:  Positive for chills. Negative for fever.   HENT:  Positive for congestion.    Respiratory:  Positive for cough.    Cardiovascular:  Negative for chest pain.   Gastrointestinal:  Positive for diarrhea and vomiting. Negative for abdominal pain.   Genitourinary:  Negative for difficulty urinating.       Past Medical History:   Diagnosis Date    Acute bacterial conjunctivitis of left eye 10/20/2016    Allergic rhinitis     Asthma     Basal cell carcinoma of skin     Bronchitis     Colon polyp     GERD (gastroesophageal reflux disease)     HL (hearing loss)     Hyperlipidemia     Hypertension     PAF (paroxysmal atrial fibrillation)     one episode, 2015; was briefly treated with Xarelto    Pneumonia     Premature atrial complexes 2022    PVCs (premature ventricular contractions) 2022    Rotator cuff tendonitis     Squamous cell skin cancer     Type 2 diabetes mellitus     UTI (urinary tract infection)     Vitamin D deficiency        Allergies   Allergen Reactions    Penicillins Anaphylaxis and Swelling    Erythromycin Other (See Comments)     JOINT PAIN AND SWELLING    Astelin [Azelastine] Cough       Past Surgical History:   Procedure Laterality Date    BRONCHOSCOPY N/A 3/8/2024    Procedure: BRONCHOSCOPY WITH ENDOBRONCHIAL ULTRASOUND with FNA's, BAL;  Surgeon: Mauro Galloway MD;  Location[facility] ENDOSCOPY;  Service: Pulmonary;  Laterality: N/A;  hilar lung mass    CARDIAC CATHETERIZATION      Cardiologist takes care of      SECTION      CHOLECYSTECTOMY      COLONOSCOPY      COLONOSCOPY N/A 2024    Procedure: COLONOSCOPY to Cecum with Polypectomy;  Surgeon: Wisam Mccarthy MD;  Location: [facility] MAIN OR;  Service: Gastroenterology;  Laterality: N/A;  Diverticulosis, Descending Colon Polyp    EYE SURGERY Bilateral     cataract surgery    GUM SURGERY  2017    HYSTERECTOMY      KNEE SURGERY         Family History   Problem Relation Age of Onset    Stroke Mother     Hypertension Mother     Heart disease Father     Heart attack Father     Alcohol abuse Father         Ptsd    Hypertension Sister     Colon polyps Sister     Heart disease Brother     Cancer Paternal Grandmother             Breast cancer Paternal Grandmother     Heart disease Paternal Grandfather     Diabetes Paternal Grandfather     Kidney disease Sister         Jacks2       Social History     Socioeconomic History    Marital status:    Tobacco Use    Smoking status: Never     Passive exposure: Never    Smokeless tobacco: Never    Tobacco comments:     Father was a geavy smoker   Vaping Use    Vaping status: Never Used   Substance and Sexual Activity    Alcohol use: No    Drug use: No    Sexual activity: Yes     Partners: Male           Objective   Physical Exam  Vitals and nursing note reviewed.   Constitutional:       Comments: well-appearing   Pulmonary:      Effort: Pulmonary effort is normal. No respiratory distress.      Comments: +cough  Abdominal:      Tenderness: There is no abdominal tenderness.   Neurological:      General: No focal deficit present.      Comments: Ambulatory in ED         Procedures           ED Course                                                       Medical Decision Making  73 yo F with PMHx dm, htn presents with 1-wk h/o not feeling well - chills, cough, congestion, vomiting, diarrhea.  Examination with some cough but otherwise unremarkable with ctab, no increased wob.  Will evaluate for acute  process.    20:41 EST  +fluA.  Wbc ok.  UA no uti.  Cxr nothing acute.  Work-up otherwise unremarkable.   No other evidence of sepsis, respiratory failure, significant electrolyte abnormality, severe anemia, or other emergent medical condition clinically.  Pt doing ok, appears comfortable, seems stable for home care.  No indication for hospitalization or further emergent management in this context.  Discussed results and poc for dc home, symptom management, follow-up, return precautions.      Problems Addressed:  Acute cough: complicated acute illness or injury  Influenza A: complicated acute illness or injury    Amount and/or Complexity of Data Reviewed  Labs: ordered.  Radiology: ordered.    Risk  Prescription drug management.        Final diagnoses:   Influenza A   Acute cough       ED Disposition  ED Disposition       ED Disposition   Discharge    Condition   Stable    Comment   --               Terra Reddy, APRN  [address] [facility] [address] [facility] [address]  [facility] [id number] [phone]          [facility] EMERGENCY DEPARTMENT  [address] [facility] [address] [phone]    As needed         Medication List      No changes were made to your prescriptions during this visit.            Alfonso Davis MD  02/22/25 2042       Alfonso Davis MD  02/22/25 2149

## 2025-02-23 NOTE — DISCHARGE INSTRUCTIONS
Your were evaluated for influenza A.  Your other tests were ok.  Please continue to treat symptoms at home as needed.  You should follow up with your doctor.  Please return to the Emergency Department with any concerning symptoms.  Alfonso Davis MD

## 2025-02-24 RX ORDER — VALSARTAN AND HYDROCHLOROTHIAZIDE 320; 12.5 MG/1; MG/1
1 TABLET, FILM COATED ORAL DAILY
Qty: 90 TABLET | Refills: 1 | Status: SHIPPED | OUTPATIENT
Start: 2025-02-24

## 2025-02-24 NOTE — TELEPHONE ENCOUNTER
Rx Refill Note  Requested Prescriptions     Pending Prescriptions Disp Refills    valsartan-hydrochlorothiazide (DIOVAN-HCT) 320-12.5 MG per tablet [Pharmacy Med Name: VALSARTAN-HCTZ 320-12.5 MG TAB] 90 tablet 1     Sig: TAKE 1 TABLET BY MOUTH EVERY DAY      Last office visit with prescribing clinician: 8/26/2024   Last telemedicine visit with prescribing clinician: Visit date not found   Next office visit with prescribing clinician: 3/4/2025                         Would you like a call back once the refill request has been completed: [] Yes [] No    If the office needs to give you a call back, can they leave a voicemail: [] Yes [] No    Nadine Land MA  02/24/25, 14:33 EST

## 2025-02-25 ENCOUNTER — TELEPHONE (OUTPATIENT)
Dept: INTERNAL MEDICINE | Facility: CLINIC | Age: 75
End: 2025-02-25

## 2025-02-25 NOTE — TELEPHONE ENCOUNTER
Caller: Debbie Cheng    Relationship: Self    Best call back number: [phone]     Who are you requesting to speak with (clinical staff, provider,  specific staff member): CLINICAL STAFF    What was the call regarding: HAS BLOOD IN MUCUS WHEN BLOWING HER NOSE WANTS TO KNOW WHAT SHE CAN DO ABOUT IT PLEASE CALL AND ADVISE

## 2025-02-26 ENCOUNTER — PATIENT OUTREACH (OUTPATIENT)
Dept: CASE MANAGEMENT | Facility: OTHER | Age: 75
End: 2025-02-26
Payer: MEDICARE

## 2025-02-26 NOTE — OUTREACH NOTE
AMBULATORY CASE MANAGEMENT NOTE    Names and Relationships of Patient/Support Persons: Contact: Debbie Cheng; Relationship: Self -     Patient Outreach    Incoming call from the patient responding to outreach from [facility] to follow up with ED visit.  She denies questions or needs related to her ED visit.  She prefers to make her follow up PCP appointment.  She is not interested in case management services at this time when discussed the HRCM program.    Maria L SALGADO  Ambulatory Case Management    2/26/2025, 14:41 EST

## 2025-02-27 ENCOUNTER — HOSPITAL ENCOUNTER (EMERGENCY)
Facility: HOSPITAL | Age: 75
Discharge: HOME | End: 2025-02-27
Attending: EMERGENCY MEDICINE
Payer: MEDICARE

## 2025-02-27 VITALS
OXYGEN SATURATION: 95 % | SYSTOLIC BLOOD PRESSURE: 135 MMHG | HEART RATE: 64 BPM | RESPIRATION RATE: 18 BRPM | BODY MASS INDEX: 31.8 KG/M2 | WEIGHT: 162 LBS | TEMPERATURE: 98 F | HEIGHT: 60 IN | DIASTOLIC BLOOD PRESSURE: 76 MMHG

## 2025-02-27 DIAGNOSIS — J18.9 ATYPICAL PNEUMONIA: Primary | ICD-10-CM

## 2025-02-27 DIAGNOSIS — R05.1 ACUTE COUGH: ICD-10-CM

## 2025-02-27 DIAGNOSIS — J10.1 INFLUENZA A: ICD-10-CM

## 2025-02-27 PROCEDURE — 99282 EMERGENCY DEPT VISIT SF MDM: CPT | Performed by: EMERGENCY MEDICINE

## 2025-02-27 RX ORDER — PREDNISONE 20 MG/1
TABLET ORAL
Qty: 12 TABLET | Refills: 0 | Status: SHIPPED | OUTPATIENT
Start: 2025-02-27

## 2025-02-27 RX ORDER — DEXTROMETHORPHAN HYDROBROMIDE AND PROMETHAZINE HYDROCHLORIDE 15; 6.25 MG/5ML; MG/5ML
5 SYRUP ORAL EVERY 6 HOURS PRN
Qty: 100 ML | Refills: 0 | Status: SHIPPED | OUTPATIENT
Start: 2025-02-27

## 2025-02-27 RX ORDER — DOXYCYCLINE 100 MG/1
100 CAPSULE ORAL EVERY 12 HOURS
Qty: 14 CAPSULE | Refills: 0 | Status: SHIPPED | OUTPATIENT
Start: 2025-02-27

## 2025-02-27 NOTE — ED PROVIDER NOTES
Subjective   History of Present Illness  Patient presents complaining with a 8-day history of upper respiratory symptoms and concerned about pneumonia.  Patient has seen her PCP twice and the emergency room once.  Patient was initially diagnosed with a viral gastroenteritis because she was having some stomach issues but that resolved and then started having some cough and bodyaches so went to the ER and they diagnosed her with flu.  No medication was given for the flu.  Patient does have an inhaler at home that she uses a spacer with if she needs it.  Patient does not use her inhaler daily but only when she is sick or if her allergies are flaring.  Patient has minimal sputum production.  Patient did see a little bit of streaking blood a few days ago and contacted her provider but that has resolved.  Patient did take 1-2 doses of Mucinex but said it dried her out too much so she stopped taking it.  No other medication for therapy has been taken.  Patient denies any recent travel, sick contact, bad food exposure, or trauma.  Patient otherwise at her baseline health no recent chest pain, shortness of breath, or swelling of her arms or legs.      Review of Systems   All other systems reviewed and are negative.      Past Medical History:   Diagnosis Date    Acute bacterial conjunctivitis of left eye 10/20/2016    Allergic rhinitis     Asthma     Basal cell carcinoma of skin     Bronchitis     Colon polyp     GERD (gastroesophageal reflux disease)     HL (hearing loss) 2020a    Hyperlipidemia     Hypertension     PAF (paroxysmal atrial fibrillation)     one episode, 5/2015; was briefly treated with Xarelto    Pneumonia     Premature atrial complexes 05/24/2022    PVCs (premature ventricular contractions) 05/24/2022    Rotator cuff tendonitis     Squamous cell skin cancer     Type 2 diabetes mellitus     UTI (urinary tract infection)     Vitamin D deficiency        Allergies   Allergen Reactions    Penicillins Anaphylaxis  and Swelling    Erythromycin Other (See Comments)     JOINT PAIN AND SWELLING    Astelin [Azelastine] Cough       Past Surgical History:   Procedure Laterality Date    BRONCHOSCOPY N/A 3/8/2024    Procedure: BRONCHOSCOPY WITH ENDOBRONCHIAL ULTRASOUND with FNA's, BAL;  Surgeon: Mauro Galloway MD;  Location:  [facility] ENDOSCOPY;  Service: Pulmonary;  Laterality: N/A;  hilar lung mass    CARDIAC CATHETERIZATION      Cardiologist takes care of     SECTION      CHOLECYSTECTOMY      COLONOSCOPY      COLONOSCOPY N/A 2024    Procedure: COLONOSCOPY to Cecum with Polypectomy;  Surgeon: Wisam Mccarthy MD;  Location: [facility] MAIN OR;  Service: Gastroenterology;  Laterality: N/A;  Diverticulosis, Descending Colon Polyp    EYE SURGERY Bilateral     cataract surgery    GUM SURGERY  2017    HYSTERECTOMY      KNEE SURGERY         Family History   Problem Relation Age of Onset    Stroke Mother     Hypertension Mother     Heart disease Father     Heart attack Father     Alcohol abuse Father         Ptsd    Hypertension Sister     Colon polyps Sister     Heart disease Brother     Cancer Paternal Grandmother             Breast cancer Paternal Grandmother     Heart disease Paternal Grandfather     Diabetes Paternal Grandfather     Kidney disease Sister         Jacks2       Social History     Socioeconomic History    Marital status:    Tobacco Use    Smoking status: Never     Passive exposure: Never    Smokeless tobacco: Never    Tobacco comments:     Father was a [employer] smoker   Vaping Use    Vaping status: Never Used   Substance and Sexual Activity    Alcohol use: No    Drug use: No    Sexual activity: Yes     Partners: Male           Objective   Physical Exam  Vitals and nursing note reviewed.   HENT:      Head: Normocephalic.      Right Ear: External ear normal.      Left Ear: External ear normal.      Nose: Nose normal.      Mouth/Throat:      Mouth: Mucous membranes are moist.      Pharynx:  Oropharynx is clear.   Eyes:      Conjunctiva/sclera: Conjunctivae normal.   Cardiovascular:      Rate and Rhythm: Normal rate and regular rhythm.      Heart sounds: Normal heart sounds.   Pulmonary:      Effort: Pulmonary effort is normal.      Breath sounds: No stridor. No wheezing, rhonchi or rales.      Comments: Coarse bronchial breath sounds that clear with cough  Musculoskeletal:         General: No swelling.      Cervical back: Neck supple.   Skin:     General: Skin is warm and dry.      Capillary Refill: Capillary refill takes 2 to 3 seconds.   Neurological:      Mental Status: She is alert and oriented to person, place, and time.   Psychiatric:         Mood and Affect: Mood normal.         Behavior: Behavior normal.         Procedures           ED Course                                                       Medical Decision Making  Ddx pneumonia, bronchitis, pneumonitis, viral syndrome    0955 patient has no major findings on physical exam to indicate pneumonia.  Patient likely still is dealing with the flu but with the increased cough and bronchial secretions I am going to cover her for potential pneumonia.  Patient says that is a frequent concern because she has had that happen a few times in the past.  Patient is also encouraged to use the Mucinex and increase her hydration.  All questions personally answered at the bedside and all d/c instructions personally reviewed with pt.  Discussed the importance of close outpt. f/u and pt. understands this and agrees to do so.  Pt agrees to return to ED immediately for any new, persistent, or worsening symptoms.  Patient spouse was present for the entire evaluation and discharge instructions.      EMR Dragon/Transcription disclaimer:  Much of this encounter note is an electronic transcription/translation of spoken language to printed text using the Dragon Dictation System       Problems Addressed:  Acute cough: complicated acute illness or injury  Atypical  pneumonia: complicated acute illness or injury  Influenza A: complicated acute illness or injury    Risk  Prescription drug management.        Final diagnoses:   Atypical pneumonia   Influenza A   Acute cough       ED Disposition  ED Disposition       ED Disposition   Discharge    Condition   Stable    Comment   --               Terra Reddy, APRN  [address]  [address]  [place] [address] [phone]    In 3 days  If symptoms worsen         Medication List        New Prescriptions      doxycycline 100 MG capsule  Commonly known as: MONODOX  Take 1 capsule by mouth Every 12 (Twelve) Hours.     predniSONE 20 MG tablet  Commonly known as: DELTASONE  Take 3 tabs p.o. daily on days 1-2, then 2 tabs p.o. on days 3-4, then 1 tab p.o. on days 5-6.     promethazine-dextromethorphan 6.25-15 MG/5ML syrup  Commonly known as: PROMETHAZINE-DM  Take 5 mL by mouth Every 6 (Six) Hours As Needed for Cough.               Where to Get Your Medications        These medications were sent to [facility]/pharmacy #[id number] - [place], [place] - [address] [phone] [place] [phone]   [address] [place] [address]      Phone: [phone]   doxycycline 100 MG capsule  predniSONE 20 MG tablet  promethazine-dextromethorphan 6.25-15 MG/5ML syrup            Dante Hui MD  02/27/25 1200

## 2025-02-27 NOTE — TELEPHONE ENCOUNTER
Caller: Debbie Cheng    Relationship: Self    Best call back number: [phone]     Who are you requesting to speak with (clinical staff, provider,  specific staff member): CLINICAL    What was the call regarding: PATIENT IS STILL HAVING SYMPTOMS FROM FLU A, DEALING WITH YELLOW/GREENISH PHLEGM AND JUST IS NOT GETTING ANY BETTER. WOULD LIKE TO KNOW WHAT TO DO WHETHER MEDICATION OR OFFICE VISIT

## 2025-03-04 ENCOUNTER — OFFICE VISIT (OUTPATIENT)
Dept: INTERNAL MEDICINE | Facility: CLINIC | Age: 75
End: 2025-03-04
Payer: MEDICARE

## 2025-03-04 VITALS
OXYGEN SATURATION: 95 % | DIASTOLIC BLOOD PRESSURE: 76 MMHG | BODY MASS INDEX: 30.86 KG/M2 | SYSTOLIC BLOOD PRESSURE: 146 MMHG | WEIGHT: 157.2 LBS | HEART RATE: 88 BPM | HEIGHT: 60 IN | TEMPERATURE: 98.6 F

## 2025-03-04 DIAGNOSIS — E55.9 VITAMIN D DEFICIENCY: ICD-10-CM

## 2025-03-04 DIAGNOSIS — Z00.00 ENCOUNTER FOR ANNUAL WELLNESS VISIT (AWV) IN MEDICARE PATIENT: Primary | ICD-10-CM

## 2025-03-04 DIAGNOSIS — E11.9 CONTROLLED TYPE 2 DIABETES MELLITUS WITHOUT COMPLICATION, WITHOUT LONG-TERM CURRENT USE OF INSULIN: ICD-10-CM

## 2025-03-04 DIAGNOSIS — I10 ESSENTIAL HYPERTENSION: ICD-10-CM

## 2025-03-04 DIAGNOSIS — J18.9 RECURRENT PNEUMONIA: ICD-10-CM

## 2025-03-04 DIAGNOSIS — J45.21 MILD INTERMITTENT ASTHMA WITH ACUTE EXACERBATION: ICD-10-CM

## 2025-03-04 DIAGNOSIS — I48.0 PAROXYSMAL ATRIAL FIBRILLATION: ICD-10-CM

## 2025-03-04 RX ORDER — FLUTICASONE PROPIONATE AND SALMETEROL XINAFOATE 115; 21 UG/1; UG/1
2 AEROSOL, METERED RESPIRATORY (INHALATION)
Qty: 12 G | Refills: 0 | Status: SHIPPED | OUTPATIENT
Start: 2025-03-04

## 2025-03-04 NOTE — ASSESSMENT & PLAN NOTE
Orders:    Microalbumin / Creatinine Urine Ratio - Urine, Clean Catch    CBC & Differential; Future    Comprehensive Metabolic Panel; Future    Lipid Panel With / Chol / HDL Ratio; Future    Hemoglobin A1c; Future

## 2025-03-04 NOTE — ASSESSMENT & PLAN NOTE
Orders:    CBC & Differential; Future    Comprehensive Metabolic Panel; Future    Lipid Panel With / Chol / HDL Ratio; Future

## 2025-03-04 NOTE — PROGRESS NOTES
Subjective   The ABCs of the Annual Wellness Visit  Medicare Wellness Visit      Debbie Cheng is a 74 y.o. patient who presents for a Medicare Wellness Visit.    The following portions of the patient's history were reviewed and   updated as appropriate: allergies, current medications, past family history, past medical history, past social history, past surgical history, and problem list.    Compared to one year ago, the patient's physical   health is the same.  Compared to one year ago, the patient's mental   health is the same.    Recent Hospitalizations:  This patient has had a [facility] admission record on file within the last 365 days.  Current Medical Providers:  Patient Care Team:  Terra Reddy APRN as PCP - General (Family Medicine)  Trace Onofre MD as Consulting Physician (Ophthalmology)  Keon Mann MD as Consulting Physician (Cardiology)  Parrish Oliver MD as Consulting Physician (Orthopedic Surgery)  Emily Aranda MD as Consulting Physician (Dermatology)  Wisam Mccarthy MD as Consulting Physician (Gastroenterology)  Wing Hall MD as Consulting Physician (Orthopedic Surgery)  Mauro Galloway MD as Consulting Physician (Pulmonary Disease)    Outpatient Medications Prior to Visit   Medication Sig Dispense Refill    doxycycline (MONODOX) 100 MG capsule Take 1 capsule by mouth Every 12 (Twelve) Hours. 14 capsule 0    fluticasone (FLONASE) 50 MCG/ACT nasal spray Administer 2 sprays into the nostril(s) as directed by provider Daily. Administer 2 sprays in each nostril for each dose.      levocetirizine (XYZAL) 5 MG tablet Take 1 tablet by mouth Every Morning.      magnesium oxide (MAGOX) 400 (241.3 Mg) MG tablet tablet Take 250 mg by mouth Every Other Day.      metoprolol tartrate (LOPRESSOR) 25 MG tablet TAKE 1 TABLET BY MOUTH TWICE A  tablet 3    montelukast (SINGULAIR) 10 MG tablet TAKE 1 TABLET BY MOUTH EVERY DAY AT NIGHT 90 tablet 3     multivitamin with minerals tablet tablet Take 1 tablet by mouth Daily.      rivaroxaban (Xarelto) 20 MG tablet TAKE 1 TABLET BY MOUTH EVERY DAY 90 tablet 1    rosuvastatin (CRESTOR) 20 MG tablet TAKE 1 TABLET BY MOUTH EVERY DAY (STOP SIMVASTATIN) 90 tablet 1    valsartan-hydrochlorothiazide (DIOVAN-HCT) 320-12.5 MG per tablet TAKE 1 TABLET BY MOUTH EVERY DAY 90 tablet 1    VENTOLIN  (90 Base) MCG/ACT inhaler 2 puffs Every 4 (Four) Hours As Needed for Shortness of Air or Wheezing.  3    vitamin D (ERGOCALCIFEROL) 1.25 MG (52372 UT) capsule capsule TAKE 1 CAPSULE BY MOUTH ONE TIME PER WEEK 12 capsule 3    glucose blood (OneTouch Ultra) test strip USE TO CHECK BLOOD SUGAR DAILY. USE AS INSTRUCTED.(E11.65,KS) (Patient not taking: Reported on 3/4/2025) 100 each 3    glucose monitor monitoring kit Use glucose meter to check fasting glucose once daily for diabetes monitoring. E11.9 (Patient not taking: Reported on 3/4/2025) 1 each 0    OneTouch Delica Lancets 33G misc USE ONE LANCET DAILY TO CHECK FASTING GLUCOSE FOR DIABETES MONITORING. E11.9 (Patient not taking: Reported on 3/4/2025) 100 each 12    predniSONE (DELTASONE) 20 MG tablet Take 3 tabs p.o. daily on days 1-2, then 2 tabs p.o. on days 3-4, then 1 tab p.o. on days 5-6. (Patient not taking: Reported on 3/4/2025) 12 tablet 0    promethazine-dextromethorphan (PROMETHAZINE-DM) 6.25-15 MG/5ML syrup Take 5 mL by mouth Every 6 (Six) Hours As Needed for Cough. (Patient not taking: Reported on 3/4/2025) 100 mL 0    ondansetron ODT (ZOFRAN-ODT) 4 MG disintegrating tablet Take 1 tablet by mouth Every 8 (Eight) Hours As Needed for Nausea or Vomiting. 20 tablet 0     No facility-administered medications prior to visit.     No opioid medication identified on active medication list. I have reviewed chart for other potential  high risk medication/s and harmful drug interactions in the elderly.      Aspirin is not on active medication list.  Aspirin use is not indicated  "based on review of current medical condition/s. Risk of harm outweighs potential benefits.  .    Patient Active Problem List   Diagnosis    Paroxysmal atrial fibrillation    Essential hypertension    Vitamin D deficiency    Controlled type 2 diabetes mellitus without complication, without long-term current use of insulin    Encounter for screening colonoscopy    Hyperlipidemia    Eczema    Anemia of unknown etiology    Postmenopausal    Varicose veins of both lower extremities with pain    Family history of renal cancer    Squamous cell carcinoma in situ (SCCIS) of skin of left upper arm    Environmental allergies    Onychia of toe of left foot    PVCs (premature ventricular contractions)    Premature atrial complexes    FH: ovarian cancer    Epigastric pain    Lung mass    Personal history of colonic polyps     Advance Care Planning Advance Directive is not on file.  ACP discussion was held with the patient during this visit. Patient has an advance directive (not in EMR), copy requested.            Objective   Vitals:    03/04/25 1010   BP: 146/76   BP Location: Left arm   Patient Position: Sitting   Cuff Size: Adult   Pulse: 88   Temp: 98.6 °F (37 °C)   TempSrc: Infrared   SpO2: 95%   Weight: 71.3 kg (157 lb 3.2 oz)   Height: 152.4 cm (60\")   PainSc: 0-No pain       Estimated body mass index is 30.7 kg/m² as calculated from the following:    Height as of this encounter: 152.4 cm (60\").    Weight as of this encounter: 71.3 kg (157 lb 3.2 oz).                Does the patient have evidence of cognitive impairment? No                                                                                                Health  Risk Assessment    Smoking Status:  Social History     Tobacco Use   Smoking Status Never    Passive exposure: Never   Smokeless Tobacco Never   Tobacco Comments    Father was a heavy smoker     Alcohol Consumption:  Social History     Substance and Sexual Activity   Alcohol Use No       Fall Risk " Screen  STEADI Fall Risk Assessment was completed, and patient is at LOW risk for falls.Assessment completed on:3/4/2025    Depression Screening   Little interest or pleasure in doing things? Not at all   Feeling down, depressed, or hopeless? Not at all   PHQ-2 Total Score 0      Health Habits and Functional and Cognitive Screenin/25/2025    10:13 AM   Functional & Cognitive Status   Do you have difficulty preparing food and eating? No    Do you have difficulty bathing yourself, getting dressed or grooming yourself? No    Do you have difficulty using the toilet? No    Do you have difficulty moving around from place to place? No    Do you have trouble with steps or getting out of a bed or a chair? No    Current Diet Well Balanced Diet    Dental Exam Up to date    Eye Exam Up to date    Exercise (times per week) 5 times per week    Current Exercises Include Home Exercise Program (TV, Computer, Etc.);Walking    Do you need help using the phone?  No    Are you deaf or do you have serious difficulty hearing?  No    Do you need help to go to places out of walking distance? No    Do you need help shopping? No    Do you need help preparing meals?  No    Do you need help with housework?  No    Do you need help with laundry? No    Do you need help taking your medications? No    Do you need help managing money? No    Do you ever drive or ride in a car without wearing a seat belt? No    Have you felt unusual stress, anger or loneliness in the last month? No    Who do you live with? Spouse    If you need help, do you have trouble finding someone available to you? No    Have you been bothered in the last four weeks by sexual problems? No    Do you have difficulty concentrating, remembering or making decisions? No        Patient-reported           Age-appropriate Screening Schedule:  Refer to the list below for future screening recommendations based on patient's age, sex and/or medical conditions. Orders for these  recommended tests are listed in the plan section. The patient has been provided with a written plan.    Health Maintenance List  Health Maintenance   Topic Date Due    URINE MICROALBUMIN-CREATININE RATIO (uACR)  08/28/2019    HEMOGLOBIN A1C  02/19/2025    ANNUAL WELLNESS VISIT  02/21/2025    DIABETIC EYE EXAM  07/04/2025 (Originally 7/10/2024)    LIPID PANEL  08/19/2025    BMI FOLLOWUP  02/21/2026    TDAP/TD VACCINES (4 - Td or Tdap) 08/26/2026    DXA SCAN  04/17/2029    COLORECTAL CANCER SCREENING  11/12/2029    HEPATITIS C SCREENING  Completed    COVID-19 Vaccine  Completed    RSV Vaccine - Adults  Completed    INFLUENZA VACCINE  Completed    Pneumococcal Vaccine 50+  Completed    ZOSTER VACCINE  Completed    DIABETIC FOOT EXAM  Discontinued    MAMMOGRAM  Discontinued                                                                                                                                                CMS Preventative Services Quick Reference  Risk Factors Identified During Encounter  Immunizations Discussed/Encouraged: Prevnar 20 (Pneumococcal 20-valent conjugate)    The above risks/problems have been discussed with the patient.  Pertinent information has been shared with the patient in the After Visit Summary.  An After Visit Summary and PPPS were made available to the patient.    Follow Up:   Next Medicare Wellness visit to be scheduled in 1 year.         Additional E&M Note during same encounter follows:  Patient has additional, significant, and separately identifiable condition(s)/problem(s) that require work above and beyond the Medicare Wellness Visit     Chief Complaint  Medicare Wellness-subsequent    Subjective   HPI  Debbie is also being seen today for additional medical problem/s.    Review of Systems   Constitutional: Negative.    HENT:  Positive for congestion.    Eyes: Negative.    Respiratory:  Positive for cough and shortness of breath. Negative for wheezing and stridor.    Cardiovascular:   "Negative for chest pain, palpitations and leg swelling.   Gastrointestinal: Negative.    Endocrine: Negative.  Negative for cold intolerance, heat intolerance and polyuria.   Genitourinary: Negative.    Musculoskeletal: Negative.  Negative for arthralgias.   Allergic/Immunologic: Positive for environmental allergies.   Hematological: Negative.  Negative for adenopathy. Does not bruise/bleed easily.   Psychiatric/Behavioral: Negative.     All other systems reviewed and are negative.     She will need a f/u on Type 2 DM, PAF, HTN, Hyperlipidemia, Vit D def, and IFG.      She is was on Metformin XR 500mg daily for DM2. She has been checking her glucose fasting and post prandial, running 100-117. She has reduced portions and reduced carbs. She has been exercising by walking daily and senior exercise class. She quit Atif chi due to knee pain She was having stool urgency form Metformin, so she stopped it. She has lost 12 pounds.      She is on Diovan /12.5 mg and Lopressor 25mg BID for HTN and rate control. She is tolerating it well. BP at home 120-130s/80s. She has been doing Atif Chi, living well classes, and walking for exercise. She is followed by Dr. Mann for PAF. She is on Xarelto 20mg daily and lopressor 25mg 2 times daily      She is on Crestor 20mg nightly for cholesterol. (Previously treated with Zocor). She tolerates this well.      She has seasonal allergies and RAD. She is on Xyzal 5mg, Singulair 10mg and Flonase.     She has Vit D def, and takes a weekly supplement.     She has been ill off and on since 1-, and seen multiple times in officae and ER. She had Flu A, gastroenteritis and atypical pneumonia. She had a bronchoscopy wit biopsy 3-8-2024 afterdeveloping pneumonia last winter.         Objective   Vital Signs:  /76 (BP Location: Left arm, Patient Position: Sitting, Cuff Size: Adult)   Pulse 88   Temp 98.6 °F (37 °C) (Infrared)   Ht 152.4 cm (60\")   Wt 71.3 kg (157 lb 3.2 oz)   " SpO2 95%   BMI 30.70 kg/m²   Physical Exam  Vitals reviewed.   Constitutional:       Appearance: Normal appearance.   HENT:      Head: Normocephalic.      Right Ear: Tympanic membrane normal. There is no impacted cerumen.      Left Ear: Tympanic membrane normal. There is no impacted cerumen.      Nose: Rhinorrhea present.   Cardiovascular:      Rate and Rhythm: Normal rate and regular rhythm.      Pulses: Normal pulses.      Heart sounds: Normal heart sounds. No murmur heard.  Pulmonary:      Effort: Pulmonary effort is normal. No respiratory distress.      Breath sounds: Normal breath sounds. No stridor.   Abdominal:      General: There is no distension.      Palpations: Abdomen is soft. There is no mass.      Tenderness: There is no abdominal tenderness.      Hernia: No hernia is present.   Musculoskeletal:      Cervical back: Neck supple.      Right lower leg: No edema.      Left lower leg: No edema.   Neurological:      Mental Status: She is alert and oriented to person, place, and time.      Gait: Gait normal.   Psychiatric:         Mood and Affect: Mood normal.         Behavior: Behavior normal.         Thought Content: Thought content normal.         The following data was reviewed by: LOUIS Coles on 03/04/2025:  Data reviewed : Radiologic studies CXR,CT chest, Cardiology studies ECG 2-7-2024, and Consultant notes cardio, pulmonary  CBC          8/19/2024    08:47 2/22/2025    19:41   CBC   WBC 5.02  3.81    RBC 4.21  4.16    Hemoglobin 13.1  13.4    Hematocrit 40.0  38.3    MCV 95.0  92.1    MCH 31.1  32.2    MCHC 32.8  35.0    RDW 13.4  12.5    Platelets 228  174      CBC w/diff          8/19/2024    08:47 2/22/2025    19:41   CBC w/Diff   WBC 5.02  3.81    RBC 4.21  4.16    Hemoglobin 13.1  13.4    Hematocrit 40.0  38.3    MCV 95.0  92.1    MCH 31.1  32.2    MCHC 32.8  35.0    RDW 13.4  12.5    Platelets 228  174    Neutrophil Rel % 52.2  53.0    Immature Granulocyte Rel %  0.3    Lymphocyte Rel %  30.1  23.4    Monocyte Rel % 15.7  22.8    Eosinophil Rel % 1.6  0.5    Basophil Rel % 0.2  0.0      Lipid Panel          8/19/2024    08:47   Lipid Panel   Total Cholesterol 155    Triglycerides 79    HDL Cholesterol 68    VLDL Cholesterol 15    LDL Cholesterol  72        Electrolytes          8/19/2024    08:47 2/22/2025    19:41   Electrolytes   Sodium 141  130    Potassium 4.2  3.3    Chloride 105  93    Calcium 9.6  8.4      Renal Profile          8/19/2024    08:47 2/22/2025    19:41   Renal Profile   BUN 20  17    Creatinine 0.93  0.95      BMP          8/19/2024    08:47 2/22/2025    19:41   BMP   BUN 20  17    Creatinine 0.93  0.95    Sodium 141  130    Potassium 4.2  3.3    Chloride 105  93    CO2 26.4  25.4    Calcium 9.6  8.4                Assessment and Plan            Encounter for annual wellness visit (AWV) in Medicare patient         Controlled type 2 diabetes mellitus without complication, without long-term current use of insulin      Orders:    Microalbumin / Creatinine Urine Ratio - Urine, Clean Catch    CBC & Differential; Future    Comprehensive Metabolic Panel; Future    Lipid Panel With / Chol / HDL Ratio; Future    Hemoglobin A1c; Future    Recurrent pneumonia    Orders:    Ambulatory Referral to Immunology    XR Chest PA & Lateral; Future    fluticasone-salmeterol (Advair HFA) 115-21 MCG/ACT inhaler; Inhale 2 puffs 2 (Two) Times a Day. Rinse mouth after use    Mild intermittent asthma with acute exacerbation            Orders:    fluticasone-salmeterol (Advair HFA) 115-21 MCG/ACT inhaler; Inhale 2 puffs 2 (Two) Times a Day. Rinse mouth after use    Essential hypertension      Orders:    CBC & Differential; Future    Comprehensive Metabolic Panel; Future    Lipid Panel With / Chol / HDL Ratio; Future    Paroxysmal atrial fibrillation    Orders:    CBC & Differential; Future    Comprehensive Metabolic Panel; Future    Lipid Panel With / Chol / HDL Ratio; Future    Vitamin D  deficiency    Orders:    Vitamin D,25-Hydroxy; Future       Diagnosis Plan   1. Encounter for annual wellness visit (AWV) in Medicare patient        2. Controlled type 2 diabetes mellitus without complication, without long-term current use of insulin  Microalbumin / Creatinine Urine Ratio - Urine, Clean Catch    CBC & Differential    Comprehensive Metabolic Panel    Lipid Panel With / Chol / HDL Ratio    Hemoglobin A1c    Diet controlled, low carb      3. Recurrent pneumonia  Ambulatory Referral to Immunology    XR Chest PA & Lateral    fluticasone-salmeterol (Advair HFA) 115-21 MCG/ACT inhaler    Immunology appointment needed to r/u Immune def and underlying RAD treatment      4. Mild intermittent asthma with acute exacerbation  fluticasone-salmeterol (Advair HFA) 115-21 MCG/ACT inhaler    Start Adviar HFA BID. rinse mouth      5. Essential hypertension  CBC & Differential    Comprehensive Metabolic Panel    Lipid Panel With / Chol / HDL Ratio    Chronic, controlle don Diovan HCTZ 320/12.5mg daily      6. Paroxysmal atrial fibrillation  CBC & Differential    Comprehensive Metabolic Panel    Lipid Panel With / Chol / HDL Ratio    Regular rate and rythmn today      7. Vitamin D deficiency  Vitamin D,25-Hydroxy    On weekly supplement             I spent 45 minutes caring for Debbie on this date of service. This time includes time spent by me in the following activities:preparing for the visit, reviewing tests, obtaining and/or reviewing a separately obtained history, performing a medically appropriate examination and/or evaluation , counseling and educating the patient/family/caregiver, ordering medications, tests, or procedures, referring and communicating with other health care professionals , documenting information in the medical record, and independently interpreting results and communicating that information with the patient/family/caregiver  Follow Up   6 months w labs, refill adviar  Patient was given  instructions and counseling regarding her condition or for health maintenance advice. Please see specific information pulled into the AVS if appropriate.

## 2025-03-04 NOTE — ASSESSMENT & PLAN NOTE
Orders:    fluticasone-salmeterol (Advair HFA) 115-21 MCG/ACT inhaler; Inhale 2 puffs 2 (Two) Times a Day. Rinse mouth after use

## 2025-03-04 NOTE — ASSESSMENT & PLAN NOTE
Orders:    Ambulatory Referral to Immunology    XR Chest PA & Lateral; Future    fluticasone-salmeterol (Advair HFA) 115-21 MCG/ACT inhaler; Inhale 2 puffs 2 (Two) Times a Day. Rinse mouth after use

## 2025-03-05 LAB
25(OH)D3+25(OH)D2 SERPL-MCNC: 55.8 NG/ML (ref 30–100)
ALBUMIN SERPL-MCNC: 4 G/DL (ref 3.5–5.2)
ALBUMIN/CREAT UR: <4 MG/G CREAT (ref 0–29)
ALBUMIN/GLOB SERPL: 1.7 G/DL
ALP SERPL-CCNC: 63 U/L (ref 39–117)
ALT SERPL-CCNC: 23 U/L (ref 1–33)
AST SERPL-CCNC: 22 U/L (ref 1–32)
BASOPHILS # BLD AUTO: 0 10*3/MM3 (ref 0–0.2)
BASOPHILS NFR BLD AUTO: 0 % (ref 0–1.5)
BILIRUB SERPL-MCNC: 1 MG/DL (ref 0–1.2)
BUN SERPL-MCNC: 22 MG/DL (ref 8–23)
BUN/CREAT SERPL: 20.8 (ref 7–25)
CALCIUM SERPL-MCNC: 9.5 MG/DL (ref 8.6–10.5)
CHLORIDE SERPL-SCNC: 104 MMOL/L (ref 98–107)
CHOLEST SERPL-MCNC: 117 MG/DL (ref 0–200)
CHOLEST/HDLC SERPL: 2.34 {RATIO}
CO2 SERPL-SCNC: 24.4 MMOL/L (ref 22–29)
CREAT SERPL-MCNC: 1.06 MG/DL (ref 0.57–1)
CREAT UR-MCNC: 69.3 MG/DL
EGFRCR SERPLBLD CKD-EPI 2021: 54.9 ML/MIN/1.73
EOSINOPHIL # BLD AUTO: 0.03 10*3/MM3 (ref 0–0.4)
EOSINOPHIL NFR BLD AUTO: 0.4 % (ref 0.3–6.2)
ERYTHROCYTE [DISTWIDTH] IN BLOOD BY AUTOMATED COUNT: 12.2 % (ref 12.3–15.4)
GLOBULIN SER CALC-MCNC: 2.3 GM/DL
GLUCOSE SERPL-MCNC: 128 MG/DL (ref 65–99)
HBA1C MFR BLD: 6.7 % (ref 4.8–5.6)
HCT VFR BLD AUTO: 37.3 % (ref 34–46.6)
HDLC SERPL-MCNC: 50 MG/DL (ref 40–60)
HGB BLD-MCNC: 13 G/DL (ref 12–15.9)
IMM GRANULOCYTES # BLD AUTO: 0.04 10*3/MM3 (ref 0–0.05)
IMM GRANULOCYTES NFR BLD AUTO: 0.5 % (ref 0–0.5)
LDLC SERPL CALC-MCNC: 45 MG/DL (ref 0–100)
LYMPHOCYTES # BLD AUTO: 1.26 10*3/MM3 (ref 0.7–3.1)
LYMPHOCYTES NFR BLD AUTO: 16.7 % (ref 19.6–45.3)
MCH RBC QN AUTO: 32.7 PG (ref 26.6–33)
MCHC RBC AUTO-ENTMCNC: 34.9 G/DL (ref 31.5–35.7)
MCV RBC AUTO: 94 FL (ref 79–97)
MICROALBUMIN UR-MCNC: <3 UG/ML
MONOCYTES # BLD AUTO: 0.96 10*3/MM3 (ref 0.1–0.9)
MONOCYTES NFR BLD AUTO: 12.7 % (ref 5–12)
NEUTROPHILS # BLD AUTO: 5.27 10*3/MM3 (ref 1.7–7)
NEUTROPHILS NFR BLD AUTO: 69.7 % (ref 42.7–76)
NRBC BLD AUTO-RTO: 0 /100 WBC (ref 0–0.2)
PLATELET # BLD AUTO: 331 10*3/MM3 (ref 140–450)
POTASSIUM SERPL-SCNC: 3.8 MMOL/L (ref 3.5–5.2)
PROT SERPL-MCNC: 6.3 G/DL (ref 6–8.5)
RBC # BLD AUTO: 3.97 10*6/MM3 (ref 3.77–5.28)
SODIUM SERPL-SCNC: 140 MMOL/L (ref 136–145)
TRIGL SERPL-MCNC: 128 MG/DL (ref 0–150)
VLDLC SERPL CALC-MCNC: 22 MG/DL (ref 5–40)
WBC # BLD AUTO: 7.56 10*3/MM3 (ref 3.4–10.8)

## 2025-03-07 DIAGNOSIS — E11.65 UNCONTROLLED TYPE 2 DIABETES MELLITUS WITH HYPERGLYCEMIA: ICD-10-CM

## 2025-03-07 RX ORDER — BLOOD SUGAR DIAGNOSTIC
STRIP MISCELLANEOUS
Qty: 100 EACH | Refills: 3 | Status: SHIPPED | OUTPATIENT
Start: 2025-03-07

## 2025-03-07 NOTE — TELEPHONE ENCOUNTER
Rx Refill Note  Requested Prescriptions     Pending Prescriptions Disp Refills    glucose blood (OneTouch Ultra) test strip [Pharmacy Med Name: ONE TOUCH ULTRA BLUE TEST STRP] 100 each 3     Sig: USE TO CHECK BLOOD SUGAR DAILY. USE AS INSTRUCTED.(E11.65,KS)      Last office visit with prescribing clinician: 3/4/2025   Last telemedicine visit with prescribing clinician: Visit date not found   Next office visit with prescribing clinician: 9/15/2025                         Would you like a call back once the refill request has been completed: [] Yes [] No    If the office needs to give you a call back, can they leave a voicemail: [] Yes [] No    Nadine Land MA  03/07/25, 15:11 EST

## 2025-03-11 ENCOUNTER — TELEPHONE (OUTPATIENT)
Dept: INTERNAL MEDICINE | Facility: CLINIC | Age: 75
End: 2025-03-11
Payer: MEDICARE

## 2025-03-11 NOTE — TELEPHONE ENCOUNTER
Tried to call patient to get her rescheduled due to here provider being out of the office on 9/51. Was unable to reach and left a voicemail for her to call back.

## 2025-04-07 ENCOUNTER — LAB (OUTPATIENT)
Dept: LAB | Facility: HOSPITAL | Age: 75
End: 2025-04-07
Payer: MEDICARE

## 2025-04-07 ENCOUNTER — HOSPITAL ENCOUNTER (OUTPATIENT)
Dept: GENERAL RADIOLOGY | Facility: HOSPITAL | Age: 75
Discharge: HOME | End: 2025-04-07
Payer: MEDICARE

## 2025-04-07 ENCOUNTER — TRANSCRIBE ORDERS (OUTPATIENT)
Dept: ADMINISTRATIVE | Facility: HOSPITAL | Age: 75
End: 2025-04-07
Payer: MEDICARE

## 2025-04-07 DIAGNOSIS — J18.9 UNRESOLVED PNEUMONIA: ICD-10-CM

## 2025-04-07 DIAGNOSIS — J30.1 ALLERGIC RHINITIS DUE TO POLLEN, UNSPECIFIED SEASONALITY: ICD-10-CM

## 2025-04-07 DIAGNOSIS — J98.9 RESPIRATORY DISORDER: ICD-10-CM

## 2025-04-07 DIAGNOSIS — J98.9 RESPIRATORY DISORDER: Primary | ICD-10-CM

## 2025-04-07 DIAGNOSIS — J98.8 UPPER RESPIRATORY TRACT OBSTRUCTION: ICD-10-CM

## 2025-04-07 DIAGNOSIS — J18.9 UNRESOLVED PNEUMONIA: Primary | ICD-10-CM

## 2025-04-07 DIAGNOSIS — J30.89 PERENNIAL ALLERGIC RHINITIS: ICD-10-CM

## 2025-04-07 LAB
BASOPHILS # BLD AUTO: 0.02 10*3/MM3 (ref 0–0.2)
BASOPHILS NFR BLD AUTO: 0.2 % (ref 0–1.5)
DEPRECATED RDW RBC AUTO: 42.5 FL (ref 37–54)
EOSINOPHIL # BLD AUTO: 0.06 10*3/MM3 (ref 0–0.4)
EOSINOPHIL NFR BLD AUTO: 0.7 % (ref 0.3–6.2)
ERYTHROCYTE [DISTWIDTH] IN BLOOD BY AUTOMATED COUNT: 12.2 % (ref 12.3–15.4)
HCT VFR BLD AUTO: 41.7 % (ref 34–46.6)
HGB BLD-MCNC: 13.6 G/DL (ref 12–15.9)
IGA1 MFR SER: 234 MG/DL (ref 70–400)
IGG1 SER-MCNC: 762 MG/DL (ref 700–1600)
IGM SERPL-MCNC: 44 MG/DL (ref 40–230)
IMM GRANULOCYTES # BLD AUTO: 0.02 10*3/MM3 (ref 0–0.05)
IMM GRANULOCYTES NFR BLD AUTO: 0.2 % (ref 0–0.5)
LYMPHOCYTES # BLD AUTO: 1.15 10*3/MM3 (ref 0.7–3.1)
LYMPHOCYTES NFR BLD AUTO: 13.8 % (ref 19.6–45.3)
MCH RBC QN AUTO: 31.2 PG (ref 26.6–33)
MCHC RBC AUTO-ENTMCNC: 32.6 G/DL (ref 31.5–35.7)
MCV RBC AUTO: 95.6 FL (ref 79–97)
MONOCYTES # BLD AUTO: 0.98 10*3/MM3 (ref 0.1–0.9)
MONOCYTES NFR BLD AUTO: 11.8 % (ref 5–12)
NEUTROPHILS NFR BLD AUTO: 6.11 10*3/MM3 (ref 1.7–7)
NEUTROPHILS NFR BLD AUTO: 73.3 % (ref 42.7–76)
NRBC BLD AUTO-RTO: 0 /100 WBC (ref 0–0.2)
PLATELET # BLD AUTO: 255 10*3/MM3 (ref 140–450)
PMV BLD AUTO: 10.6 FL (ref 6–12)
RBC # BLD AUTO: 4.36 10*6/MM3 (ref 3.77–5.28)
WBC NRBC COR # BLD AUTO: 8.34 10*3/MM3 (ref 3.4–10.8)

## 2025-04-07 PROCEDURE — 86317 IMMUNOASSAY INFECTIOUS AGENT: CPT

## 2025-04-07 PROCEDURE — 71046 X-RAY EXAM CHEST 2 VIEWS: CPT

## 2025-04-07 PROCEDURE — 82784 ASSAY IGA/IGD/IGG/IGM EACH: CPT

## 2025-04-07 PROCEDURE — 36415 COLL VENOUS BLD VENIPUNCTURE: CPT

## 2025-04-07 PROCEDURE — 85025 COMPLETE CBC W/AUTO DIFF WBC: CPT

## 2025-04-08 ENCOUNTER — OFFICE VISIT (OUTPATIENT)
Dept: INTERNAL MEDICINE | Facility: CLINIC | Age: 75
End: 2025-04-08
Payer: MEDICARE

## 2025-04-08 ENCOUNTER — TELEPHONE (OUTPATIENT)
Dept: INTERNAL MEDICINE | Facility: CLINIC | Age: 75
End: 2025-04-08

## 2025-04-08 VITALS
DIASTOLIC BLOOD PRESSURE: 64 MMHG | HEIGHT: 60 IN | BODY MASS INDEX: 31.53 KG/M2 | SYSTOLIC BLOOD PRESSURE: 122 MMHG | HEART RATE: 64 BPM | WEIGHT: 160.6 LBS | TEMPERATURE: 98.2 F | OXYGEN SATURATION: 98 %

## 2025-04-08 DIAGNOSIS — J45.21 MILD INTERMITTENT ASTHMA WITH ACUTE EXACERBATION: ICD-10-CM

## 2025-04-08 DIAGNOSIS — J18.9 RECURRENT PNEUMONIA: Primary | ICD-10-CM

## 2025-04-08 LAB
C DIPHTHERIAE AB SER IA-ACNC: >3 IU/ML
C TETANI TOXOID IGG SERPL IA-ACNC: 0.7 IU/ML

## 2025-04-08 RX ORDER — LEVOFLOXACIN 750 MG/1
750 TABLET, FILM COATED ORAL DAILY
Qty: 5 TABLET | Refills: 0 | Status: SHIPPED | OUTPATIENT
Start: 2025-04-08

## 2025-04-08 RX ORDER — FLUTICASONE PROPIONATE AND SALMETEROL XINAFOATE 115; 21 UG/1; UG/1
2 AEROSOL, METERED RESPIRATORY (INHALATION)
Qty: 12 G | Refills: 0 | Status: SHIPPED | OUTPATIENT
Start: 2025-04-08

## 2025-04-08 NOTE — PROGRESS NOTES
Chief Complaint   Patient presents with    Results     Follow up on chest xray       Subjective     Debbie Cheng is a 74 y.o. female being seen for a follow up appointment today regarding recurring pneumonia. She was in the office 3-4-2025, and reporting illness off and on since 2-, and seen multiple times in officae and ER. She had Flu A, gastroenteritis and atypical pneumonia. She had a bronchoscopy with biopsy 3-8-2024 with Dr Galloway after developing pneumonia last winter and finding a lung mass. Admission (Discharged) with Sunil Hilario MD (03/06/2024) She saw Pulmonary last seen 3-. She was treated again for pneumonia 2- with doxycycline. A CXR for clear was completed yesterday showing LLL pneumonia. She denies SOA, cough, fever and back pain.          History of Present Illness     Allergies   Allergen Reactions    Penicillins Anaphylaxis and Swelling    Erythromycin Other (See Comments)     JOINT PAIN AND SWELLING    Astelin [Azelastine] Cough         Current Outpatient Medications:     fluticasone (FLONASE) 50 MCG/ACT nasal spray, Administer 2 sprays into the nostril(s) as directed by provider Daily. Administer 2 sprays in each nostril for each dose., Disp: , Rfl:     glucose monitor monitoring kit, Use glucose meter to check fasting glucose once daily for diabetes monitoring. E11.9, Disp: 1 each, Rfl: 0    levocetirizine (XYZAL) 5 MG tablet, Take 1 tablet by mouth Every Morning., Disp: , Rfl:     magnesium oxide (MAGOX) 400 (241.3 Mg) MG tablet tablet, Take 250 mg by mouth Every Other Day., Disp: , Rfl:     metoprolol tartrate (LOPRESSOR) 25 MG tablet, TAKE 1 TABLET BY MOUTH TWICE A DAY, Disp: 180 tablet, Rfl: 3    montelukast (SINGULAIR) 10 MG tablet, TAKE 1 TABLET BY MOUTH EVERY DAY AT NIGHT, Disp: 90 tablet, Rfl: 3    multivitamin with minerals tablet tablet, Take 1 tablet by mouth Daily., Disp: , Rfl:     OneTouch Delica Lancets 33G misc, USE ONE LANCET DAILY TO CHECK  FASTING GLUCOSE FOR DIABETES MONITORING. E11.9, Disp: 100 each, Rfl: 12    OneTouch Ultra test strip, USE TO CHECK BLOOD SUGAR DAILY. USE AS INSTRUCTED.(E11.65,KS), Disp: 100 each, Rfl: 3    rivaroxaban (Xarelto) 20 MG tablet, TAKE 1 TABLET BY MOUTH EVERY DAY, Disp: 90 tablet, Rfl: 1    rosuvastatin (CRESTOR) 20 MG tablet, TAKE 1 TABLET BY MOUTH EVERY DAY (STOP SIMVASTATIN), Disp: 90 tablet, Rfl: 1    valsartan-hydrochlorothiazide (DIOVAN-HCT) 320-12.5 MG per tablet, TAKE 1 TABLET BY MOUTH EVERY DAY, Disp: 90 tablet, Rfl: 1    VENTOLIN  (90 Base) MCG/ACT inhaler, 2 puffs Every 4 (Four) Hours As Needed for Shortness of Air or Wheezing., Disp: , Rfl: 3    vitamin D (ERGOCALCIFEROL) 1.25 MG (04033 UT) capsule capsule, TAKE 1 CAPSULE BY MOUTH ONE TIME PER WEEK, Disp: 12 capsule, Rfl: 3    fluticasone-salmeterol (Advair HFA) 115-21 MCG/ACT inhaler, Inhale 2 puffs 2 (Two) Times a Day. Rinse mouth after use, Disp: 12 g, Rfl: 0    promethazine-dextromethorphan (PROMETHAZINE-DM) 6.25-15 MG/5ML syrup, Take 5 mL by mouth Every 6 (Six) Hours As Needed for Cough., Disp: 100 mL, Rfl: 0    The following portions of the patient's history were reviewed and updated as appropriate: allergies, current medications, past family history, past medical history, past social history, past surgical history, and problem list.    Review of Systems   Constitutional: Negative.    HENT: Negative.     Respiratory: Negative.     Cardiovascular: Negative.  Negative for chest pain, palpitations and leg swelling.   Musculoskeletal: Negative.    Neurological: Negative.    Hematological: Negative.    Psychiatric/Behavioral: Negative.     All other systems reviewed and are negative.      Assessment     Physical Exam  Vitals reviewed.   Constitutional:       Appearance: Normal appearance. She is not ill-appearing.   Cardiovascular:      Rate and Rhythm: Normal rate and regular rhythm.      Pulses: Normal pulses.      Heart sounds: No murmur  heard.  Pulmonary:      Effort: Pulmonary effort is normal. No respiratory distress.      Breath sounds: Normal breath sounds. No stridor. No wheezing or rhonchi.   Musculoskeletal:      Cervical back: Neck supple.   Neurological:      Mental Status: She is alert and oriented to person, place, and time.   Psychiatric:         Mood and Affect: Mood normal.         Behavior: Behavior normal.         Thought Content: Thought content normal.         Plan     Her CXR was reviewed with the patient from:    XR Chest PA & Lateral (04/07/2025 13:48)   CT Chest Without Contrast Diagnostic (04/17/2024 07:59)   Requested records from Immunology.     Diagnoses and all orders for this visit:    1. Recurrent pneumonia (Primary)  -     Ambulatory Referral to Pulmonology  -     levoFLOXacin (Levaquin) 750 MG tablet; Take 1 tablet by mouth Daily.  Dispense: 5 tablet; Refill: 0    2. Mild intermittent asthma with acute exacerbation  Comments:  Start Adviar HFA BID. rinse mouth  Orders:  -     fluticasone-salmeterol (Advair HFA) 115-21 MCG/ACT inhaler; Inhale 2 puffs 2 (Two) Times a Day. Rinse mouth after use  Dispense: 12 g; Refill: 0        Hold magnesium and MVI while on Levaquin. Pending Immune testing with immunology due to recurrent pneumonia.     Follow up with pulmonary, referral placed.

## 2025-04-08 NOTE — TELEPHONE ENCOUNTER
Caller: Debbie Cheng    Relationship: Self    Best call back number: [phone]     What is the best time to reach you: ANYTIME    Who are you requesting to speak with (clinical staff, provider,  specific staff member): CLINICAL    What was the call regarding: PATIENT WOULD LIKE FOR ESTEBAN MOORE TO GO OVER HER MOST RECENT LAB RESULTS AND CALL TO DISCUSS THEM WITH PATIENT.     PLEASE CALL TO ADVISE.     Is it okay if the provider responds through [employer] [facility]: WOULD RATHER HAVE PHONE CALL DUE TO ISSUES GETTING INTO [employer]

## 2025-04-10 DIAGNOSIS — J18.9 RECURRENT PNEUMONIA: Primary | ICD-10-CM

## 2025-04-10 NOTE — TELEPHONE ENCOUNTER
Name: Debbie Cheng      Relationship: Self      Best Callback Number: [phone]      HUB PROVIDED THE RELAY MESSAGE FROM THE OFFICE      PATIENT: VOICED UNDERSTANDING AND HAS NO FURTHER QUESTIONS AT THIS TIME    ADDITIONAL INFORMATION: PATIENT WAS CHECK ON LAST CBC AND WANTED TO SEE IF ESTEBAN MYERS CAN SEE THE LABS THE DR HAIRSTON ALLERGY DOCTOR HAD COMPLETED

## 2025-04-11 DIAGNOSIS — E11.9 CONTROLLED TYPE 2 DIABETES MELLITUS WITHOUT COMPLICATION, WITHOUT LONG-TERM CURRENT USE OF INSULIN: ICD-10-CM

## 2025-04-11 DIAGNOSIS — I10 ESSENTIAL HYPERTENSION: ICD-10-CM

## 2025-04-11 DIAGNOSIS — E55.9 VITAMIN D DEFICIENCY: ICD-10-CM

## 2025-04-11 DIAGNOSIS — J18.9 RECURRENT PNEUMONIA: ICD-10-CM

## 2025-04-11 DIAGNOSIS — I48.0 PAROXYSMAL ATRIAL FIBRILLATION: ICD-10-CM

## 2025-04-11 LAB
S PN DA SERO 19F IGG SER IA-MCNC: 7.7 UG/ML
S PNEUM DA 1 IGG SER IA-MCNC: 1.4 UG/ML
S PNEUM DA 10A IGG SER IA-MCNC: 7.7 UG/ML
S PNEUM DA 11A IGG SER IA-MCNC: 2.4 UG/ML
S PNEUM DA 12F IGG SER IA-MCNC: 0.2 UG/ML
S PNEUM DA 14 IGG SER IA-MCNC: 11.6 UG/ML
S PNEUM DA 15B IGG SER IA-MCNC: 2.3 UG/ML
S PNEUM DA 17F IGG SER IA-MCNC: 1.4 UG/ML
S PNEUM DA 18C IGG SER IA-MCNC: 4.8 UG/ML
S PNEUM DA 19A IGG SER IA-MCNC: 7.8 UG/ML
S PNEUM DA 2 IGG SER IA-MCNC: 0.7 UG/ML
S PNEUM DA 20A IGG SER IA-MCNC: 4 UG/ML
S PNEUM DA 22F IGG SER IA-MCNC: 1 UG/ML
S PNEUM DA 23F IGG SER IA-MCNC: >31.2 UG/ML
S PNEUM DA 3 IGG SER IA-MCNC: 0.9 UG/ML
S PNEUM DA 33F IGG SER IA-MCNC: 8.7 UG/ML
S PNEUM DA 4 IGG SER IA-MCNC: 1.4 UG/ML
S PNEUM DA 5 IGG SER IA-MCNC: 10.6 UG/ML
S PNEUM DA 6B IGG SER IA-MCNC: 0.4 UG/ML
S PNEUM DA 7F IGG SER IA-MCNC: 3.8 UG/ML
S PNEUM DA 8 IGG SER IA-MCNC: 19.6 UG/ML
S PNEUM DA 9N IGG SER IA-MCNC: 3.2 UG/ML
S PNEUM DA 9V IGG SER IA-MCNC: 8.2 UG/ML

## 2025-04-11 NOTE — TELEPHONE ENCOUNTER
Hub staff attempted to follow warm transfer process and was unsuccessful     Caller: Debbie Cheng    Relationship to patient: Self    Best call back number: [phone]     Patient is needing: UNABLE TO TRANSFER TO OFFICE TO SCHEDULE LABS. PLEASE CALL

## 2025-04-11 NOTE — TELEPHONE ENCOUNTER
I placed some new lab orders for her (2 separate) because one was a smart set loaded. I would like for her to get these before her pulmonary visit to assess for other causes of recurrent pneumonia. These do not need to be fasting. See orders and schedule her please.

## 2025-04-11 NOTE — TELEPHONE ENCOUNTER
"Relay     \"Terra has placed some new lab orders for her (2 separate) because one was a smart set loaded. I would like for her to get these before her pulmonary visit to assess for other causes of recurrent pneumonia. These do not need to be fasting. See orders and schedule her please. \"        "

## 2025-04-16 LAB
25(OH)D3+25(OH)D2 SERPL-MCNC: 66.6 NG/ML (ref 30–100)
ACE SERPL-CCNC: 42 U/L (ref 14–82)
ALBUMIN SERPL-MCNC: 4 G/DL (ref 3.8–4.8)
ALDOLASE SERPL-CCNC: 4.2 U/L (ref 3.3–10.3)
ALP SERPL-CCNC: 66 IU/L (ref 44–121)
ALT SERPL-CCNC: 20 IU/L (ref 0–32)
ANA SER QL: NEGATIVE
AST SERPL-CCNC: 28 IU/L (ref 0–40)
BASOPHILS # BLD AUTO: 0 X10E3/UL (ref 0–0.2)
BASOPHILS NFR BLD AUTO: 0 %
BILIRUB SERPL-MCNC: 0.7 MG/DL (ref 0–1.2)
BUN SERPL-MCNC: 22 MG/DL (ref 8–27)
BUN/CREAT SERPL: 22 (ref 12–28)
C3 SERPL-MCNC: 133 MG/DL (ref 82–167)
C4 SERPL-MCNC: 27 MG/DL (ref 12–38)
CALCIUM SERPL-MCNC: 9 MG/DL (ref 8.7–10.3)
CCP IGA+IGG SERPL IA-ACNC: 9 UNITS (ref 0–19)
CHLORIDE SERPL-SCNC: 103 MMOL/L (ref 96–106)
CHOLEST SERPL-MCNC: 115 MG/DL (ref 100–199)
CHOLEST/HDLC SERPL: 2.1 RATIO (ref 0–4.4)
CK SERPL-CCNC: 245 U/L (ref 32–182)
CO2 SERPL-SCNC: 22 MMOL/L (ref 20–29)
CORTIS SERPL-MCNC: 8.9 UG/DL (ref 6.2–19.4)
CREAT SERPL-MCNC: 1.01 MG/DL (ref 0.57–1)
CRP SERPL-MCNC: 4 MG/L (ref 0–10)
DSDNA AB SER-ACNC: 1 IU/ML (ref 0–9)
EGFRCR SERPLBLD CKD-EPI 2021: 58 ML/MIN/1.73
EOSINOPHIL # BLD AUTO: 0 X10E3/UL (ref 0–0.4)
EOSINOPHIL NFR BLD AUTO: 1 %
ERYTHROCYTE [DISTWIDTH] IN BLOOD BY AUTOMATED COUNT: 12.4 % (ref 11.7–15.4)
ERYTHROCYTE [SEDIMENTATION RATE] IN BLOOD BY WESTERGREN METHOD: 17 MM/HR (ref 0–40)
GAMMA INTERFERON BACKGROUND BLD IA-ACNC: 0.02 IU/ML
GLOBULIN SER CALC-MCNC: 2.1 G/DL (ref 1.5–4.5)
GLUCOSE SERPL-MCNC: 111 MG/DL (ref 70–99)
HBA1C MFR BLD: 6.7 % (ref 4.8–5.6)
HCT VFR BLD AUTO: 38.7 % (ref 34–46.6)
HDLC SERPL-MCNC: 56 MG/DL
HGB BLD-MCNC: 12.7 G/DL (ref 11.1–15.9)
HIV 1+2 AB+HIV1 P24 AG SERPL QL IA: NON REACTIVE
IMM GRANULOCYTES # BLD AUTO: 0 X10E3/UL (ref 0–0.1)
IMM GRANULOCYTES NFR BLD AUTO: 0 %
LDLC SERPL CALC-MCNC: 41 MG/DL (ref 0–99)
LYMPHOCYTES # BLD AUTO: 1.2 X10E3/UL (ref 0.7–3.1)
LYMPHOCYTES NFR BLD AUTO: 22 %
M TB IFN-G BLD-IMP: NEGATIVE
M TB IFN-G CD4+ BCKGRND COR BLD-ACNC: 0.04 IU/ML
M TB IFN-G CD4+CD8+ BCKGRND COR BLD-ACNC: 0.04 IU/ML
MCH RBC QN AUTO: 31.3 PG (ref 26.6–33)
MCHC RBC AUTO-ENTMCNC: 32.8 G/DL (ref 31.5–35.7)
MCV RBC AUTO: 95 FL (ref 79–97)
MITOGEN IGNF BCKGRD COR BLD-ACNC: 9.08 IU/ML
MONOCYTES # BLD AUTO: 0.7 X10E3/UL (ref 0.1–0.9)
MONOCYTES NFR BLD AUTO: 13 %
NEUTROPHILS # BLD AUTO: 3.4 X10E3/UL (ref 1.4–7)
NEUTROPHILS NFR BLD AUTO: 64 %
PLATELET # BLD AUTO: 270 X10E3/UL (ref 150–450)
POTASSIUM SERPL-SCNC: 4 MMOL/L (ref 3.5–5.2)
PROT SERPL-MCNC: 6.1 G/DL (ref 6–8.5)
QUANTIFERON INCUBATION: NORMAL
RBC # BLD AUTO: 4.06 X10E6/UL (ref 3.77–5.28)
RHEUMATOID FACT SERPL-ACNC: <10 IU/ML
SERVICE CMNT-IMP: NORMAL
SODIUM SERPL-SCNC: 141 MMOL/L (ref 134–144)
TRIGL SERPL-MCNC: 97 MG/DL (ref 0–149)
VLDLC SERPL CALC-MCNC: 18 MG/DL (ref 5–40)
WBC # BLD AUTO: 5.3 X10E3/UL (ref 3.4–10.8)

## 2025-05-01 ENCOUNTER — OFFICE VISIT (OUTPATIENT)
Age: 75
End: 2025-05-01
Payer: MEDICARE

## 2025-05-01 VITALS
HEIGHT: 60 IN | BODY MASS INDEX: 31.61 KG/M2 | WEIGHT: 161 LBS | OXYGEN SATURATION: 93 % | DIASTOLIC BLOOD PRESSURE: 80 MMHG | SYSTOLIC BLOOD PRESSURE: 130 MMHG

## 2025-05-01 DIAGNOSIS — I49.1 PREMATURE ATRIAL COMPLEXES: ICD-10-CM

## 2025-05-01 DIAGNOSIS — I10 ESSENTIAL HYPERTENSION: ICD-10-CM

## 2025-05-01 DIAGNOSIS — I49.3 PVCS (PREMATURE VENTRICULAR CONTRACTIONS): ICD-10-CM

## 2025-05-01 DIAGNOSIS — I48.0 PAROXYSMAL ATRIAL FIBRILLATION: Primary | ICD-10-CM

## 2025-05-01 PROCEDURE — 1159F MED LIST DOCD IN RCRD: CPT | Performed by: PHYSICIAN ASSISTANT

## 2025-05-01 PROCEDURE — 93000 ELECTROCARDIOGRAM COMPLETE: CPT | Performed by: PHYSICIAN ASSISTANT

## 2025-05-01 PROCEDURE — 3079F DIAST BP 80-89 MM HG: CPT | Performed by: PHYSICIAN ASSISTANT

## 2025-05-01 PROCEDURE — 1160F RVW MEDS BY RX/DR IN RCRD: CPT | Performed by: PHYSICIAN ASSISTANT

## 2025-05-01 PROCEDURE — 3075F SYST BP GE 130 - 139MM HG: CPT | Performed by: PHYSICIAN ASSISTANT

## 2025-05-01 PROCEDURE — 99214 OFFICE O/P EST MOD 30 MIN: CPT | Performed by: PHYSICIAN ASSISTANT

## 2025-05-01 NOTE — PROGRESS NOTES
CARDIOLOGY        Patient Name: Debbie Cheng  :1950  Age: 74 y.o.  Primary Cardiologist: Keon Mann MD  Encounter Provider:  Sergey Flores PA-C    Date of Service: 25            CHIEF COMPLAINT / REASON FOR OFFICE VISIT     Cardiac Clearance      HISTORY OF PRESENT ILLNESS       HPI  Debbie Cheng is a 74 y.o. female who presents today for cardiac clearance.     Pt has a  history significant for paroxysmal atrial fibrillation, PACs, PVCs, and hypertension presents for cardiac clearance.  Patient was last seen in office on 12/3/2024 where she denied any chest pain or dyspnea.  She had no pedal edema orthopnea.  Her palpitations were stable and mild.  She had no syncope.  She was to remain on her anticoagulation.  No no medication changes.    Patient has been doing well since her last office visit.  She was to have right knee surgery by Dr. Kelly on May 28 and is here for clearance.  She has not had any chest pain, chest pressure, new palpitations, or fatigue.  She was exercising twice a week along with walking without issues.  She did have pneumonia back in  and has recovered fine.  Patient has not had any bleeding issues being on Xarelto.      Below is a brief summary of patient's pertinent cardiac history  She is an extremely pleasant lady who had one episode of highly symptomatic atrial fibrillation in May 2015. She converted on her own. She was started on rivaroxaban and metoprolol. In 2015, Dr. Mann stopped the NOAC when it was clear that she had experienced no recurrence.      In 2021, she reported palpitations that sounded like premature ectopics; a Holter showed a 4% burden of of PACs and a 4% burden of PVCs. An echo showed normal LV size and systolic function, mild-moderate LAE, and mild-moderate MR.      In , she reported an increased frequency of palpitations; they were not consistent with her previous PAF. She felt skips and a pause. She went to the  "ED, and was noted to have PVCs. She was reassured; her symptoms have improved. She was told that her \"heart was enlarged.\" Dr. Mann reviewed her CXR and did not appreciate significant cardiomegaly.     The following portions of the patient's history were reviewed and updated as appropriate: allergies, current medications, past family history, past medical history, past social history, past surgical history and problem list.      VITAL SIGNS     Visit Vitals  /80 (BP Location: Left arm, Patient Position: Sitting, Cuff Size: Adult)   Ht 152.4 cm (60\")   Wt 73 kg (161 lb)   LMP  (LMP Unknown)   SpO2 93%   BMI 31.44 kg/m²       @[email or web address]  Wt Readings from Last 3 Encounters:   05/01/25 73 kg (161 lb)   04/08/25 72.8 kg (160 lb 9.6 oz)   03/04/25 71.3 kg (157 lb 3.2 oz)     Body mass index is 31.44 kg/m².        PHYSICAL EXAMINATION     Constitutional:       General: Awake.      Appearance: Not in distress.   Pulmonary:      Effort: Pulmonary effort is normal.      Breath sounds: Normal breath sounds.   Cardiovascular:      Normal rate. Occasional ectopic beats. Regular rhythm.      Murmurs: There is no murmur.   Skin:     General: Skin is warm.   Neurological:      Mental Status: Alert.   Psychiatric:         Behavior: Behavior is cooperative.           REVIEWED DATA       ECG 12 Lead    Date/Time: 5/1/2025 8:39 AM  Performed by: Sergey Flores PA-C    Authorized by: Sergey Flores PA-C  Comparison: compared with previous ECG   Similar to previous ECG  Rhythm: sinus rhythm  Ectopy: atrial premature contractions  Rate: normal  BPM: 65  QRS axis: normal    Clinical impression: non-specific ECG          Cardiac Procedures:    Vascular screening on 5-10-24    Carotid Artery Disease and Stroke Screening: The right carotid artery has plaque without stenosis. The left carotid artery is normal.    Abdominal Aortic Aneurysm (AAA) Screening: Maximum proximal diameter of 1.95 cm. The artery was " normal.    Peripheral Artery Disease (P.A.D.) Screening: The right has normal arterial pressures. The left has normal arterial pressures.    Transthoracic echo on 7/22/2021  Calculated left ventricular EF = 56% Estimated left ventricular EF was in agreement with the calculated left ventricular EF. Left ventricular systolic function is normal.  Normal right ventricular cavity size and systolic function noted.  The left atrial cavity is mild to moderately dilated.  Mild to moderate mitral valve regurgitation is present.  Mild tricuspid valve regurgitation is present.  Calculated right ventricular systolic pressure from tricuspid regurgitation is 37 mmHg.  There is no evidence of pericardial effusion     Holter on 7/26/2021  An abnormal monitor study.  Predominant underlying rhythm is sinus with average heart rate of 73 bpm.  Total PVC burden of 4.4% reported that includes triplets/couplets/bigeminy/trigeminy. No ventricular runs noted.  Total PAC burden of 3.9% reported. 21 nonsustained atrial runs with the longest of 18 beats reported.  Total atrial fibrillation load of 0.3% reported. Patient was in A. fib intermittently between 4-5 PM on day 1.  Only one diary submitted with patient complaining of flutter that correlates with an episode of PVC.       Lipid Panel          8/19/2024    08:47 3/4/2025    11:01 4/14/2025    10:56   Lipid Panel   Total Cholesterol 155  117  115    Triglycerides 79  128  97    HDL Cholesterol 68  50  56    VLDL Cholesterol 15  22  18    LDL Cholesterol  72  45  41        Lab Results   Component Value Date     04/14/2025     03/04/2025    K 4.0 04/14/2025    K 3.8 03/04/2025     04/14/2025     03/04/2025    CO2 22 04/14/2025    CO2 24.4 03/04/2025    BUN 22 04/14/2025    BUN 22 03/04/2025    CREATININE 1.01 (H) 04/14/2025    CREATININE 1.06 (H) 03/04/2025    EGFRIFNONA 60 (L) 09/08/2021    EGFRIFNONA 70 02/23/2021    EGFRIFAFRI 73 09/08/2021    EGFRIFAFRI 84  "02/23/2021    GLUCOSE 111 (H) 04/14/2025    GLUCOSE 128 (H) 03/04/2025    CALCIUM 9.0 04/14/2025    CALCIUM 9.5 03/04/2025    ALBUMIN 4.0 04/14/2025    ALBUMIN 4.0 03/04/2025    BILITOT 0.7 04/14/2025    BILITOT 1.0 03/04/2025    AST 28 04/14/2025    AST 22 03/04/2025    ALT 20 04/14/2025    ALT 23 03/04/2025     Lab Results   Component Value Date    WBC 5.3 04/14/2025    WBC 8.34 04/07/2025    HGB 12.7 04/14/2025    HGB 13.6 04/07/2025    HCT 38.7 04/14/2025    HCT 41.7 04/07/2025    MCV 95 04/14/2025    MCV 95.6 04/07/2025     04/14/2025     04/07/2025     No results found for: \"PROBNP\", \"BNP\"  Lab Results   Component Value Date    CKTOTAL 245 (H) 04/14/2025    TROPONINT 20 (H) 03/07/2024     Lab Results   Component Value Date    TSH 6.660 (H) 07/06/2017    TSH 4.350 (H) 03/14/2017             ASSESSMENT & PLAN     Diagnoses and all orders for this visit:    1. Paroxysmal atrial fibrillation (Primary)    2. Premature atrial complexes    3. PVCs (premature ventricular contractions)    4. Essential hypertension    5. Surgical clearance      1.  She had one highly symptomatic AF episode and has had no evidence of recurrence.  She will remain on metoprolol.  As her left atrium is dilated and her CHADSVASC is 3, I have recommended she remain on AC.      2/3.  A monitor in 2021 showed a 4% burden (H) of premature atrial and premature ventricular contractions.  Her palpitations are consistent with PVCs.  She can definitely tell the difference between these and atrial fibrillation.  If these were to happen again, she could take an extra dose of metoprolol or magnesium, and I encouraged her to get up and move around, as increasing her heart rate will often make them go away.  We talked about the generally benign nature of these.     4. Her BP is well controlled.     5.  Per Martin's criteria patient's low risk for any adverse outcome from noncardiac surgery.  She will hold her xarelto for 2 days and resume it 24 " hours post procedure.      Patient overall is doing well.  Her exercise capacity is good.  Patient given a clearance letter for her upcoming right knee surgery.  She is doing consultation with epidural for procedure.  She understands how to hold her Xarelto.  I will have her follow-up with her scheduled appoint with Dr. Mann in December.    Return for Dr. Mann, Next scheduled follow up.    Future Appointments         Provider Department Center    9/8/2025 8:30 AM [facility]    9/16/2025 9:30 AM Terra Reddy APRN [facility] PRIMARY CARE LAG    12/9/2025 10:00 AM Keon Mann MD [facility] CARDIOLOGY LAG    12/22/2025 8:00 AM [facility] 1 [facility] MAMMOGRAPHY LAG                MEDICATIONS         Discharge Medications            Accurate as of May 1, 2025  9:07 AM. If you have any questions, ask your nurse or doctor.                Continue These Medications        Instructions Start Date   fluticasone 50 MCG/ACT nasal spray  Commonly known as: FLONASE   2 sprays, Daily      glucose monitor monitoring kit   Use glucose meter to check fasting glucose once daily for diabetes monitoring. E11.9      levocetirizine 5 MG tablet  Commonly known as: XYZAL   5 mg, Every Morning      magnesium oxide 400 (241.3 Mg) MG tablet tablet  Commonly known as: MAGOX   250 mg, Every Other Day      metoprolol tartrate 25 MG tablet  Commonly known as: LOPRESSOR   TAKE 1 TABLET BY MOUTH TWICE A DAY      montelukast 10 MG tablet  Commonly known as: SINGULAIR   TAKE 1 TABLET BY MOUTH EVERY DAY AT NIGHT      multivitamin with minerals tablet tablet   1 tablet, Daily      OneTouch Delica Lancets 33G misc   USE ONE LANCET DAILY TO CHECK FASTING GLUCOSE FOR DIABETES MONITORING. E11.9      OneTouch Ultra test strip  Generic drug: glucose blood   USE TO CHECK BLOOD SUGAR DAILY. USE AS INSTRUCTED.(E11.65,KS)      rosuvastatin 20 MG  tablet  Commonly known as: CRESTOR   TAKE 1 TABLET BY MOUTH EVERY DAY (STOP SIMVASTATIN)      valsartan-hydrochlorothiazide 320-12.5 MG per tablet  Commonly known as: DIOVAN-HCT   1 tablet, Oral, Daily      vitamin D 1.25 MG (46427 UT) capsule capsule  Commonly known as: ERGOCALCIFEROL   TAKE 1 CAPSULE BY MOUTH ONE TIME PER WEEK      Xarelto 20 MG tablet  Generic drug: rivaroxaban   20 mg, Oral, Daily                   **Dragon Disclaimer:   Much of this encounter note is an electronic transcription/translation of spoken language to printed text. The electronic translation of spoken language may permit erroneous, or at times, nonsensical words or phrases to be inadvertently transcribed. Although I have reviewed the note for such errors, some may still exist.

## 2025-05-01 NOTE — LETTER
May 1, 2025    Debbie Cheng  [address] [place] [address]      CARDIOLOGY    Patient Name: Debbie Cheng  :1950  Age: 74 y.o.    25    To whom it may concern:    Debbie Cheng was referred to my office for cardiovascular risk assessment exam for upcoming total right knee arthroplasty with Dr. Kelly on 25.    From a cardiovascular standpoint, the patient is at the following risk of major cardiovascular event in the kumar-operative setting:  [x] Low         [] Modifiable  [] Low-Moderate       [] Non-Modifiable   [] Moderate  [] Moderare - high  [] High    Cardiac Testing:  [] Needs further cardiac testing  [x] Does not need further cardiac testing    Anticoagulant Status:  [] No anticoagulants    [x] The patient is on  [] ASA; hold for ___ days.  [] Coumadin; hold for ___ days.  [] Plavix; hold for ___ days.  [] Eliquis; hold for ___ days.  [] Brilinta; hold for ___ days.  [x] Xarelto; hold for 2 days.  [] Effient; hold for ___ days.    [] The patient requires Lovenox bridging, which has been prescribed.       If there are any problems during surgery, please do not hesitate to contact my office.    Thank you for allowing me to participate in this patient's care.    Sincerely,         Sergey Flores PA-C  [facility]   [facility]  [address] [place] [id number]  Office: [phone]                                 Sergey Flores PA-C

## 2025-05-01 NOTE — LETTER
CARDIOLOGY    Patient Name: Debbie Cheng  :1950  Age: 74 y.o.    25    To whom it may concern:    Debbie Cheng was referred to my office for cardiovascular risk assessment exam for upcoming right knee arthroplasty with Dr. Kelly on 25.    According to current ACC/AHA perioperative risk management guidelines, endoscopies and procedures performed without general anesthesia are considered to be low risk procedures that do not require further cardiovascular risk stratification.       Anticoagulant Status:  [] No anticoagulants    [x] The patient is on  [] ASA; hold for ___ days.  [] Coumadin; hold for ___ days.  [] Plavix; hold for ___ days.  [] Eliquis; hold for ___ days.  [] Brilinta; hold for ___ days.  [x] Xarelto; hold for 2 days.  [] Effient; hold for ___ days.    [] The patient requires Lovenox bridging, which has been prescribed.     Sincerely,         Sergey Flores PA-C  [facility]   [facility]  [address] [place] [address] - [address]

## 2025-05-12 DIAGNOSIS — J45.21 MILD INTERMITTENT ASTHMA WITH ACUTE EXACERBATION: ICD-10-CM

## 2025-05-12 RX ORDER — FLUTICASONE PROPIONATE AND SALMETEROL XINAFOATE 115; 21 UG/1; UG/1
AEROSOL, METERED RESPIRATORY (INHALATION)
Qty: 12 EACH | Refills: 0 | Status: SHIPPED | OUTPATIENT
Start: 2025-05-12

## 2025-05-12 NOTE — TELEPHONE ENCOUNTER
The original prescription was discontinued on 5/1/2025 by Sergey Flores PA-C for the following reason: Historical Med - Therapy completed       Rx Refill Note  Requested Prescriptions     Pending Prescriptions Disp Refills    fluticasone-salmeterol (ADVAIR HFA) 115-21 MCG/ACT inhaler [Pharmacy Med Name: FLUTICASONE-SALMETEROL 115-21] 12 each      Sig: INHALE 2 PUFFS 2 TIMES A DAY. RINSE MOUTH AFTER USE      Last office visit with prescribing clinician: 4/8/2025   Last telemedicine visit with prescribing clinician: Visit date not found   Next office visit with prescribing clinician: 9/16/2025                         Would you like a call back once the refill request has been completed: [] Yes [] No    If the office needs to give you a call back, can they leave a voicemail: [] Yes [] No    Sherrill Esparza MA  05/12/25, 08:17 EDT

## 2025-07-11 DIAGNOSIS — I48.0 PAROXYSMAL ATRIAL FIBRILLATION: ICD-10-CM

## 2025-08-01 RX ORDER — ROSUVASTATIN CALCIUM 20 MG/1
TABLET, COATED ORAL
Qty: 90 TABLET | Refills: 1 | Status: SHIPPED | OUTPATIENT
Start: 2025-08-01

## 2025-08-01 NOTE — TELEPHONE ENCOUNTER
Rx Refill Note  Requested Prescriptions     Pending Prescriptions Disp Refills    rosuvastatin (CRESTOR) 20 MG tablet [Pharmacy Med Name: ROSUVASTATIN CALCIUM 20 MG TAB] 90 tablet 1     Sig: TAKE 1 TABLET BY MOUTH EVERY DAY (STOP SIMVASTATIN)      Last office visit with prescribing clinician: 4/8/2025   Last telemedicine visit with prescribing clinician: Visit date not found   Next office visit with prescribing clinician: 9/16/2025                         Would you like a call back once the refill request has been completed: [] Yes [] No    If the office needs to give you a call back, can they leave a voicemail: [] Yes [] No    Sherrill Esparza MA  08/01/25, 10:41 EDT

## 2025-08-06 ENCOUNTER — OFFICE VISIT (OUTPATIENT)
Dept: INTERNAL MEDICINE | Facility: CLINIC | Age: 75
End: 2025-08-06
Payer: MEDICARE

## 2025-08-06 VITALS
SYSTOLIC BLOOD PRESSURE: 126 MMHG | HEIGHT: 60 IN | HEART RATE: 69 BPM | BODY MASS INDEX: 30.9 KG/M2 | WEIGHT: 157.4 LBS | OXYGEN SATURATION: 96 % | DIASTOLIC BLOOD PRESSURE: 62 MMHG
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
## 2025-08-06 DIAGNOSIS — S30.860A TICK BITE OF BACK, INITIAL ENCOUNTER: ICD-10-CM

## 2025-08-06 DIAGNOSIS — E11.9 CONTROLLED TYPE 2 DIABETES MELLITUS WITHOUT COMPLICATION, WITHOUT LONG-TERM CURRENT USE OF INSULIN: Primary | ICD-10-CM

## 2025-08-06 DIAGNOSIS — R53.82 CHRONIC FATIGUE: ICD-10-CM

## 2025-08-06 RX ORDER — FLUTICASONE PROPIONATE 50 MCG
2 SPRAY, SUSPENSION (ML) NASAL DAILY
Qty: 18.2 G | Refills: 6 | Status: SHIPPED | OUTPATIENT
Start: 2025-08-06

## 2025-08-07 LAB
ALBUMIN SERPL-MCNC: 4.5 G/DL (ref 3.8–4.8)
ALP SERPL-CCNC: 71 IU/L (ref 44–121)
ALT SERPL-CCNC: 15 IU/L (ref 0–32)
AST SERPL-CCNC: 19 IU/L (ref 0–40)
B BURGDOR IGG+IGM SER QL IA: NEGATIVE
BASOPHILS # BLD AUTO: 0 X10E3/UL (ref 0–0.2)
BASOPHILS NFR BLD AUTO: 0 %
BILIRUB SERPL-MCNC: 0.9 MG/DL (ref 0–1.2)
BUN SERPL-MCNC: 21 MG/DL (ref 8–27)
BUN/CREAT SERPL: 23 (ref 12–28)
CALCIUM SERPL-MCNC: 9.6 MG/DL (ref 8.7–10.3)
CHLORIDE SERPL-SCNC: 102 MMOL/L (ref 96–106)
CO2 SERPL-SCNC: 21 MMOL/L (ref 20–29)
CREAT SERPL-MCNC: 0.92 MG/DL (ref 0.57–1)
EGFRCR SERPLBLD CKD-EPI 2021: 65 ML/MIN/1.73
EOSINOPHIL # BLD AUTO: 0.1 X10E3/UL (ref 0–0.4)
EOSINOPHIL NFR BLD AUTO: 2 %
ERYTHROCYTE [DISTWIDTH] IN BLOOD BY AUTOMATED COUNT: 13.1 % (ref 11.7–15.4)
GLOBULIN SER CALC-MCNC: 2.3 G/DL (ref 1.5–4.5)
GLUCOSE SERPL-MCNC: 124 MG/DL (ref 70–99)
HBA1C MFR BLD: 6.5 % (ref 4.8–5.6)
HCT VFR BLD AUTO: 40.7 % (ref 34–46.6)
HGB BLD-MCNC: 12.9 G/DL (ref 11.1–15.9)
IMM GRANULOCYTES # BLD AUTO: 0 X10E3/UL (ref 0–0.1)
IMM GRANULOCYTES NFR BLD AUTO: 0 %
LYMPHOCYTES # BLD AUTO: 1.1 X10E3/UL (ref 0.7–3.1)
LYMPHOCYTES NFR BLD AUTO: 23 %
MCH RBC QN AUTO: 29.9 PG (ref 26.6–33)
MCHC RBC AUTO-ENTMCNC: 31.7 G/DL (ref 31.5–35.7)
MCV RBC AUTO: 94 FL (ref 79–97)
MONOCYTES # BLD AUTO: 0.8 X10E3/UL (ref 0.1–0.9)
MONOCYTES NFR BLD AUTO: 17 %
NEUTROPHILS # BLD AUTO: 2.7 X10E3/UL (ref 1.4–7)
NEUTROPHILS NFR BLD AUTO: 58 %
PLATELET # BLD AUTO: 271 X10E3/UL (ref 150–450)
POTASSIUM SERPL-SCNC: 4.2 MMOL/L (ref 3.5–5.2)
PROT SERPL-MCNC: 6.8 G/DL (ref 6–8.5)
RBC # BLD AUTO: 4.31 X10E6/UL (ref 3.77–5.28)
SODIUM SERPL-SCNC: 139 MMOL/L (ref 134–144)
THYROPEROXIDASE AB SERPL-ACNC: <9 IU/ML (ref 0–34)
VIT B12 SERPL-MCNC: 561 PG/ML (ref 232–1245)
WBC # BLD AUTO: 4.6 X10E3/UL (ref 3.4–10.8)

## 2025-08-17 DIAGNOSIS — I10 ESSENTIAL HYPERTENSION: ICD-10-CM

## 2025-08-17 RX ORDER — VALSARTAN AND HYDROCHLOROTHIAZIDE 320; 12.5 MG/1; MG/1
1 TABLET, FILM COATED ORAL DAILY
Qty: 90 TABLET | Refills: 1 | Status: SHIPPED | OUTPATIENT
Start: 2025-08-17

## (undated) DEVICE — ADAPT CLN BIOGUARD AIR/H2O DISP

## (undated) DEVICE — ADAPT SWVL FIBROPTIC BRONCH

## (undated) DEVICE — JACKT LAB F/R KNIT CUFF/COLR XLG BLU

## (undated) DEVICE — TRAP,MUCUS SPECIMEN, 80CC: Brand: MEDLINE

## (undated) DEVICE — Device: Brand: BALLOON

## (undated) DEVICE — SENSR O2 OXIMAX FNGR A/ 18IN NONSTR

## (undated) DEVICE — GOWN ISOL W/THUMB UNIV BLU BX/15

## (undated) DEVICE — CANN O2 ETCO2 FITS ALL CONN CO2 SMPL A/ 7IN DISP LF

## (undated) DEVICE — FLEX ADVANTAGE 1500CC: Brand: FLEX ADVANTAGE

## (undated) DEVICE — BLCK/BITE BLOX W/DENTL/RIM W/STRAP 54F

## (undated) DEVICE — Device

## (undated) DEVICE — SINGLE USE BIOPSY VALVE MAJ-210: Brand: SINGLE USE BIOPSY VALVE (STERILE)

## (undated) DEVICE — GAS SAMPLING LINE,10,MM,0.047ID,CO-EX: Brand: MEDLINE

## (undated) DEVICE — TUBING, SUCTION, 1/4" X 10', STRAIGHT: Brand: MEDLINE

## (undated) DEVICE — MSK AIRWY LARYNG LMA PILOT SZ4

## (undated) DEVICE — SINGLE USE SUCTION VALVE MAJ-209: Brand: SINGLE USE SUCTION VALVE (STERILE)

## (undated) DEVICE — KT ORCA ORCAPOD DISP STRL

## (undated) DEVICE — VITAL SIGNS™ JACKSON-REES CIRCUITS: Brand: VITAL SIGNS™

## (undated) DEVICE — SINGLE USE ASPIRATION NEEDLE: Brand: SINGLE USE ASPIRATION NEEDLE

## (undated) DEVICE — ADAPT CLN SCPE ENDO PORPOISE BX/50 DISP